# Patient Record
Sex: FEMALE | Race: WHITE | NOT HISPANIC OR LATINO | Employment: OTHER | URBAN - METROPOLITAN AREA
[De-identification: names, ages, dates, MRNs, and addresses within clinical notes are randomized per-mention and may not be internally consistent; named-entity substitution may affect disease eponyms.]

---

## 2017-01-25 ENCOUNTER — ANESTHESIA (OUTPATIENT)
Dept: INTENSIVE CARE | Facility: HOSPITAL | Age: 77
DRG: 871 | End: 2017-01-25
Payer: MEDICARE

## 2017-01-25 ENCOUNTER — HOSPITAL ENCOUNTER (INPATIENT)
Facility: HOSPITAL | Age: 77
LOS: 5 days | Discharge: HOME HEALTH | DRG: 871 | End: 2017-01-30
Attending: INTERNAL MEDICINE | Admitting: INTERNAL MEDICINE
Payer: MEDICARE

## 2017-01-25 ENCOUNTER — ANESTHESIA EVENT (OUTPATIENT)
Dept: INTENSIVE CARE | Facility: HOSPITAL | Age: 77
DRG: 871 | End: 2017-01-25
Payer: MEDICARE

## 2017-01-25 DIAGNOSIS — F02.80 ALZHEIMER'S DEMENTIA WITHOUT BEHAVIORAL DISTURBANCE, UNSPECIFIED TIMING OF DEMENTIA ONSET: ICD-10-CM

## 2017-01-25 DIAGNOSIS — E87.5 HYPERKALEMIA: ICD-10-CM

## 2017-01-25 DIAGNOSIS — J96.01 ACUTE RESPIRATORY FAILURE WITH HYPOXIA: ICD-10-CM

## 2017-01-25 DIAGNOSIS — E83.42 HYPOMAGNESEMIA: ICD-10-CM

## 2017-01-25 DIAGNOSIS — I50.1 PULMONARY EDEMA WITH CONGESTIVE HEART FAILURE: ICD-10-CM

## 2017-01-25 DIAGNOSIS — J44.9 COPD (CHRONIC OBSTRUCTIVE PULMONARY DISEASE): ICD-10-CM

## 2017-01-25 DIAGNOSIS — N17.9 AKI (ACUTE KIDNEY INJURY): ICD-10-CM

## 2017-01-25 DIAGNOSIS — R53.1 WEAKNESS: ICD-10-CM

## 2017-01-25 DIAGNOSIS — J15.4 STREPTOCOCCAL PNEUMONIA: ICD-10-CM

## 2017-01-25 DIAGNOSIS — E78.2 MIXED HYPERLIPIDEMIA: ICD-10-CM

## 2017-01-25 DIAGNOSIS — J43.2 CENTRILOBULAR EMPHYSEMA: ICD-10-CM

## 2017-01-25 DIAGNOSIS — E66.01 MORBID OBESITY DUE TO EXCESS CALORIES: ICD-10-CM

## 2017-01-25 DIAGNOSIS — G30.9 ALZHEIMER'S DEMENTIA WITHOUT BEHAVIORAL DISTURBANCE, UNSPECIFIED TIMING OF DEMENTIA ONSET: ICD-10-CM

## 2017-01-25 DIAGNOSIS — R79.89 TROPONIN I ABOVE REFERENCE RANGE: ICD-10-CM

## 2017-01-25 DIAGNOSIS — D64.9 NORMOCYTIC ANEMIA: ICD-10-CM

## 2017-01-25 DIAGNOSIS — E11.8 TYPE 2 DIABETES MELLITUS WITH COMPLICATION, WITH LONG-TERM CURRENT USE OF INSULIN: ICD-10-CM

## 2017-01-25 DIAGNOSIS — R65.20 SEVERE SEPSIS WITH ACUTE ORGAN DYSFUNCTION: ICD-10-CM

## 2017-01-25 DIAGNOSIS — F03.90 DEMENTIA: ICD-10-CM

## 2017-01-25 DIAGNOSIS — N19 UREMIA: ICD-10-CM

## 2017-01-25 DIAGNOSIS — I10 ESSENTIAL HYPERTENSION: ICD-10-CM

## 2017-01-25 DIAGNOSIS — R57.0 CARDIOGENIC SHOCK: ICD-10-CM

## 2017-01-25 DIAGNOSIS — R53.81 PHYSICAL DECONDITIONING: ICD-10-CM

## 2017-01-25 DIAGNOSIS — A41.9 SEVERE SEPSIS WITH ACUTE ORGAN DYSFUNCTION: ICD-10-CM

## 2017-01-25 DIAGNOSIS — N18.4 CKD (CHRONIC KIDNEY DISEASE) STAGE 4, GFR 15-29 ML/MIN: ICD-10-CM

## 2017-01-25 DIAGNOSIS — I44.7 LBBB (LEFT BUNDLE BRANCH BLOCK): ICD-10-CM

## 2017-01-25 DIAGNOSIS — Z79.4 TYPE 2 DIABETES MELLITUS WITH COMPLICATION, WITH LONG-TERM CURRENT USE OF INSULIN: ICD-10-CM

## 2017-01-25 DIAGNOSIS — E87.1 HYPONATREMIA: ICD-10-CM

## 2017-01-25 DIAGNOSIS — R57.9 SHOCK: ICD-10-CM

## 2017-01-25 DIAGNOSIS — G93.40 ENCEPHALOPATHY ACUTE: ICD-10-CM

## 2017-01-25 DIAGNOSIS — G93.40 ENCEPHALOPATHY: ICD-10-CM

## 2017-01-25 DIAGNOSIS — E11.8 TYPE 2 DIABETES MELLITUS WITH COMPLICATION, WITHOUT LONG-TERM CURRENT USE OF INSULIN: ICD-10-CM

## 2017-01-25 DIAGNOSIS — J96.21 ACUTE ON CHRONIC RESPIRATORY FAILURE WITH HYPOXIA AND HYPERCAPNIA: Primary | ICD-10-CM

## 2017-01-25 DIAGNOSIS — J96.22 ACUTE ON CHRONIC RESPIRATORY FAILURE WITH HYPOXIA AND HYPERCAPNIA: Primary | ICD-10-CM

## 2017-01-25 PROBLEM — I95.0 IDIOPATHIC HYPOTENSION: Status: RESOLVED | Noted: 2017-01-25 | Resolved: 2017-01-25

## 2017-01-25 PROBLEM — I95.0 IDIOPATHIC HYPOTENSION: Status: ACTIVE | Noted: 2017-01-25

## 2017-01-25 PROBLEM — B35.3 TINEA PEDIS OF BOTH FEET: Status: ACTIVE | Noted: 2017-01-25

## 2017-01-25 LAB
ALBUMIN SERPL BCP-MCNC: 2 G/DL
ALBUMIN SERPL BCP-MCNC: 2.1 G/DL
ALLENS TEST: ABNORMAL
ALP SERPL-CCNC: 119 U/L
ALT SERPL W/O P-5'-P-CCNC: 13 U/L
AMPHET+METHAMPHET UR QL: NEGATIVE
ANION GAP SERPL CALC-SCNC: 15 MMOL/L
ANION GAP SERPL CALC-SCNC: 18 MMOL/L
ANION GAP SERPL CALC-SCNC: 18 MMOL/L
ANION GAP SERPL CALC-SCNC: 19 MMOL/L
ANISOCYTOSIS BLD QL SMEAR: SLIGHT
AST SERPL-CCNC: 11 U/L
B-OH-BUTYR BLD STRIP-SCNC: 0 MMOL/L
BARBITURATES UR QL SCN>200 NG/ML: NEGATIVE
BASOPHILS # BLD AUTO: ABNORMAL K/UL
BASOPHILS NFR BLD: 0 %
BENZODIAZ UR QL SCN>200 NG/ML: NEGATIVE
BILIRUB SERPL-MCNC: 0.4 MG/DL
BILIRUB UR QL STRIP: NEGATIVE
BNP SERPL-MCNC: 274 PG/ML
BUN SERPL-MCNC: 105 MG/DL
BUN SERPL-MCNC: 105 MG/DL
BUN SERPL-MCNC: 110 MG/DL
BUN SERPL-MCNC: 116 MG/DL
BZE UR QL SCN: NEGATIVE
CALCIUM SERPL-MCNC: 7.7 MG/DL
CALCIUM SERPL-MCNC: 8.3 MG/DL
CALCIUM SERPL-MCNC: 8.5 MG/DL
CALCIUM SERPL-MCNC: 8.5 MG/DL
CANNABINOIDS UR QL SCN: NEGATIVE
CHLORIDE SERPL-SCNC: 86 MMOL/L
CHLORIDE SERPL-SCNC: 88 MMOL/L
CHLORIDE SERPL-SCNC: 89 MMOL/L
CHLORIDE SERPL-SCNC: 91 MMOL/L
CHOLEST/HDLC SERPL: 5.7 {RATIO}
CLARITY UR: CLEAR
CO2 SERPL-SCNC: 22 MMOL/L
CO2 SERPL-SCNC: 23 MMOL/L
CO2 SERPL-SCNC: 23 MMOL/L
CO2 SERPL-SCNC: 24 MMOL/L
COLOR UR: YELLOW
CREAT SERPL-MCNC: 10.2 MG/DL
CREAT SERPL-MCNC: 11.1 MG/DL
CREAT SERPL-MCNC: 11.9 MG/DL
CREAT SERPL-MCNC: 12.4 MG/DL
CREAT UR-MCNC: 69 MG/DL
CREAT UR-MCNC: 69 MG/DL
DELSYS: ABNORMAL
DIFFERENTIAL METHOD: ABNORMAL
EOSINOPHIL # BLD AUTO: ABNORMAL K/UL
EOSINOPHIL NFR BLD: 0 %
ERYTHROCYTE [DISTWIDTH] IN BLOOD BY AUTOMATED COUNT: 14.8 %
ERYTHROCYTE [SEDIMENTATION RATE] IN BLOOD BY WESTERGREN METHOD: 16 MM/H
ERYTHROCYTE [SEDIMENTATION RATE] IN BLOOD BY WESTERGREN METHOD: 20 MM/H
ERYTHROCYTE [SEDIMENTATION RATE] IN BLOOD BY WESTERGREN METHOD: 28 MM/H
ERYTHROCYTE [SEDIMENTATION RATE] IN BLOOD BY WESTERGREN METHOD: 34 MM/H
ERYTHROCYTE [SEDIMENTATION RATE] IN BLOOD BY WESTERGREN METHOD: 34 MM/H
EST. GFR  (AFRICAN AMERICAN): 3 ML/MIN/1.73 M^2
EST. GFR  (AFRICAN AMERICAN): 4 ML/MIN/1.73 M^2
EST. GFR  (NON AFRICAN AMERICAN): 3 ML/MIN/1.73 M^2
ESTIMATED AVG GLUCOSE: 169 MG/DL
ESTIMATED PA SYSTOLIC PRESSURE: 3.46
ETHANOL UR-MCNC: <10 MG/DL
FERRITIN SERPL-MCNC: 434 NG/ML
FIO2: 50
FOLATE SERPL-MCNC: 11.7 NG/ML
GLUCOSE SERPL-MCNC: 194 MG/DL
GLUCOSE SERPL-MCNC: 207 MG/DL
GLUCOSE SERPL-MCNC: 307 MG/DL
GLUCOSE SERPL-MCNC: 356 MG/DL
GLUCOSE UR QL STRIP: NEGATIVE
HBA1C MFR BLD HPLC: 7.5 %
HCO3 UR-SCNC: 24.2 MMOL/L (ref 24–28)
HCO3 UR-SCNC: 24.2 MMOL/L (ref 24–28)
HCO3 UR-SCNC: 25.5 MMOL/L (ref 24–28)
HCO3 UR-SCNC: 25.7 MMOL/L (ref 24–28)
HCO3 UR-SCNC: 26.1 MMOL/L (ref 24–28)
HCO3 UR-SCNC: 27.2 MMOL/L (ref 24–28)
HCT VFR BLD AUTO: 33.5 %
HCT VFR BLD CALC: 34 %PCV (ref 36–54)
HDL/CHOLESTEROL RATIO: 17.7 %
HDLC SERPL-MCNC: 113 MG/DL
HDLC SERPL-MCNC: 20 MG/DL
HGB BLD-MCNC: 10.3 G/DL
HGB BLD-MCNC: 12 G/DL
HGB UR QL STRIP: ABNORMAL
HYPOCHROMIA BLD QL SMEAR: ABNORMAL
IRON SERPL-MCNC: 46 UG/DL
KETONES UR QL STRIP: NEGATIVE
LACTATE SERPL-SCNC: 0.6 MMOL/L
LDLC SERPL CALC-MCNC: 68.2 MG/DL
LEUKOCYTE ESTERASE UR QL STRIP: NEGATIVE
LYMPHOCYTES # BLD AUTO: ABNORMAL K/UL
LYMPHOCYTES NFR BLD: 7 %
MAGNESIUM SERPL-MCNC: 1.6 MG/DL
MAGNESIUM SERPL-MCNC: 1.9 MG/DL
MCH RBC QN AUTO: 29.6 PG
MCHC RBC AUTO-ENTMCNC: 30.7 %
MCV RBC AUTO: 96 FL
METHADONE UR QL SCN>300 NG/ML: NEGATIVE
MICROSCOPIC COMMENT: NORMAL
MODE: ABNORMAL
MONOCYTES # BLD AUTO: ABNORMAL K/UL
MONOCYTES NFR BLD: 1 %
NEUTROPHILS NFR BLD: 89 %
NEUTS BAND NFR BLD MANUAL: 3 %
NITRITE UR QL STRIP: NEGATIVE
NONHDLC SERPL-MCNC: 93 MG/DL
OPIATES UR QL SCN: NORMAL
PCO2 BLDA: 35.4 MMHG (ref 35–45)
PCO2 BLDA: 54.1 MMHG (ref 35–45)
PCO2 BLDA: 54.4 MMHG (ref 35–45)
PCO2 BLDA: 56.1 MMHG (ref 35–45)
PCO2 BLDA: 77 MMHG (ref 35–45)
PCO2 BLDA: 85.8 MMHG (ref 35–45)
PCP UR QL SCN>25 NG/ML: NEGATIVE
PEEP: 5
PH SMN: 7.11 [PH] (ref 7.35–7.45)
PH SMN: 7.14 [PH] (ref 7.35–7.45)
PH SMN: 7.26 [PH] (ref 7.35–7.45)
PH SMN: 7.27 [PH] (ref 7.35–7.45)
PH SMN: 7.28 [PH] (ref 7.35–7.45)
PH SMN: 7.44 [PH] (ref 7.35–7.45)
PH UR STRIP: 5 [PH] (ref 5–8)
PHOSPHATE SERPL-MCNC: 7.4 MG/DL
PLATELET # BLD AUTO: 433 K/UL
PLATELET BLD QL SMEAR: ABNORMAL
PMV BLD AUTO: 9.2 FL
PO2 BLDA: 109 MMHG (ref 80–100)
PO2 BLDA: 120 MMHG (ref 80–100)
PO2 BLDA: 120 MMHG (ref 80–100)
PO2 BLDA: 42 MMHG (ref 40–60)
PO2 BLDA: 75 MMHG (ref 80–100)
PO2 BLDA: 81 MMHG (ref 80–100)
POC BE: -1 MMOL/L
POC BE: -1 MMOL/L
POC BE: -2 MMOL/L
POC BE: -3 MMOL/L
POC BE: -3 MMOL/L
POC BE: 0 MMOL/L
POC IONIZED CALCIUM: 0.99 MMOL/L (ref 1.06–1.42)
POC SATURATED O2: 80 % (ref 95–100)
POC SATURATED O2: 89 % (ref 95–100)
POC SATURATED O2: 90 % (ref 95–100)
POC SATURATED O2: 97 % (ref 95–100)
POC SATURATED O2: 98 % (ref 95–100)
POC SATURATED O2: 98 % (ref 95–100)
POC TCO2: 25 MMOL/L (ref 24–29)
POC TCO2: 26 MMOL/L (ref 23–27)
POC TCO2: 27 MMOL/L (ref 23–27)
POC TCO2: 27 MMOL/L (ref 23–27)
POC TCO2: 28 MMOL/L (ref 23–27)
POC TCO2: 30 MMOL/L (ref 23–27)
POCT GLUCOSE: 119 MG/DL (ref 70–110)
POCT GLUCOSE: 146 MG/DL (ref 70–110)
POCT GLUCOSE: 150 MG/DL (ref 70–110)
POCT GLUCOSE: 156 MG/DL (ref 70–110)
POCT GLUCOSE: 167 MG/DL (ref 70–110)
POCT GLUCOSE: 176 MG/DL (ref 70–110)
POCT GLUCOSE: 188 MG/DL (ref 70–110)
POCT GLUCOSE: 193 MG/DL (ref 70–110)
POCT GLUCOSE: 218 MG/DL (ref 70–110)
POCT GLUCOSE: 240 MG/DL (ref 70–110)
POCT GLUCOSE: 240 MG/DL (ref 70–110)
POCT GLUCOSE: 250 MG/DL (ref 70–110)
POCT GLUCOSE: 273 MG/DL (ref 70–110)
POCT GLUCOSE: 316 MG/DL (ref 70–110)
POCT GLUCOSE: 326 MG/DL (ref 70–110)
POCT GLUCOSE: 330 MG/DL (ref 70–110)
POCT GLUCOSE: 346 MG/DL (ref 70–110)
POTASSIUM BLD-SCNC: 4.9 MMOL/L (ref 3.5–5.1)
POTASSIUM SERPL-SCNC: 4.9 MMOL/L
POTASSIUM SERPL-SCNC: 5 MMOL/L
POTASSIUM SERPL-SCNC: 6 MMOL/L
POTASSIUM SERPL-SCNC: 6.6 MMOL/L
PROCALCITONIN SERPL IA-MCNC: 0.44 NG/ML
PROT SERPL-MCNC: 6.2 G/DL
PROT UR QL STRIP: NEGATIVE
RBC # BLD AUTO: 3.48 M/UL
RBC #/AREA URNS HPF: 2 /HPF (ref 0–4)
RETICS/RBC NFR AUTO: 2.6 %
RETIRED EF AND QEF - SEE NOTES: 50 (ref 55–65)
SAMPLE: ABNORMAL
SATURATED IRON: 25 %
SITE: ABNORMAL
SODIUM BLD-SCNC: 128 MMOL/L (ref 136–145)
SODIUM SERPL-SCNC: 127 MMOL/L
SODIUM SERPL-SCNC: 129 MMOL/L
SODIUM SERPL-SCNC: 130 MMOL/L
SODIUM SERPL-SCNC: 130 MMOL/L
SODIUM UR-SCNC: 54 MMOL/L
SP GR UR STRIP: 1.02 (ref 1–1.03)
TOTAL IRON BINDING CAPACITY: 185 UG/DL
TOXICOLOGY INFORMATION: NORMAL
TRANSFERRIN SERPL-MCNC: 125 MG/DL
TRIGL SERPL-MCNC: 124 MG/DL
TROPONIN I SERPL DL<=0.01 NG/ML-MCNC: 0.09 NG/ML
TROPONIN I SERPL DL<=0.01 NG/ML-MCNC: 0.1 NG/ML
TROPONIN I SERPL DL<=0.01 NG/ML-MCNC: 0.1 NG/ML
TSH SERPL DL<=0.005 MIU/L-ACNC: 1.12 UIU/ML
URN SPEC COLLECT METH UR: ABNORMAL
UROBILINOGEN UR STRIP-ACNC: NEGATIVE EU/DL
VANCOMYCIN SERPL-MCNC: <1.1 UG/ML
VIT B12 SERPL-MCNC: 887 PG/ML
VT: 350
VT: 350
VT: 410
VT: 420
VT: 420
WBC # BLD AUTO: 15.91 K/UL
WBC #/AREA URNS HPF: 0 /HPF (ref 0–5)

## 2017-01-25 PROCEDURE — 94761 N-INVAS EAR/PLS OXIMETRY MLT: CPT

## 2017-01-25 PROCEDURE — 5A1945Z RESPIRATORY VENTILATION, 24-96 CONSECUTIVE HOURS: ICD-10-PCS | Performed by: INTERNAL MEDICINE

## 2017-01-25 PROCEDURE — 36620 INSERTION CATHETER ARTERY: CPT

## 2017-01-25 PROCEDURE — 80061 LIPID PANEL: CPT

## 2017-01-25 PROCEDURE — 82803 BLOOD GASES ANY COMBINATION: CPT

## 2017-01-25 PROCEDURE — 83735 ASSAY OF MAGNESIUM: CPT | Mod: 91

## 2017-01-25 PROCEDURE — 84300 ASSAY OF URINE SODIUM: CPT

## 2017-01-25 PROCEDURE — 85007 BL SMEAR W/DIFF WBC COUNT: CPT

## 2017-01-25 PROCEDURE — 27100080 HC AIRWAY ADAPTER-END TIDAL CO2

## 2017-01-25 PROCEDURE — 84145 PROCALCITONIN (PCT): CPT

## 2017-01-25 PROCEDURE — 27100108

## 2017-01-25 PROCEDURE — 25000003 PHARM REV CODE 250: Performed by: INTERNAL MEDICINE

## 2017-01-25 PROCEDURE — 86706 HEP B SURFACE ANTIBODY: CPT

## 2017-01-25 PROCEDURE — 80069 RENAL FUNCTION PANEL: CPT

## 2017-01-25 PROCEDURE — 25000003 PHARM REV CODE 250: Performed by: STUDENT IN AN ORGANIZED HEALTH CARE EDUCATION/TRAINING PROGRAM

## 2017-01-25 PROCEDURE — C9113 INJ PANTOPRAZOLE SODIUM, VIA: HCPCS | Performed by: STUDENT IN AN ORGANIZED HEALTH CARE EDUCATION/TRAINING PROGRAM

## 2017-01-25 PROCEDURE — 82728 ASSAY OF FERRITIN: CPT

## 2017-01-25 PROCEDURE — 20000000 HC ICU ROOM

## 2017-01-25 PROCEDURE — 82607 VITAMIN B-12: CPT

## 2017-01-25 PROCEDURE — 83540 ASSAY OF IRON: CPT

## 2017-01-25 PROCEDURE — 0BH17EZ INSERTION OF ENDOTRACHEAL AIRWAY INTO TRACHEA, VIA NATURAL OR ARTIFICIAL OPENING: ICD-10-PCS | Performed by: INTERNAL MEDICINE

## 2017-01-25 PROCEDURE — 31500 INSERT EMERGENCY AIRWAY: CPT

## 2017-01-25 PROCEDURE — 94640 AIRWAY INHALATION TREATMENT: CPT

## 2017-01-25 PROCEDURE — 80053 COMPREHEN METABOLIC PANEL: CPT

## 2017-01-25 PROCEDURE — 83880 ASSAY OF NATRIURETIC PEPTIDE: CPT

## 2017-01-25 PROCEDURE — 37799 UNLISTED PX VASCULAR SURGERY: CPT

## 2017-01-25 PROCEDURE — 87040 BLOOD CULTURE FOR BACTERIA: CPT

## 2017-01-25 PROCEDURE — 84443 ASSAY THYROID STIM HORMONE: CPT

## 2017-01-25 PROCEDURE — 90945 DIALYSIS ONE EVALUATION: CPT

## 2017-01-25 PROCEDURE — 83036 HEMOGLOBIN GLYCOSYLATED A1C: CPT

## 2017-01-25 PROCEDURE — 94002 VENT MGMT INPAT INIT DAY: CPT

## 2017-01-25 PROCEDURE — 87340 HEPATITIS B SURFACE AG IA: CPT

## 2017-01-25 PROCEDURE — 87205 SMEAR GRAM STAIN: CPT

## 2017-01-25 PROCEDURE — 27200966 HC CLOSED SUCTION SYSTEM

## 2017-01-25 PROCEDURE — 85027 COMPLETE CBC AUTOMATED: CPT

## 2017-01-25 PROCEDURE — 80307 DRUG TEST PRSMV CHEM ANLYZR: CPT

## 2017-01-25 PROCEDURE — 86705 HEP B CORE ANTIBODY IGM: CPT

## 2017-01-25 PROCEDURE — 36415 COLL VENOUS BLD VENIPUNCTURE: CPT

## 2017-01-25 PROCEDURE — 80048 BASIC METABOLIC PNL TOTAL CA: CPT

## 2017-01-25 PROCEDURE — 87147 CULTURE TYPE IMMUNOLOGIC: CPT

## 2017-01-25 PROCEDURE — 63600175 PHARM REV CODE 636 W HCPCS: Performed by: HOSPITALIST

## 2017-01-25 PROCEDURE — 36600 WITHDRAWAL OF ARTERIAL BLOOD: CPT

## 2017-01-25 PROCEDURE — 84484 ASSAY OF TROPONIN QUANT: CPT | Mod: 91

## 2017-01-25 PROCEDURE — 82010 KETONE BODYS QUAN: CPT

## 2017-01-25 PROCEDURE — 80202 ASSAY OF VANCOMYCIN: CPT

## 2017-01-25 PROCEDURE — 25000242 PHARM REV CODE 250 ALT 637 W/ HCPCS: Performed by: INTERNAL MEDICINE

## 2017-01-25 PROCEDURE — 86706 HEP B SURFACE ANTIBODY: CPT | Mod: 91

## 2017-01-25 PROCEDURE — 25000242 PHARM REV CODE 250 ALT 637 W/ HCPCS: Performed by: STUDENT IN AN ORGANIZED HEALTH CARE EDUCATION/TRAINING PROGRAM

## 2017-01-25 PROCEDURE — 31500 INSERT EMERGENCY AIRWAY: CPT | Performed by: ANESTHESIOLOGY

## 2017-01-25 PROCEDURE — 27000221 HC OXYGEN, UP TO 24 HOURS

## 2017-01-25 PROCEDURE — 36556 INSERT NON-TUNNEL CV CATH: CPT

## 2017-01-25 PROCEDURE — 93005 ELECTROCARDIOGRAM TRACING: CPT

## 2017-01-25 PROCEDURE — 80048 BASIC METABOLIC PNL TOTAL CA: CPT | Mod: 91

## 2017-01-25 PROCEDURE — 85045 AUTOMATED RETICULOCYTE COUNT: CPT

## 2017-01-25 PROCEDURE — 51702 INSERT TEMP BLADDER CATH: CPT

## 2017-01-25 PROCEDURE — 87086 URINE CULTURE/COLONY COUNT: CPT

## 2017-01-25 PROCEDURE — 63600175 PHARM REV CODE 636 W HCPCS

## 2017-01-25 PROCEDURE — 25000003 PHARM REV CODE 250

## 2017-01-25 PROCEDURE — 63600175 PHARM REV CODE 636 W HCPCS: Performed by: STUDENT IN AN ORGANIZED HEALTH CARE EDUCATION/TRAINING PROGRAM

## 2017-01-25 PROCEDURE — 84484 ASSAY OF TROPONIN QUANT: CPT

## 2017-01-25 PROCEDURE — 83735 ASSAY OF MAGNESIUM: CPT

## 2017-01-25 PROCEDURE — 25000003 PHARM REV CODE 250: Performed by: SURGERY

## 2017-01-25 PROCEDURE — 82746 ASSAY OF FOLIC ACID SERUM: CPT

## 2017-01-25 PROCEDURE — 83605 ASSAY OF LACTIC ACID: CPT

## 2017-01-25 PROCEDURE — 87070 CULTURE OTHR SPECIMN AEROBIC: CPT

## 2017-01-25 PROCEDURE — 93306 TTE W/DOPPLER COMPLETE: CPT

## 2017-01-25 PROCEDURE — 63600175 PHARM REV CODE 636 W HCPCS: Performed by: INTERNAL MEDICINE

## 2017-01-25 PROCEDURE — 81000 URINALYSIS NONAUTO W/SCOPE: CPT

## 2017-01-25 PROCEDURE — 02H633Z INSERTION OF INFUSION DEVICE INTO RIGHT ATRIUM, PERCUTANEOUS APPROACH: ICD-10-PCS | Performed by: INTERNAL MEDICINE

## 2017-01-25 RX ORDER — PENTOXIFYLLINE 400 MG/1
400 TABLET, EXTENDED RELEASE ORAL 2 TIMES DAILY WITH MEALS
Status: DISCONTINUED | OUTPATIENT
Start: 2017-01-25 | End: 2017-01-30 | Stop reason: HOSPADM

## 2017-01-25 RX ORDER — MAGNESIUM SULFATE HEPTAHYDRATE 40 MG/ML
2 INJECTION, SOLUTION INTRAVENOUS ONCE
Status: COMPLETED | OUTPATIENT
Start: 2017-01-25 | End: 2017-01-25

## 2017-01-25 RX ORDER — MIDAZOLAM HYDROCHLORIDE 1 MG/ML
2 INJECTION INTRAMUSCULAR; INTRAVENOUS ONCE
Status: COMPLETED | OUTPATIENT
Start: 2017-01-25 | End: 2017-01-25

## 2017-01-25 RX ORDER — PROPOFOL 10 MG/ML
5 INJECTION, EMULSION INTRAVENOUS CONTINUOUS
Status: DISCONTINUED | OUTPATIENT
Start: 2017-01-25 | End: 2017-01-26

## 2017-01-25 RX ORDER — SODIUM POLYSTYRENE SULFONATE 15 G/60ML
15 SUSPENSION ORAL; RECTAL ONCE
Status: COMPLETED | OUTPATIENT
Start: 2017-01-25 | End: 2017-01-25

## 2017-01-25 RX ORDER — ALBUTEROL SULFATE 2.5 MG/.5ML
15 SOLUTION RESPIRATORY (INHALATION) ONCE
Status: COMPLETED | OUTPATIENT
Start: 2017-01-25 | End: 2017-01-25

## 2017-01-25 RX ORDER — IPRATROPIUM BROMIDE AND ALBUTEROL SULFATE 2.5; .5 MG/3ML; MG/3ML
3 SOLUTION RESPIRATORY (INHALATION) EVERY 4 HOURS
Status: DISCONTINUED | OUTPATIENT
Start: 2017-01-25 | End: 2017-01-30 | Stop reason: HOSPADM

## 2017-01-25 RX ORDER — FLUTICASONE FUROATE AND VILANTEROL 100; 25 UG/1; UG/1
1 POWDER RESPIRATORY (INHALATION) DAILY
Status: DISCONTINUED | OUTPATIENT
Start: 2017-01-25 | End: 2017-01-30 | Stop reason: HOSPADM

## 2017-01-25 RX ORDER — CHLORHEXIDINE GLUCONATE ORAL RINSE 1.2 MG/ML
15 SOLUTION DENTAL 2 TIMES DAILY
Status: DISCONTINUED | OUTPATIENT
Start: 2017-01-25 | End: 2017-01-30 | Stop reason: HOSPADM

## 2017-01-25 RX ORDER — ONDANSETRON 2 MG/ML
4 INJECTION INTRAMUSCULAR; INTRAVENOUS ONCE
Status: COMPLETED | OUTPATIENT
Start: 2017-01-25 | End: 2017-01-25

## 2017-01-25 RX ORDER — ONDANSETRON 2 MG/ML
INJECTION INTRAMUSCULAR; INTRAVENOUS
Status: COMPLETED
Start: 2017-01-25 | End: 2017-01-25

## 2017-01-25 RX ORDER — PROPOFOL 10 MG/ML
INJECTION, EMULSION INTRAVENOUS
Status: COMPLETED
Start: 2017-01-25 | End: 2017-01-25

## 2017-01-25 RX ORDER — DEXMEDETOMIDINE HYDROCHLORIDE 4 UG/ML
0.2 INJECTION, SOLUTION INTRAVENOUS CONTINUOUS
Status: DISCONTINUED | OUTPATIENT
Start: 2017-01-25 | End: 2017-01-26

## 2017-01-25 RX ORDER — DONEPEZIL HYDROCHLORIDE 5 MG/1
5 TABLET, FILM COATED ORAL NIGHTLY
Status: DISCONTINUED | OUTPATIENT
Start: 2017-01-25 | End: 2017-01-30 | Stop reason: HOSPADM

## 2017-01-25 RX ORDER — HEPARIN SODIUM 5000 [USP'U]/ML
5000 INJECTION, SOLUTION INTRAVENOUS; SUBCUTANEOUS EVERY 12 HOURS
Status: DISCONTINUED | OUTPATIENT
Start: 2017-01-25 | End: 2017-01-30 | Stop reason: HOSPADM

## 2017-01-25 RX ORDER — ATORVASTATIN CALCIUM 40 MG/1
80 TABLET, FILM COATED ORAL DAILY
Status: DISCONTINUED | OUTPATIENT
Start: 2017-01-25 | End: 2017-01-30 | Stop reason: HOSPADM

## 2017-01-25 RX ORDER — MAGNESIUM SULFATE HEPTAHYDRATE 40 MG/ML
2 INJECTION, SOLUTION INTRAVENOUS
Status: ACTIVE | OUTPATIENT
Start: 2017-01-25 | End: 2017-01-26

## 2017-01-25 RX ORDER — PANTOPRAZOLE SODIUM 40 MG/1
40 TABLET, DELAYED RELEASE ORAL DAILY
Status: DISCONTINUED | OUTPATIENT
Start: 2017-01-25 | End: 2017-01-25

## 2017-01-25 RX ORDER — SIMVASTATIN 40 MG/1
80 TABLET, FILM COATED ORAL NIGHTLY
Status: DISCONTINUED | OUTPATIENT
Start: 2017-01-25 | End: 2017-01-25

## 2017-01-25 RX ORDER — ONDANSETRON 2 MG/ML
4 INJECTION INTRAMUSCULAR; INTRAVENOUS ONCE
Status: DISCONTINUED | OUTPATIENT
Start: 2017-01-25 | End: 2017-01-25

## 2017-01-25 RX ORDER — METHYLPREDNISOLONE SOD SUCC 125 MG
125 VIAL (EA) INJECTION EVERY 8 HOURS
Status: DISCONTINUED | OUTPATIENT
Start: 2017-01-25 | End: 2017-01-25

## 2017-01-25 RX ORDER — CIPROFLOXACIN 2 MG/ML
400 INJECTION, SOLUTION INTRAVENOUS
Status: DISCONTINUED | OUTPATIENT
Start: 2017-01-25 | End: 2017-01-27

## 2017-01-25 RX ORDER — HEPARIN SODIUM 5000 [USP'U]/ML
5000 INJECTION, SOLUTION INTRAVENOUS; SUBCUTANEOUS EVERY 8 HOURS
Status: DISCONTINUED | OUTPATIENT
Start: 2017-01-25 | End: 2017-01-25

## 2017-01-25 RX ORDER — PANTOPRAZOLE SODIUM 40 MG/10ML
40 INJECTION, POWDER, LYOPHILIZED, FOR SOLUTION INTRAVENOUS DAILY
Status: DISCONTINUED | OUTPATIENT
Start: 2017-01-25 | End: 2017-01-27

## 2017-01-25 RX ORDER — HEPARIN SODIUM 1000 [USP'U]/ML
3000 INJECTION, SOLUTION INTRAVENOUS; SUBCUTANEOUS
Status: DISCONTINUED | OUTPATIENT
Start: 2017-01-25 | End: 2017-01-30 | Stop reason: HOSPADM

## 2017-01-25 RX ADMIN — VANCOMYCIN HYDROCHLORIDE 1500 MG: 1 INJECTION, POWDER, LYOPHILIZED, FOR SOLUTION INTRAVENOUS at 06:01

## 2017-01-25 RX ADMIN — HEPARIN SODIUM 3000 UNITS: 1000 INJECTION, SOLUTION INTRAVENOUS; SUBCUTANEOUS at 07:01

## 2017-01-25 RX ADMIN — PROPOFOL 5 MCG/KG/MIN: 10 INJECTION, EMULSION INTRAVENOUS at 11:01

## 2017-01-25 RX ADMIN — IPRATROPIUM BROMIDE AND ALBUTEROL SULFATE 3 ML: .5; 3 SOLUTION RESPIRATORY (INHALATION) at 05:01

## 2017-01-25 RX ADMIN — INSULIN HUMAN 10 UNITS: 100 INJECTION, SOLUTION PARENTERAL at 02:01

## 2017-01-25 RX ADMIN — PROPOFOL 25 MCG/KG/MIN: 10 INJECTION, EMULSION INTRAVENOUS at 03:01

## 2017-01-25 RX ADMIN — DEXMEDETOMIDINE HYDROCHLORIDE 0.9 MCG/KG/HR: 4 INJECTION, SOLUTION INTRAVENOUS at 06:01

## 2017-01-25 RX ADMIN — DEXMEDETOMIDINE HYDROCHLORIDE 1.5 MCG/KG/HR: 4 INJECTION, SOLUTION INTRAVENOUS at 10:01

## 2017-01-25 RX ADMIN — INSULIN HUMAN 10 UNITS: 100 INJECTION, SOLUTION PARENTERAL at 05:01

## 2017-01-25 RX ADMIN — ALBUTEROL SULFATE 15 MG: 2.5 SOLUTION RESPIRATORY (INHALATION) at 08:01

## 2017-01-25 RX ADMIN — SODIUM POLYSTYRENE SULFONATE 15 G: 15 SUSPENSION ORAL; RECTAL at 05:01

## 2017-01-25 RX ADMIN — PROPOFOL 35 MCG/KG/MIN: 10 INJECTION, EMULSION INTRAVENOUS at 11:01

## 2017-01-25 RX ADMIN — PANTOPRAZOLE SODIUM 40 MG: 40 INJECTION, POWDER, FOR SOLUTION INTRAVENOUS at 08:01

## 2017-01-25 RX ADMIN — DONEPEZIL HYDROCHLORIDE 5 MG: 5 TABLET ORAL at 10:01

## 2017-01-25 RX ADMIN — IPRATROPIUM BROMIDE AND ALBUTEROL SULFATE 3 ML: .5; 3 SOLUTION RESPIRATORY (INHALATION) at 11:01

## 2017-01-25 RX ADMIN — METHYLPREDNISOLONE SODIUM SUCCINATE 40 MG: 40 INJECTION, POWDER, FOR SOLUTION INTRAMUSCULAR; INTRAVENOUS at 10:01

## 2017-01-25 RX ADMIN — HEPARIN SODIUM 5000 UNITS: 5000 INJECTION, SOLUTION INTRAVENOUS; SUBCUTANEOUS at 05:01

## 2017-01-25 RX ADMIN — SODIUM CHLORIDE 2 UNITS/HR: 9 INJECTION, SOLUTION INTRAVENOUS at 06:01

## 2017-01-25 RX ADMIN — CIPROFLOXACIN 400 MG: 2 INJECTION, SOLUTION INTRAVENOUS at 05:01

## 2017-01-25 RX ADMIN — SODIUM CHLORIDE 4.6 UNITS/HR: 9 INJECTION, SOLUTION INTRAVENOUS at 12:01

## 2017-01-25 RX ADMIN — NOREPINEPHRINE BITARTRATE 0.02 MCG/KG/MIN: 1 INJECTION, SOLUTION, CONCENTRATE INTRAVENOUS at 05:01

## 2017-01-25 RX ADMIN — DEXTROSE MONOHYDRATE 25 G: 25 INJECTION, SOLUTION INTRAVENOUS at 06:01

## 2017-01-25 RX ADMIN — DEXMEDETOMIDINE HYDROCHLORIDE 0.2 MCG/KG/HR: 4 INJECTION, SOLUTION INTRAVENOUS at 05:01

## 2017-01-25 RX ADMIN — DEXTROSE MONOHYDRATE 25 G: 500 INJECTION PARENTERAL at 03:01

## 2017-01-25 RX ADMIN — CHLORHEXIDINE GLUCONATE 15 ML: 1.2 RINSE ORAL at 10:01

## 2017-01-25 RX ADMIN — PIPERACILLIN SODIUM AND TAZOBACTAM SODIUM 4.5 G: 4; .5 INJECTION, POWDER, FOR SOLUTION INTRAVENOUS at 05:01

## 2017-01-25 RX ADMIN — Medication 1000 MG: at 05:01

## 2017-01-25 RX ADMIN — PROPOFOL 25 MCG/KG/MIN: 10 INJECTION, EMULSION INTRAVENOUS at 07:01

## 2017-01-25 RX ADMIN — IPRATROPIUM BROMIDE AND ALBUTEROL SULFATE 3 ML: .5; 3 SOLUTION RESPIRATORY (INHALATION) at 04:01

## 2017-01-25 RX ADMIN — PROPOFOL 5 MCG/KG/MIN: 10 INJECTION, EMULSION INTRAVENOUS at 10:01

## 2017-01-25 RX ADMIN — DEXMEDETOMIDINE HYDROCHLORIDE 1.4 MCG/KG/HR: 4 INJECTION, SOLUTION INTRAVENOUS at 08:01

## 2017-01-25 RX ADMIN — IPRATROPIUM BROMIDE AND ALBUTEROL SULFATE 3 ML: .5; 3 SOLUTION RESPIRATORY (INHALATION) at 07:01

## 2017-01-25 RX ADMIN — HEPARIN SODIUM 5000 UNITS: 5000 INJECTION, SOLUTION INTRAVENOUS; SUBCUTANEOUS at 10:01

## 2017-01-25 RX ADMIN — MAGNESIUM SULFATE IN WATER 2 G: 40 INJECTION, SOLUTION INTRAVENOUS at 01:01

## 2017-01-25 RX ADMIN — METHYLPREDNISOLONE SODIUM SUCCINATE 125 MG: 125 INJECTION, POWDER, FOR SOLUTION INTRAMUSCULAR; INTRAVENOUS at 05:01

## 2017-01-25 RX ADMIN — DEXTROSE MONOHYDRATE 25 G: 500 INJECTION PARENTERAL at 02:01

## 2017-01-25 RX ADMIN — SODIUM CHLORIDE 1000 ML: 0.9 INJECTION, SOLUTION INTRAVENOUS at 01:01

## 2017-01-25 RX ADMIN — ATORVASTATIN CALCIUM 80 MG: 40 TABLET, FILM COATED ORAL at 10:01

## 2017-01-25 RX ADMIN — MIDAZOLAM 2 MG: 1 INJECTION INTRAMUSCULAR; INTRAVENOUS at 03:01

## 2017-01-25 RX ADMIN — IPRATROPIUM BROMIDE AND ALBUTEROL SULFATE 3 ML: .5; 3 SOLUTION RESPIRATORY (INHALATION) at 08:01

## 2017-01-25 NOTE — PLAN OF CARE
Discuss with Dialysis team that dialysis is emergent with uremia, concerning ABG, and K.  Dialysis team notes that it takes 30 min to prime the line.     Jan Duque MD  [facility]

## 2017-01-25 NOTE — PLAN OF CARE
Problem: Renal Replacement, Continuous (Adult)  Goal: Signs and Symptoms of Listed Potential Problems Will be Absent, Minimized or Managed (Renal Replacement, Continuous)  Signs and symptoms of listed potential problems will be absent, minimized or managed by discharge/transition of care (reference Renal Replacement, Continuous (Adult) CPG).  Outcome: Ongoing (interventions implemented as appropriate)    01/25/17 1630   Renal Replacement, Continuous   Problems Assessed (Continuous Renal Replacement Therapy) all   Problems Present (Continuous Renal Replacement Therapy) acid-base imbalance;electrolyte imbalance;fluid imbalance;hypotension;infection   NxStage CVVH in progress as ordered.

## 2017-01-25 NOTE — CONSULTS
Consult   [facility]       SUBJECTIVE:     Chief Complaint/Reason for Admission: LBBB , hypotension     History of Present Illness:  Ms. Adela Jessica is a 76 y.o. with CKD , DMII , HLD , HTN , depression , dementia , gerd ( ALL PER CHART 2/2 patients condition ) here with cough, increased work of breathing, then and AMS on day of admission which prompted evaluation . Per chart there hd been many calls to EMS for falls and AMS           PTA Medications   Medication Sig    albuterol (PROVENTIL/VENTOLIN) 90 mcg/actuation inhaler Inhale 2 puffs into the lungs every 6 (six) hours as needed for Wheezing.    aspirin 325 MG tablet Take 325 mg by mouth once daily.    donepezil (ARICEPT) 5 MG tablet Take 5 mg by mouth every evening.    fish oil-omega-3 fatty acids 300-1,000 mg capsule Take 2 g by mouth once daily.    fluticasone-salmeterol 100-50 mcg/dose (ADVAIR) 100-50 mcg/dose diskus inhaler Inhale 1 puff into the lungs 2 (two) times daily.    lisinopril (PRINIVIL,ZESTRIL) 5 MG tablet Take 5 mg by mouth once daily.    metformin (GLUCOPHAGE) 500 MG tablet Take 1 tablet (500 mg total) by mouth 2 (two) times daily with meals.    multivitamin (THERAGRAN) per tablet Take 1 tablet by mouth once daily.    omega-3 acid ethyl esters (LOVAZA) 1 gram capsule Take 2 g by mouth 2 (two) times daily.    omeprazole (PRILOSEC) 40 MG capsule Take 40 mg by mouth once daily.    pentoxifylline (TRENTAL) 400 mg TbSR Take 400 mg by mouth 2 (two) times daily with meals.    pregabalin (LYRICA) 50 MG capsule Take 50 mg by mouth 3 (three) times daily.    simvastatin (ZOCOR) 20 MG tablet Take 80 mg by mouth every evening.     triazolam (HALCION) 0.25 MG Tab Take 0.125 mg by mouth nightly as needed.    [DISCONTINUED] albuterol sulfate 2.5 mg/0.5 mL Nebu Take 2.5 mg by nebulization every 4 (four) hours as needed.    [DISCONTINUED] duloxetine (CYMBALTA) 60 MG capsule Take 60 mg by mouth once daily.    [DISCONTINUED] ferrous sulfate  325 mg (65 mg iron) Tab tablet Take 325 mg by mouth daily with breakfast.    [DISCONTINUED] glipiZIDE (GLUCOTROL) 5 MG tablet Take 5 mg by mouth daily with breakfast.    [DISCONTINUED] insulin detemir (LEVEMIR) 100 unit/mL injection Inject 18 Units into the skin every evening.        Review of patient's allergies indicates:  No Known Allergies    Past Medical History   Diagnosis Date    Arthritis     CKD (chronic kidney disease)     Dementia     Depression     DM2 (diabetes mellitus, type 2)     GERD (gastroesophageal reflux disease)     HLD (hyperlipidemia)     HTN (hypertension)     HIWOT on CPAP        Past Surgical History   Procedure Laterality Date    Hysterectomy         No family history on file.     Social History     Social History    Marital status: Single     Spouse name: N/A    Number of children: N/A    Years of education: N/A     Occupational History          retired     Social History Main Topics    Smoking status: Never Smoker    Smokeless tobacco: Not on file    Alcohol use No    Drug use: No    Sexual activity: Not on file     Other Topics Concern    Not on file     Social History Narrative       Review of Systems:  Unable to obtain       OBJECTIVE:     Temp:  [96 °F (35.6 °C)-97.2 °F (36.2 °C)] 96 °F (35.6 °C)  Pulse:  [61-86] 73  Resp:  [] 35  SpO2:  [81 %-100 %] 96 %  BP: ()/(33-76) 126/60  Body mass index is 60.92 kg/(m^2).     Physical Exam:  General appearance: intubated, sedated   HEENT:  Normocephalic, atruamatic, conjunctivae normal, sclarea anictric, PERRL, Mucus membranes moist,  Trachea midline , ET tube in place, R neck HD catheter   Lungs: expiratory wheezes, mechanical sounds    Heart: distant , RRR   Abdomen: Soft, non-tender, non-distended; bowel sounds normal; no masses, no organomegaly  Extremities: anasarca  Pulses: 2+ and symmetric        Laboratory: Reviewed and noted  where applicable- Please see chart for full lab data.  On admit abg  7.//   WBC 15, Hbg 10.3   K 6.6 Na 129  Creat 12.4 ( 1.46 prior)       Diagnostic Tests:  EK17  Sinus, intraventricular condution delay , Left axis LAFB  Poor R wave progresion     CXR: Pulmonary edema     CT    ASSESSMENT/PLAN:     Assessment Ms. Adela Jessica is a 76 y.o. with CKD , DMII , HLD , HTN , depression , dementia , gerd ( ALL PER CHART 2/2 patients condition ) here with cough, increased work of breathing, then and AMS on day of admission which prompted evaluation .    Plan       1. Hypotension , LBBB   No evidence of LBBB on EKG . Further this was removed from 2013 STEMI guidelines.     Reviewed echo , preserved systolic fucntion, echo inconclusive and limited for diastolic dysfunction, there was mitral E/A reversal indicating diastolic impairment but cannot comment on diastolic failure or left sided filling pressures with technically limited study.     Overall , seems like acute renal failure is precipitant of uremia ( AMS ) , and subsequent shortness of breath , cough from volume overload . ? Underlying infection   Lactic acid suprisingly normal after reported hypotension episode. Would wean pressors as tolerated, recheck lactic acid   Continue supportive care         Benjamin Calderon D.O  [facility] Fellow

## 2017-01-25 NOTE — NURSING TRANSFER
Nursing Transfer Note      1/25/2017     Transfer from [place] to [place] [id number]    Transfer via stretcher    Transfer with  O2, cardiac monitoring    Transported by EMS    Medicines sent: family brought    Chart send with patient: Yes    Notified: MD admit team    Patient reassessed at: 1/25/17 @ 12:24      Pt arrived with family members at bedside, pt eyes open, pt not verbally responding to questions. Pt extremities and head/neck twitching.     Pt V/S assessed:  T 97.7  RR 17, wheezes in all lungs  O2 sat 91% on 2L    After a few minutes, Pt closed her eyes and became unresponsive to strong sternal rub.     HR: 84  RR: 18   Automatic B/P Rt forearm 68/46, manual 62/44, Systolic reassessed by doppler to be 70.   MD admit team at bedside, ordered 1L bolus.         Telemetry not reading after multiple attempts.          Upon arrival to floor: call bell in reach and bed in lowest position    Nurse will continue to monitor closely.

## 2017-01-25 NOTE — CONSULTS
[facility] Pulm Consult - Resident Note    Primary Attending Physician: MD Brisa  Primary Team: [facility] MEdicine  Consultant Attending: MD Tara  Consultant Resident: MD Neto    Reason for Consult:     Hypercapnic Respiratory Failure    Subjective:      History of Present Illness:  Adela Jessica is a 76 y.o.  female who  has a past medical history of Arthritis; CKD (chronic kidney disease); Dementia; Depression; DM2 (diabetes mellitus, type 2); GERD (gastroesophageal reflux disease); HLD (hyperlipidemia); HTN (hypertension); and HIWOT on CPAP.. The patient presented to the [facility] on 1/25/2017 with a primary complaint of No chief complaint on file.    History unable to be obtained from patient. History from primary team. The patient was in her usual state until one month ago when she developed a nonproductive cough. One week ago she became more somnolent and sleeping than normal. Before admission she complained of SOB and doing funny things such as touching face.    Past Medical History:  Past Medical History   Diagnosis Date    Arthritis     CKD (chronic kidney disease)     Dementia     Depression     DM2 (diabetes mellitus, type 2)     GERD (gastroesophageal reflux disease)     HLD (hyperlipidemia)     HTN (hypertension)     HIWOT on CPAP        Past Surgical History:  Past Surgical History   Procedure Laterality Date    Hysterectomy         Allergies:  Review of patient's allergies indicates:  No Known Allergies    Medications:   In-Hospital Scheduled Medications:   albuterol-ipratropium 2.5mg-0.5mg/3mL  3 mL Nebulization Q4H    ciprofloxacin  400 mg Intravenous Q24H    donepezil  5 mg Oral QHS    fluticasone-vilanterol  1 puff Inhalation Daily    heparin (porcine)  5,000 Units Subcutaneous Q8H    methylPREDNISolone sodium succinate  125 mg Intravenous Q8H    pantoprazole  40 mg Intravenous Daily    pentoxifylline  400 mg Oral BID WM     piperacillin-tazobactam 4.5 g in dextrose 5 % 100 mL IVPB (ready to mix system)  4.5 g Intravenous Q12H    piperacillin-tazobactam 4.5 g in dextrose 5 % 100 mL IVPB (ready to mix system)  4.5 g Intravenous Once    simvastatin  80 mg Oral QHS    vancomycin (VANCOCIN) IVPB  1,500 mg Intravenous Once      In-Hospital PRN Medications:  dextrose 50%, dextrose 50%, dextrose 50%, heparin (porcine)   In-Hospital IV Infusion Medications:   dexmedetomidine (PRECEDEX) infusion 0.9 mcg/kg/hr (01/25/17 0642)    insulin (HUMAN R) infusion (adults) 2 Units/hr (01/25/17 0629)    norepinephrine bitartrate-D5W 0.02 mcg/kg/min (01/25/17 0507)      Home Medications:  Prior to Admission medications    Medication Sig Start Date End Date Taking? Authorizing Provider   albuterol (PROVENTIL/VENTOLIN) 90 mcg/actuation inhaler Inhale 2 puffs into the lungs every 6 (six) hours as needed for Wheezing. 11/13/13 3/6/16  Quang Hernández MD   aspirin 325 MG tablet Take 325 mg by mouth once daily.    Historical Provider, MD   donepezil (ARICEPT) 5 MG tablet Take 5 mg by mouth every evening.    Historical Provider, MD   fish oil-omega-3 fatty acids 300-1,000 mg capsule Take 2 g by mouth once daily.    Historical Provider, MD   fluticasone-salmeterol 100-50 mcg/dose (ADVAIR) 100-50 mcg/dose diskus inhaler Inhale 1 puff into the lungs 2 (two) times daily.    Historical Provider, MD   lisinopril (PRINIVIL,ZESTRIL) 5 MG tablet Take 5 mg by mouth once daily.    Historical Provider, MD   metformin (GLUCOPHAGE) 500 MG tablet Take 1 tablet (500 mg total) by mouth 2 (two) times daily with meals. 11/13/13   Quang Hernández MD   multivitamin (THERAGRAN) per tablet Take 1 tablet by mouth once daily.    Historical Provider, MD   omega-3 acid ethyl esters (LOVAZA) 1 gram capsule Take 2 g by mouth 2 (two) times daily.    Historical Provider, MD   omeprazole (PRILOSEC) 40 MG capsule Take 40 mg by mouth once daily.    Historical Provider, MD   pentoxifylline  "(TRENTAL) 400 mg TbSR Take 400 mg by mouth 2 (two) times daily with meals.    Historical Provider, MD   pregabalin (LYRICA) 50 MG capsule Take 50 mg by mouth 3 (three) times daily.    Historical Provider, MD   simvastatin (ZOCOR) 20 MG tablet Take 80 mg by mouth every evening.     Historical Provider, MD   triazolam (HALCION) 0.25 MG Tab Take 0.125 mg by mouth nightly as needed.    Historical Provider, MD   albuterol sulfate 2.5 mg/0.5 mL Nebu Take 2.5 mg by nebulization every 4 (four) hours as needed. 3/6/16 1/25/17  Josh Gunn MD   duloxetine (CYMBALTA) 60 MG capsule Take 60 mg by mouth once daily.  17  Historical Provider, MD   ferrous sulfate 325 mg (65 mg iron) Tab tablet Take 325 mg by mouth daily with breakfast.  17  Historical Provider, MD   glipiZIDE (GLUCOTROL) 5 MG tablet Take 5 mg by mouth daily with breakfast.  17  Historical Provider, MD   insulin detemir (LEVEMIR) 100 unit/mL injection Inject 18 Units into the skin every evening.  17  Historical Provider, MD       Family History:  No family history on file.    Social History:  Social History   Substance Use Topics    Smoking status: Never Smoker    Smokeless tobacco: Not on file    Alcohol use No       Review of Systems:  Unable to obtain a ROS. Due to intubation         Objective:   Last 24 Hour Vital Signs:  BP  Min: 90/51  Max: 90/51  Pulse  Av.5  Min: 67  Max: 85  Resp  Av.8  Min: 25  Max: 58  SpO2  Av.5 %  Min: 95 %  Max: 100 %  Height  Av' 3" (160 cm)  Min: 5' 3" (160 cm)  Max: 5' 3" (160 cm)  Weight  Av kg (343 lb 14.7 oz)  Min: 156 kg (343 lb 14.7 oz)  Max: 156 kg (343 lb 14.7 oz)     Body mass index is 60.92 kg/(m^2).    Physical Examination:    Visit Vitals    /60    Pulse 65    Temp 96 °F (35.6 °C) (Axillary)    Resp (!) 35    Ht 5' 3" (1.6 m)    Wt (!) 156 kg (343 lb 14.7 oz)    SpO2 98%    Breastfeeding No    BMI 60.92 kg/m2       General Appearance:    Intubated, " unresponsive   Head:    Normocephalic, without obvious abnormality, atraumatic   Eyes:    Pupils were minumally responsive       Nose:   Nares normal, septum midline, mucosa normal, no drainage    or sinus tenderness   Throat:   LIntubated   Neck:   Supple, symmetrical, trachea midline, no adenopathy;     thyroid:  no enlargement/tenderness/nodules; no carotid    bruit or JVD       Lungs:     Expiratory wheezes noted bilaterally   Chest Wall:    No tenderness or deformity    Heart:    Regular rate and rhythm, S1 and S2 normal, no murmurs       Abdomen:     Soft, , Obese, non-tender, bowel sounds active all four quadrants,     no masses, no organomegaly           Extremities:   Fatty legs with white skin chaffing   Pulses:   2+ and symmetric all extremities           Neurologic:   Not withdrawing to pain, pupils minimally responsive, not moving spontaneously          Laboratory Results:  Most Recent Data:  CBC: Lab Results   Component Value Date    WBC 15.91 (H) 01/25/2017    HGB 10.3 (L) 01/25/2017    HCT 33.5 (L) 01/25/2017     (H) 01/25/2017    MCV 96 01/25/2017    RDW 14.8 (H) 01/25/2017     WBC Differential: 89% N, 0% Bands, 7% L, 1% M, 0% Eo, 0% Bas, with no additional cells seen  BMP: Lab Results   Component Value Date     (L) 01/25/2017    K 6.6 (HH) 01/25/2017    CL 88 (L) 01/25/2017    CO2 23 01/25/2017     (H) 01/25/2017    CREATININE 12.4 (H) 01/25/2017     (H) 01/25/2017    CALCIUM 7.7 (L) 01/25/2017    MG 1.9 11/13/2013    PHOS 2.5 (L) 11/13/2013     LFTs: Lab Results   Component Value Date    PROT 6.2 01/25/2017    ALBUMIN 2.1 (L) 01/25/2017    BILITOT 0.4 01/25/2017    AST 11 01/25/2017    ALKPHOS 119 01/25/2017    ALT 13 01/25/2017     Coags:   Lab Results   Component Value Date    INR 1.1 03/06/2016     FLP: Lab Results   Component Value Date    CHOL 113 (L) 01/25/2017    HDL 20 (L) 01/25/2017    LDLCALC 68.2 01/25/2017    TRIG 124 01/25/2017    CHOLHDL 17.7 (L) 01/25/2017      DM: Lab Results   Component Value Date    HGBA1C 6.0 11/11/2013    LDLCALC 68.2 01/25/2017    CREATININE 12.4 (H) 01/25/2017     Thyroid: Lab Results   Component Value Date    TSH 1.120 01/25/2017     Anemia: Lab Results   Component Value Date    FERRITIN 434 (H) 01/25/2017     Cardiac: Lab Results   Component Value Date    TROPONINI 0.098 (H) 01/25/2017     03/06/2016     Urinalysis: Lab Results   Component Value Date    LABURIN No growth 11/11/2013    COLORU Yellow 12/30/2016    SPECGRAV 1.015 12/30/2016    NITRITE Negative 12/30/2016    KETONESU Negative 12/30/2016    UROBILINOGEN Negative 12/30/2016       Trended Lab Data:    Recent Labs  Lab 01/25/17  0400   WBC 15.91*   HGB 10.3*   HCT 33.5*   *   MCV 96   RDW 14.8*   *   K 6.6*   CL 88*   CO2 23   *   CREATININE 12.4*   *   PROT 6.2   ALBUMIN 2.1*   BILITOT 0.4   AST 11   ALKPHOS 119   ALT 13       Trended Cardiac Data:    Recent Labs  Lab 01/25/17  0400   TROPONINI 0.098*       Microbiology Data:  Blood and respiratory Cxs pending    Other Laboratory Data:    Radiology:  X-ray Chest 1 View    Result Date: 1/25/2017  Comparison:3/6/2016 Technique: Single AP portable chest radiograph. Findings: There is an endotracheal tube in place with tip terminating at the level of the clavicles. Cardiac monitoring leads overlie the chest. There is a defibrillator pad present which obscures portions of the left hemithorax.  The cardiomediastinal silhouette appears enlarged with prominence of the central pulmonary vasculature suggesting underlying edema. There is no evidence of pneumothorax.    As above. Electronically signed by: MARIJA CASTILLO Date:     01/25/17 Time:    05:37       Antibiotics and Day Number of Therapy:  Vanc, Zosyn Cipro    Lines and Day Number of Therapy:  RIJ Tricath     Assessment:     Adela Jessica is a 76 y.o. female with:  Patient Active Problem List    Diagnosis Date Noted    SUBHASH (acute kidney injury)  01/25/2017    Morbid obesity due to excess calories 01/25/2017    Encephalopathy acute 01/25/2017    Uremia 01/25/2017    Tinea pedis of both feet 01/25/2017    Normocytic anemia 01/25/2017    Severe sepsis with acute organ dysfunction 01/25/2017    Dementia 01/25/2017    Hyponatremia 01/25/2017    Hyperkalemia 11/11/2013    CKD (chronic kidney disease) stage 4, GFR 15-29 ml/min 11/11/2013    Leukocytosis 11/11/2013    Type 2 diabetes mellitus with complication 11/11/2013    COPD (chronic obstructive pulmonary disease) 11/11/2013    HTN (hypertension) 11/11/2013    HLD (hyperlipidemia) 11/11/2013    Troponin I above reference range 11/11/2013    Acute on chronic respiratory failure with hypoxia and hypercapnia 11/10/2013        Recommendations:     Neuro  Encephalopathy likely 2/2 metabolic causes such as hypoxia and hypercapneia could also be septic in nature vs uremia  -CT head ordered but not done due to patient's unstable mental status  -patient on precedex initially switched to propofol for sedation for possible seizure     CV  -Undifferentiated Shock - Possibly Cardiogenic or Septic  -patient was hypotensive on arrival with pressures only dopplerable  -given on L bolus NS in admit  -Started on Levo for BP currently on low dose  -troponin was intermediate at OSH; and trended down  -ekg new LBBB should monitor; possibly Cards consult  -echo ordered   -bedside Echo showed likely reduced EF poor LV squeeze and function  -h/o osh with cardiomegaly  -evidence of pulmonary congestion on exam; A and B lines on pulm U/S  -Dilated IVC on bedside Echo  -Woodson for frquent ABGs and ease of BP monitoring       Resp  -patient was non-responsive with abg of ~ 7.1/85 initially; mixed picture  - Anion gap metabolic acidosis combined with respiratory acidosis and metabolic alkolosis  -unsure etiology for gas but likely 2/2 uremia, chronic respiratory failure HIWOT  -patient was intubated by anesthesia on  arrival  -cxr showed enlarged cardiomediastinal silhouette with central pulmonary vasculature edema   -patient's RR increased to 28 with  FiO2 50 PEEP 5 and following ABG with 7.25/54  -Scheduled nebs q4 and hold Solumedrol at this time     FEN/GI  -patient s/p 1 L NS  -patient NPO  -patient was hypovolemic hyponatremia at 129  -patient was hyoperkalemic at 6.6 at osh.  -Primary team is shifting  -protonix for gi ppx   -baseline bicarb according to chart check was 34. Today bicarb is 22  -Needs emergent dialysis to correct electrolyte derangement      Heme/ID  -patient meet sepsis criteria from OSH based on heart rate, respirations and wbc count  -possibly in shock due to sepsis  -patient with normocytic anemia  -Agree with broad spectrum antibiotics   - no known source; follow cxs  -Lactate normal; Patient without AGMA however patient probably chronic co2 retainer so this is anion gap acidotic for patient     Nephro  -trialysis catheter placed by surgery  -patient is SUBHASH on CKDIV which is now likely CKDV  -emergent dialysis needed     Endocrine  -A1c is 7.5   -patient placed on insulin gtt with q 1 blood sugars goal 140-180  -patient on metformin and glipizide +/- insulin at home will hold while hospitalized  - This could possibly be DKA so beta hydroxy ordered     Psych  Holding Psych meds at this time     Ppx: Hep and protonix     Dispo: Needs emergent dialysis. Continue to monitor ABGs and adjust vent accordningly       Thank you for allowing us to participate in the care of this patient.     Jan Duque MD  [facility] Internal Medicine [facility]

## 2017-01-25 NOTE — PHYSICIAN QUERY
PT Name: Adela Jessica  MR #: [id number]  Physician Query Form - Perfusion Diagnosis Clarification   Reviewer  Ext [phone] Makeda    This form is a permanent document in the medical record.     Query Date: January 25, 2017  By submitting this query, we are merely seeking further clarification of documentation. Please utilize your independent clinical judgment when addressing the question(s) below.    Indicators Supporting Clinical Findings Location in Medical Record   X Sepsis documented patient advanced to severe sepsis on arrival H&P 1/25     Acidosis documented     X  ABGs: pH=        pCO2=        HCO3=   PH=7.109-7.139  PCO2=77.0-85.8  HCO3=26.1-27.2 LAB 1/25   X HR=       RR=        BP=      Temp=  O2 sat= RR=  BP=69/42, 65/33, 73/41, 73/38 Flowsheet 1/25   X Hypotension or Low Blood Pressure documented patient was hypotensive on arrival with pressures only dopplerable H&P 1/25   X Confusion or Altered Mental Status   documented patient was only responsive to sternal rub on my exam H&P 1/25    Oliguria     X Medication/Treatment:  -Vasopressors  -Inotropic drugs  -IV fluids  -Oxygen  -Cardiac assist devices  -Hemodynamic monitoring  -Blood/Blood products norepinephrine 16 mg in dextrose 5 % 250 mL infusion    sodium chloride 0.9% bolus 1,000 mL MAR    History of AMI, End-Stage Cardiomyopathy, Valve Disorder      Diaphoresis,  cold extremities or cyanosis documented      CHF documented         EF=     X H&H=       WBC=        Lactic Acid= WBC=15.91  H&H=10.3/33.5 LAB 1/25    Other:       Provider, Please specify the diagnosis or diagnoses associated with above clinical findings.    [    ] Cardiogenic Shock                 [    ] Hemorrhagic Shock                 [    ] Hypovolemic Shock                 [    ] Septic Shock   [    ] Shock Kidney/Renal  [    ] Shock Liver  [    ] Shock Lung  [  x  ] Other Shock (Specify): __mixed picture at this time_______________________________  [    ]  Other Condition: ___________________________________  [    ] Clinically undetermined

## 2017-01-25 NOTE — PROGRESS NOTES
Consulted for placement of trialysis cath.  Chart reviewed and case discussed with primary team.  Consent obtained from family.  HD cath placed via R IJ with US guidance on first stick.  Gato and flushed easily.  CXR P.  Full note to follow.    Gucci Castillo MD

## 2017-01-25 NOTE — PLAN OF CARE
Problem: Patient Care Overview  Goal: Plan of Care Review  Outcome: Ongoing (interventions implemented as appropriate)  Pt on ventilatory support.  Bag and mask at bedside.  Alarms on and functioning with adequate alarm volume verified.  Will continue to monitor.

## 2017-01-25 NOTE — PLAN OF CARE
Have made several attempts to reach Dr. Lackey.  Will continue to try to reach him.    Hawa Blue MD  [facility] IM PGY3

## 2017-01-25 NOTE — PHYSICIAN QUERY
"PT Name: Adela Jessica  MR #: [id number]    Physician Query Form -Respiratory Condition Clarification    Reviewer  Ext [phone] Makeda    This form is a permanent document in the medical record.    Query Date: January 25, 2017    By submitting this query, we are merely seeking further clarification of documentation. Please utilize your independent clinical judgment when addressing the question(s) below.  (The Medical record reflects the following:)   Indicators   Supporting Clinical Findings Location in Medical Record   X "Wheezing", "Productive cough", "SOB", "ROBERT", "Use of accessory muscles" documented Today the patient complained of increased shortness of breath    End exp wheezes heard throughout H&P 1/25   X Chest X-Ray =  The cardiomediastinal silhouette appears enlarged with prominence of the central pulmonary vasculature suggesting underlying edema. There is no evidence of pneumothorax. CXR 1/25   X "Hypoxia" documented Encephalopathy likely 2/2 metabolic causes such as hypoxia and hypercapneia  H&P 1/25    Respiratory Distress or Failure documented     X RR=        PaO2=      PaCO2=      O2 sat= RR=    PO2=75-81  PCO2=77.0-85.8  O2 sat=89%-90% Flowsheet  1/25    LAB 1/25   X Treatment: albuterol-ipratropium 2.5mg-0.5mg/3mL nebulizer solution 3 mL      methylPREDNISolone sodium succinate injection 125 mg MAR   X BiPAP/Intubation patient was intubated shortly after the gas results H&P 1/25    Supplemental O2/Home O2:      Oxygen dependence      Other:     Provider, please specify diagnosis or diagnoses associated with above clinical findings.    [  x] Acute Respiratory Failure as mentioned in progress note  [  ] Chronic Respiratory Failure  [  ] Acute on Chronic Respiratory Failure  [  ] Other Respiratory Diagnosis (Specify)        [  ] Clinically Undetermined    Please document in your progress notes daily for the duration of treatment, until resolved, and include in your discharge summary.               "

## 2017-01-25 NOTE — ANESTHESIA PROCEDURE NOTES
Intubation    Diagnosis: acute resp fail  Staffing  Anesthesiologist: FRED MINOR  Performed by: anesthesiologist   Anesthesiologist was present at the time of the procedure.  Intubation  Indication: respiratory distress, respiratory failure  Induction: no Rx until after ETT in place. mask ventilation: easy mask.  Intubation: n/a, laryngoscopy direct utilizing bougie, Cate 4.  Endotracheal Tube: oral, 7.5 mm ID, cuffed (inflated to minimal occlusive pressure)  Attempts: 1, Epiglottis only visualized  Complicating Factors: anterior larynx, obesity and short neck (pt somnolent  until attempt at intubation => agitated)  Tube secured at 23 cm  Findings post-intubation: bilateral breath sounds  Position Confirmation: auscultation  Additional Notes  + ETCO2 + breath sounds about equal (awaiting CXR for confirmation of ETT position)

## 2017-01-25 NOTE — IP AVS SNAPSHOT
\[facility]\""  [address]  [place] [address]  Phone: [phone]           Patient Discharge Instructions     Our goal is to set you up for success. This packet includes information on your condition, medications, and your home care. It will help you to care for yourself so you don't get sicker and need to go back to the hospital.     Please ask your nurse if you have any questions.        There are many details to remember when preparing to leave the hospital. Here is what you will need to do:    1. Take your medicine. If you are prescribed medications, review your Medication List in the following pages. You may have new medications to  at the pharmacy and others that you'll need to stop taking. Review the instructions for how and when to take your medications. Talk with your doctor or nurses if you are unsure of what to do.     2. Go to your follow-up appointments. Specific follow-up information is listed in the following pages. Your may be contacted by a transition nurse or clinical provider about future appointments. Be sure we have all of the phone numbers to reach you, if needed. Please contact your provider's office if you are unable to make an appointment.     3. Watch for warning signs. Your doctor or nurse will give you detailed warning signs to watch for and when to call for assistance. These instructions may also include educational information about your condition. If you experience any of warning signs to your health, call your doctor.               ** Verify the list of medication(s) below is accurate and up to date. Carry this with you in case of emergency. If your medications have changed, please notify your healthcare provider.             Medication List      START taking these medications        Additional Info                      amlodipine 5 MG tablet   Commonly known as:  NORVASC   Quantity:  30 tablet   Refills:  11   Dose:  5 mg    Instructions:  Take 1 tablet  (5 mg total) by mouth once daily.     Begin Date    AM    Noon    PM    Bedtime       atorvastatin 40 MG tablet   Commonly known as:  LIPITOR   Quantity:  90 tablet   Refills:  3   Dose:  40 mg    Last time this was given:  80 mg on 1/30/2017  8:32 AM   Instructions:  Take 1 tablet (40 mg total) by mouth once daily.     Begin Date    AM    Noon    PM    Bedtime       moxifloxacin 400 mg tablet   Commonly known as:  AVELOX   Quantity:  5 tablet   Refills:  0   Dose:  400 mg    Instructions:  Take 1 tablet (400 mg total) by mouth once daily.     Begin Date    AM    Noon    PM    Bedtime         CHANGE how you take these medications        Additional Info                      glipiZIDE 5 MG tablet   Commonly known as:  GLUCOTROL   Quantity:  15 tablet   Refills:  1   Dose:  2.5 mg   What changed:  how much to take    Instructions:  Take 0.5 tablets (2.5 mg total) by mouth daily with breakfast.     Begin Date    AM    Noon    PM    Bedtime         CONTINUE taking these medications        Additional Info                      albuterol 90 mcg/actuation inhaler   Commonly known as:  PROVENTIL/VENTOLIN   Quantity:  2 each   Refills:  0   Dose:  2 puff   Comments:  MDI inhaler    Instructions:  Inhale 2 puffs into the lungs every 6 (six) hours as needed for Wheezing.     Begin Date    AM    Noon    PM    Bedtime       aspirin 325 MG tablet   Refills:  0   Dose:  325 mg    Instructions:  Take 325 mg by mouth once daily.     Begin Date    AM    Noon    PM    Bedtime       donepezil 5 MG tablet   Commonly known as:  ARICEPT   Refills:  0   Dose:  5 mg    Last time this was given:  5 mg on 1/29/2017  8:46 PM   Instructions:  Take 5 mg by mouth every evening.     Begin Date    AM    Noon    PM    Bedtime       duloxetine 60 MG capsule   Commonly known as:  CYMBALTA   Quantity:  30 capsule   Refills:  2   Dose:  60 mg    Instructions:  Take 1 capsule (60 mg total) by mouth once daily.     Begin Date    AM    Noon    PM    Bedtime        fish oil-omega-3 fatty acids 300-1,000 mg capsule   Refills:  0   Dose:  2 g    Instructions:  Take 2 g by mouth once daily.     Begin Date    AM    Noon    PM    Bedtime       fluticasone-salmeterol 100-50 mcg/dose 100-50 mcg/dose diskus inhaler   Commonly known as:  ADVAIR   Refills:  0   Dose:  1 puff    Instructions:  Inhale 1 puff into the lungs 2 (two) times daily.     Begin Date    AM    Noon    PM    Bedtime       multivitamin per tablet   Commonly known as:  THERAGRAN   Refills:  0   Dose:  1 tablet    Instructions:  Take 1 tablet by mouth once daily.     Begin Date    AM    Noon    PM    Bedtime       omega-3 acid ethyl esters 1 gram capsule   Commonly known as:  LOVAZA   Refills:  0   Dose:  2 g    Instructions:  Take 2 g by mouth 2 (two) times daily.     Begin Date    AM    Noon    PM    Bedtime       pentoxifylline 400 mg Tbsr   Commonly known as:  TRENTAL   Refills:  0   Dose:  400 mg    Last time this was given:  400 mg on 1/30/2017  8:31 AM   Instructions:  Take 400 mg by mouth 2 (two) times daily with meals.     Begin Date    AM    Noon    PM    Bedtime       pregabalin 50 MG capsule   Commonly known as:  LYRICA   Refills:  0   Dose:  50 mg    Instructions:  Take 50 mg by mouth 3 (three) times daily.     Begin Date    AM    Noon    PM    Bedtime         STOP taking these medications     albuterol sulfate 2.5 mg/0.5 mL Nebu       ferrous sulfate 325 mg (65 mg iron) Tab tablet       insulin detemir 100 unit/mL injection   Commonly known as:  LEVEMIR       lisinopril 5 MG tablet   Commonly known as:  PRINIVILZESTRIL       metformin 500 MG tablet   Commonly known as:  GLUCOPHAGE       omeprazole 40 MG capsule   Commonly known as:  PRILOSEC       simvastatin 20 MG tablet   Commonly known as:  ZOCOR       triazolam 0.25 MG Tab   Commonly known as:  HALCION            Where to Get Your Medications      You can get these medications from any pharmacy     Bring a paper prescription for each of these  medications     amlodipine 5 MG tablet    atorvastatin 40 MG tablet    glipiZIDE 5 MG tablet    moxifloxacin 400 mg tablet         Information about where to get these medications is not yet available     ! Ask your nurse or doctor about these medications     duloxetine 60 MG capsule                  Please bring to all follow up appointments:    1. A copy of your discharge instructions.  2. All medicines you are currently taking in their original bottles.  3. Identification and insurance card.    Please arrive 15 minutes ahead of scheduled appointment time.    Please call 24 hours in advance if you must reschedule your appointment and/or time.        Follow-up Information     Follow up with Cindy Spencer NP In 1 week.    Specialty:  Family Medicine    Why:  walk in clinic    Contact information:    [address] [facility] [id number] [phone]          Follow up with Ramon Lackey MD On 3/8/2017.    Specialty:  Nephrology    Why:  11:45am at [facility] ([address])- [place]  [address]- [phone]`    Contact information:    [address] [facility] [address]  [facility]  [facility] [id number] [phone]          Follow up with Trev Pacheco MD On 2/21/2017.    Specialties:  Pulmonary Disease, Critical Care Medicine    Why:  3:00pm    Contact information:    [address] [facility] [address]  [facility] [address] [phone]        Referrals     Future Orders    Ambulatory referral to Home Health     Ambulatory Referral to Pulmonology         Discharge Instructions     Future Orders    Call MD for:  difficulty breathing or increased cough     Call MD for:  increased confusion or weakness     Call MD for:  persistent dizziness, light-headedness, or visual disturbances     Call MD for:  persistent nausea and vomiting or diarrhea     Call MD for:  severe persistent headache     Call MD for:  temperature >100.4     Diet Cardiac     Diet Diabetic 1800 Calories     Diet renal     WALKER FOR HOME USE  "    Questions:    Type of Walker:  Heavy duty    With wheels?:  Yes    Height:  5' 3" (1.6 m)    Weight:  144.6 kg (318 lb 12.6 oz)    Length of need (1-99 months):  99    Platform attachment:  Bilateral    Accessories/Other:      Assistance needed:      Does patient have medical equipment at home?:  bath bench    CPAP    respiratory supplies    oxygen    Please check all that apply:  Patient's condition impairs ambulation.    Patient is unable to safely ambulate without equipment.      Discharge References/Attachments     HEART FAILURE, CONGESTIVE (ENGLISH)    HEART FAILURE: BEING ACTIVE (ENGLISH)    HEART FAILURE: MAKING CHANGES TO YOUR DIET (ENGLISH)    HEART FAILURE: TRACKING YOUR WEIGHT (ENGLISH)    HEART FAILURE: WARNING SIGNS OF A FLARE-UP (ENGLISH)    AMLODIPINE (ENGLISH)    ATORVASTATIN (ENGLISH)    MOXIFLOXACIN HYDROCHLORIDE ORAL TABLET (ENGLISH)        Primary Diagnosis     Your primary diagnosis was:  Shock      Admission Information     Date & Time Provider Department CSN    1/25/2017 12:56 AM Lauar Ledezma MD [facility] [id number]      Care Providers     Provider Role Specialty Primary office phone    Laura Ledezma MD Attending Provider Internal Medicine [phone]    Ramon Lackey MD Consulting Physician  Nephrology [phone]    Paulo Macias MD Consulting Physician  Anesthesiology [phone]      Your Vitals Were     BP Pulse Temp Resp Height Weight    100/62 (BP Location: Left arm, Patient Position: Lying, BP Method: Automatic) 89 98.2 °F (36.8 °C) (Oral) 21 5' 3" (1.6 m) 144.6 kg (318 lb 12.6 oz)    SpO2 BMI             93% 56.47 kg/m2         Recent Lab Values        11/11/2013 1/25/2017                        8:46 AM  4:00 AM          A1C 6.0 7.5 (H)          Comment for A1C at  4:00 AM on 1/25/2017:  According to ADA guidelines, hemoglobin A1C <7.0% represents  optimal control in non-pregnant diabetic patients.  Different  metrics may apply to specific " populations.   Standards of Medical Care in Diabetes - 2016.  For the purpose of screening for the presence of diabetes:  <5.7%     Consistent with the absence of diabetes  5.7-6.4%  Consistent with increasing risk for diabetes   (prediabetes)  >or=6.5%  Consistent with diabetes  Currently no consensus exists for use of hemoglobin A1C  for diagnosis of diabetes for children.        Allergies as of 1/30/2017     No Known Allergies      [facility]     [facility] Nurse Care Line - 24/7 Assistance  Unless otherwise directed by your provider, please contact [facility], our nurse care line that is available for 24/7 assistance.     Registered nurses in the [facility] provide clinical advisement, health education, appointment booking, and other advisory services.  Call for this free service at [phone].        Advance Directives     An advance directive is a document which, in the event you are no longer able to make decisions for yourself, tells your healthcare team what kind of treatment you do or do not want to receive, or who you would like to make those decisions for you.  If you do not currently have an advance directive, [facility] encourages you to create one.  For more information call:  [phone]), [phone] ([phone]),  or log on to [email or web address].        Language Assistance Services     ATTENTION: Language assistance services are available, free of charge. Please call [phone].      ATENCIÓN: Si habla español, tiene a whaley disposición servicios gratuitos de asistencia lingüística. Llame al [phone].     CHÚ Ý: N?u b?n nói Ti?ng Vi?t, có các d?ch v? h? tr? ngôn ng? mi?n phí dành cho b?n. G?i s? [phone].        Heart Failure Education       Heart Failure: Being Active  You have a condition called heart failure. Being active doesnt mean that you have to wear yourself out. Even a little movement each day helps to strengthen your  heart. If you cant get out to exercise, you can do simple stretching and strengthening exercises at home. These are good ways to keep you well-conditioned and prevent you and your heart from becoming excessively weak.    Ideas to get you started  · Add a little movement to things you do now. Walk to mail letters. Park your car at the far end of the parking lot and walk to the store. Walk up a flight of stairs instead of taking the elevator.  · Choose activities you enjoy. You might walk, swim, or ride an exercise bike. Things like gardening and washing the car count, too. Other possibilities include: washing dishes, walking the dog, walking around the mall, and doing aerobic activities with friends.  · Join a group exercise program at a [facility], a senior center, or a community center. Or look into a hospital cardiac rehabilitation program. Ask your doctor if you qualify.  Tips to keep you going  · Get up and get dressed each day. Go to a coffee shop and read a newspaper or go somewhere that you'll be in the presence of other active people. Youll feel more like being active.  · Make a plan. Choose one or more activities that you enjoy and that you can easily do. Then plan to do at least one each day. You might write your plan on a calendar.  · Go with a friend or a group if you like company. This can help you feel supported and stay motivated, too.  · Plan social events that you enjoy. This will keep you mentally engaged as well as physically motivated to do things you find pleasure in.  For your safety  · Talk with your healthcare provider before starting an exercise program.  · Exercise indoors when its too hot or too cold outside, or when the air quality is poor. Try walking at a shopping mall.  · Wear socks and sturdy shoes to maintain your balance and prevent falls.  · Start slowly. Do a few minutes several times a day at first. Increase your time and speed little by little.  · Stop and rest whenever you  feel tired or get short of breath.  · Dont push yourself on days when you dont feel well.  © 9920-0174 [employer]. [address] [place] [address], [place] [address]. All rights reserved. This information is not intended as a substitute for professional medical care. Always follow your healthcare professional's instructions.              Heart Failure: Evaluating Your Heart  You have a condition called heart failure. To evaluate your condition, your doctor will examine you, ask questions, and do some tests. Along with looking for signs of heart failure, the doctor looks for any other health problems that may have led to heart failure. The results of your evaluation will help your doctor form a treatment plan.  Health history and physical exam  Your visit will start with a health history. Tell the doctor about any symptoms youve noticed and about all medicines you take. Then youll have a physical exam. This includes listening to your heartbeat and breathing. Youll also be checked for swelling (edema) in your legs and neck. When you have fluid buildup or fluid in the lungs, it may be called congestive heart failure.  Diagnosing heart failure     During an echocardiogram, sound waves bounce off the heart. These are converted into a picture on the screen.   The following may be done to help your doctor form a diagnosis:  · X-rays show the size and shape of your heart. These pictures can also show fluid in your lungs.  · An electrocardiogram (ECG or EKG) shows the pattern of your heartbeat. Small pads (electrodes) are placed on your chest, arms, and legs. Wires connect the pads to the ECG machine, which records your hearts electrical signals. This can give the doctor information about heart function.  · An echocardiogram uses ultrasound waves to show the structure and movement of your heart muscle. This shows how well the heart pumps. It also shows the thickness of the heart walls, and if the heart is  enlarged. It is one of the most useful, non-invasive tests as it provides information about the heart's general function. This helps your doctor make treatment decisions.  · Lab tests evaluate small amounts of blood or urine for signs of problems. A BNP lab test can help diagnose and evaluate heart failure. BNP stands for B-type natriuretic peptide. The ventricles secrete more BNP when heart failure worsens. Lab tests can also provide information about metabolic dysfunction or heart dysfunction.  Your treatment plan  Based on the results of your evaluation and tests, your doctor will develop a treatment plan. This plan is designed to relieve some of your heart failure symptoms and help make you more comfortable. Your treatment plan may include:  · Medicine to help your heart work better and improve your quality of life  · Changes in what you eat and drink to help prevent fluid from backing up in your body  · Daily monitoring of your weight and heart failure symptoms to see how well your treatment plan is working  · Exercise to help you stay healthy  · Help with quitting smoking  · Emotional and psychological support to help adjust to the changes  · Referrals to other specialists to make sure you are being treated comprehensively  © 5476-7684 [employer]. [address] [place] [address]. All rights reserved. This information is not intended as a substitute for professional medical care. Always follow your healthcare professional's instructions.              Heart Failure: Making Changes to Your Diet  You have a condition called heart failure. When you have heart failure, excess fluid is more likely to build up in your body because your heart isn't working well. This makes the heart work harder to pump blood. Fluid buildup causes symptoms such as shortness of breath and swelling (edema). This is often referred to as congestive heart failure or CHF. Controlling the amount of salt (sodium) you  eat may help stop fluid from building up. Your doctor may also tell you to reduce the amount of fluid you drink.  Reading food labels    Your healthcare provider will tell you how much sodium you can eat each day. Read food labels to keep track. Keep in mind that certain foods are high in salt. These include canned, frozen, and processed foods. Check the amount of sodium in each serving. Watch out for high-sodium ingredients. These include MSG (monosodium glutamate), baking soda, and sodium phosphate.   Eating less salt  Give yourself time to get used to eating less salt. It may take a little while. Here are some tips to help:  · Take the saltshaker off the table. Replace it with salt-free herb mixes and spices.  · Eat fresh or plain frozen vegetables. These have much less salt than canned vegetables.  · Choose low-sodium snacks like sodium-free pretzels, crackers, or air-popped popcorn.  · Dont add salt to your food when youre cooking. Instead, season your foods with pepper, lemon, garlic, or onion.  · When you eat out, ask that your food be cooked without added salt.  · Avoid eating fried foods as these often have a great deal of salt.  If youre told to limit fluids  You may need to limit how much fluid you have to help prevent swelling. This includes anything that is liquid at room temperature, such as ice cream and soup. If your doctor tells you to limit fluid, try these tips:  · Measure drinks in a measuring cup before you drink them. This will help you meet daily goals.  · Chill drinks to make them more refreshing.  · Suck on frozen lemon wedges to quench thirst.  · Only drink when youre thirsty.  · Chew sugarless gum or suck on hard candy to keep your mouth moist.  · Weigh yourself daily to know if your body's fluid content is rising.  My sodium goal  Your healthcare provider may give you a sodium goal to meet each day. This includes sodium found in food as well as salt that you add. My goal is to eat no  more than ___________ mg of sodium per day.     When to call your doctor  Call your doctor right away if you have any symptoms of worsening heart failure. These can include:  · Sudden weight gain  · Increased swelling of your legs or ankles  · Trouble breathing when youre resting or at night  · Increase in the number of pillows you have to sleep on  · Chest pain, pressure, discomfort, or pain in the jaw, neck, or back   © 20007648-4650 [employer]. [address] [place] [address]. All rights reserved. This information is not intended as a substitute for professional medical care. Always follow your healthcare professional's instructions.              Heart Failure: Medicines to Help Your Heart    You have a condition called heart failure (also known as congestive heart failure, or CHF). Your doctor will likely prescribe medicines for heart failure and any underlying health problems you have. Most heart failure patients take one or more types of medicinen. Your healthcare provider will work to find the combination of medicines that works best for you.  Heart failure medicines  Here are the most common heart failure medicines:  · ACE inhibitors lower blood pressure and decrease strain on the heart. This makes it easier for the heart to pump. Angiotensin receptor blockers have similar effects. These are prescribed for some patients instead of ACE inhibitors.  · Beta-blockers relieve stress on the heart. They also improve symptoms. They may also improve the heart's pumping action over time.  · Diuretics (also called water pills) help rid your body of excess water. This can help rid your body of swelling (edema). Having less fluid to pump means your heart doesnt have to work as hard. Some diuretics make your body lose a mineral called potassium. Your doctor will tell you if you need to take supplements or eat more foods high in potassium.  · Digoxin helps your heart pump with more strength. This  helps your heart pump more blood with each beat. So, more oxygen-rich blood travels to the rest of the body.  · Aldosterone antagonists help alter hormones and decrease strain on the heart.  · Hydralazine and nitrates are two separate medicines used together to treat heart failure. They may come in one combination pill. They lower blood pressure and decrease how hard the heart has to pump.  Medicines for related conditions  Controlling other heart problems helps keep heart failure under control, too. Depending on other heart problems you have, medicines may be prescribed to:  · Lower blood pressure (antihypertensives).  · Lower cholesterol levels (statins).  · Prevent blood clots (anticoagulants or aspirin).  · Keep the heartbeat steady (antiarrhythmics).  © 9895-6261 [employer]. [address] [place] [address], [place] [address]. All rights reserved. This information is not intended as a substitute for professional medical care. Always follow your healthcare professional's instructions.              Heart Failure: Procedures That May Help    The heart is a muscle that pumps oxygen-rich blood to all parts of the body. When you have heart failure, the heart is not able to pump as well as it should. Blood and fluid may back up into the lungs (congestive heart failure), and some parts of the body dont get enough oxygen-rich blood to work normally. These problems lead to the symptoms of heart failure.     Certain procedures may help the heart pump better in some cases of heart failure. Some procedures are done to treat health problems that may have caused the heart failure such as coronary artery disease or heart rhythm problems. For more serious heart failure, other options are available.  Treating artery and valve problems  If you have coronary artery disease or valve disease, procedures may be done to improve blood flow. This helps the heart pump better, which can improve heart failure symptoms. First, your  doctor may do a cardiac catheterization to help detect clogged blood vessels or valve damage. During this procedure, a  thin tube (catheter) in inserted into a blood vessel and guided to the heart. There a dye is injected and a special type of X-ray (angiogram) is taken of the blood vessels. Procedures to open a blocked artery or fix damaged valves can also be done using catheterization.  · Angioplasty uses a balloon-tipped instrument at the end of the catheter. The balloon is inflated to widen the narrowed artery. In many cases, a stent is expanded to further support the narrowed artery. A stent is a metal mesh tube.  · Valve surgery repairs or replacement of faulty valves can also be done during catheterization so blood can flow properly through the chambers of the heart.  Bypass surgery is another option to help treat blocked arteries. It uses a healthy blood vessel from elsewhere in the body. The healthy blood vessel is attached above and below the blocked area so that blood can flow around the blocked artery.  Treating heart rhythm problems  A device may be placed in the chest to help a weak heart maintain a healthy, heartbeat so the heart can pump more effectively:  · Pacemaker. A pacemaker is an implanted device that regulates your heartbeat electronically. It monitors your heart's rhythm and generates a painless electric impulse that helps the heart beat in a regular rhythm. A pacemaker is programmed to meet your specific heart rhythm needs.  · Biventricular pacing/cardiac resynchronization therapy. A type of pacemaker that paces both pumping chambers of the heart at the same time to coordinate contractions and to improve the heart's function. Some people with heart failure are candidates for this therapy.  · Implantable cardioverter defibrillator. A device similar to a pacemaker that senses when the heart is beating too fast and delivers an electrical shock to convert the fast rhythm to a normal rhythm.  This can be a life saving device.  In severe cases  In more serious cases of heart failure when other treatments no longer work, other options may include:  · Ventricular assist devices (VADs). These are mechanical devices used to take over the pumping function for one or both of the heart's ventricles, or pumping chambers. A VAD may be necessary when heart failure progresses to the point that medicines and other treatments no longer help. In some cases, a VAD may be used as a bridge to transplant.  · Heart transplant. This is replacing the diseased heart with a healthy one from a donor. This is an option for a few people who are very sick. A heart transplant is very serious and not an option for all patients. Your doctor can tell you more.  © 0885-6593 [employer]. [address] [place] [address], [place] [address]. All rights reserved. This information is not intended as a substitute for professional medical care. Always follow your healthcare professional's instructions.              Heart Failure: Tracking Your Weight  You have a condition called heart failure. When you have heart failure, a sudden weight gain or a steady rise in weight is a warning sign that your body is retaining too much water and salt. This could mean your heart failure is getting worse. If left untreated, it can cause problems for your lungs and result in shortness of breath. Weighing yourself each day is the best way to know if youre retaining water. If your weight goes up quickly, call your doctor. You will be given instructions on how to get rid of the excess water. You will likely need medicines and to avoid salt. This will help your heart work better.  Call your doctor if you gain more than 2 pounds in 1 day, more than 5 pounds in 1 week, or whatever weight gain you were told to report by your doctor. This is often a sign of worsening heart failure and needs to be evaluated and treated. Your doctor will tell you what to do next.    Tips for weighing yourself    · Weigh yourself at the same time each morning, wearing the same clothes. Weigh yourself after urinating and before eating.  · Use the same scale each day. Make sure the numbers are easy to read. Put the scale on a flat, hard surface -- not on a rug or carpet.  · Do not stop weighing yourself. If you forget one day, weigh again the next morning.  How to use your weight chart  · Keep your weight chart near the scale. Write your weight on the chart as soon as you get off the scale.  · Fill in the month and the start date on the chart. Then write down your weight each day. Your chart will look like this:    · If you miss a day, leave the space blank. Weigh yourself the next day and write your weight in the next space.  · Take your weight chart with you when you go to see your doctor.  © 1561-9256 [employer]. [address] [place] [address], [place] [address]. All rights reserved. This information is not intended as a substitute for professional medical care. Always follow your healthcare professional's instructions.              Heart Failure: Warning Signs of a Flare-Up  You have a condition called heart failure. Once you have heart failure, flare-ups can happen. Below are signs that can mean your heart failure is getting worse. If you notice any of these warning signs, call your healthcare provider.  Swelling    · Your feet, ankles, or lower legs get puffier.  · You notice skin changes on your lower legs.  · Your shoes feel too tight.  · Your clothes are tighter in the waist.  · You have trouble getting rings on or off your fingers.  Shortness of breath  · You have to breathe harder even when youre doing your normal activities or when youre resting.  · You are short of breath walking up stairs or even short distances.  · You wake up at night short of breath or coughing.  · You need to use more pillows or sit up to sleep.  · You wake up tired or restless.  Other warning  signs  · You feel weaker, dizzy, or more tired.  · You have chest pain or changes in your heartbeat.  · You have a cough that wont go away.  · You cant remember things or dont feel like eating.  Tracking your weight  Gaining weight is often the first warning sign that heart failure is getting worse. Gaining even a few pounds can be a sign that your body is retaining excess water and salt. Weighing yourself each day in the morning after you urinate and before you eat, is the best way to know if you're retaining water. Get a scale that is easy to read and make sure you wear the same clothes and use the same scale every time you weigh. Your healthcare provider will show you how to track your weight. Call your doctor if you gain more than 2 pounds in 1 day, 5 pounds in 1 week, or whatever weight gain you were told to report by your doctor. This is often a sign of worsening heart failure and needs to be evaluated and treated before it compromises your breathing. Your doctor will tell you what to do next.    © 5753-3288 [employer]. [address] [place] [address]. All rights reserved. This information is not intended as a substitute for professional medical care. Always follow your healthcare professional's instructions.              Pneumonmia Discharge Instructions                Chronic Kindey Disease Education             Diabetes Discharge Instructions                                   [facility] Sign-Up     Activating your [facility] account is as easy as 1-2-3!     1) Visit [email or web address], select Sign Up Now, enter this activation code and your date of birth, then select Next.  [email or web address]  Expires: 2/13/2017  3:58 PM      2) Create a username and password to use when you visit [facility] in the future and select a security question in case you lose your password and select Next.    3) Enter your e-mail address and click Sign Up!    Additional Information  If you have questions,  please e-mail [email or web address] or call [phone] to talk to our [facility] staff. Remember, [facility] is NOT to be used for urgent needs. For medical emergencies, dial 911.          [facility] complies with applicable Federal civil rights laws and does not discriminate on the basis of race, color, national origin, age, disability, or sex.

## 2017-01-25 NOTE — CHAPLAIN
Patient is sedated and intubated.  Sisters, nephew and his wife, and several other family members have been here for an extended period of time.  They would like patient anointed with the sacrament of the sick.  I called for a  and provided listening support to family.

## 2017-01-25 NOTE — H&P
[facility] Internal Medicine History and Physical - Resident Note    Admitting Team: Team A  Attending Physician: Brisa  Resident: Mirza  Interns: Alberto    Date of Admit: 1/25/2017    Chief Complaint     Sob x 1 week    Subjective:      History of Present Illness:  Adela Jessica is a 76 y.o.  female who  has a past medical history of Arthritis; CKD (chronic kidney disease); Dementia; Depression; DM2 (diabetes mellitus, type 2); GERD (gastroesophageal reflux disease); HLD (hyperlipidemia); HTN (hypertension); and HIWOT on CPAP.. The patient presented to [facility] on 1/25/2017 with a primary complaint of No chief complaint on file.      History unable to be provided by patient.  The history was taken by granddaughter.  The patient was in their usual state of health until about 1 month ago.  She developed some cough.  It was non-productive.  About a week ago the patient became more somnolent and was sleeping more than usual.  Today the patient complained of increased shortness of breath and was doing funny things such as touching her face and things that weren't there and wasn't making much sense when speaking.  The granddaughter was alarmed and called 911.    Denies fevers, chills, nausea, vomiting.  She has been sleeping more than usual so not eating and drinking at a normal level.    As per EMS they have been called to the house many times for frequent falls and confusion today.  The patient has demanded that the EMS team leave and signed out AMA.  The last time the team refused to leave and brought the patient to the outside hospital.    Past Medical History:  Past Medical History   Diagnosis Date    Arthritis     CKD (chronic kidney disease)     Dementia     Depression     DM2 (diabetes mellitus, type 2)     GERD (gastroesophageal reflux disease)     HLD (hyperlipidemia)     HTN (hypertension)     HIWOT on CPAP        Past Surgical History:  Past Surgical History   Procedure  Laterality Date    Hysterectomy         Allergies:  Review of patient's allergies indicates:  No Known Allergies    Home Medications:  Prior to Admission medications    Medication Sig Start Date End Date Taking? Authorizing Provider   albuterol (PROVENTIL/VENTOLIN) 90 mcg/actuation inhaler Inhale 2 puffs into the lungs every 6 (six) hours as needed for Wheezing. 11/13/13 3/6/16  Quang Hernández MD   aspirin 325 MG tablet Take 325 mg by mouth once daily.    Historical Provider, MD   donepezil (ARICEPT) 5 MG tablet Take 5 mg by mouth every evening.    Historical Provider, MD   fish oil-omega-3 fatty acids 300-1,000 mg capsule Take 2 g by mouth once daily.    Historical Provider, MD   fluticasone-salmeterol 100-50 mcg/dose (ADVAIR) 100-50 mcg/dose diskus inhaler Inhale 1 puff into the lungs 2 (two) times daily.    Historical Provider, MD   lisinopril (PRINIVIL,ZESTRIL) 5 MG tablet Take 5 mg by mouth once daily.    Historical Provider, MD   metformin (GLUCOPHAGE) 500 MG tablet Take 1 tablet (500 mg total) by mouth 2 (two) times daily with meals. 11/13/13   Quang Hernández MD   multivitamin (THERAGRAN) per tablet Take 1 tablet by mouth once daily.    Historical Provider, MD   omega-3 acid ethyl esters (LOVAZA) 1 gram capsule Take 2 g by mouth 2 (two) times daily.    Historical Provider, MD   omeprazole (PRILOSEC) 40 MG capsule Take 40 mg by mouth once daily.    Historical Provider, MD   pentoxifylline (TRENTAL) 400 mg TbSR Take 400 mg by mouth 2 (two) times daily with meals.    Historical Provider, MD   pregabalin (LYRICA) 50 MG capsule Take 50 mg by mouth 3 (three) times daily.    Historical Provider, MD   simvastatin (ZOCOR) 20 MG tablet Take 80 mg by mouth every evening.     Historical Provider, MD   triazolam (HALCION) 0.25 MG Tab Take 0.125 mg by mouth nightly as needed.    Historical Provider, MD   albuterol sulfate 2.5 mg/0.5 mL Nebu Take 2.5 mg by nebulization every 4 (four) hours as needed. 3/6/16 1/25/17  Josh HILLS  MD Luis A   duloxetine (CYMBALTA) 60 MG capsule Take 60 mg by mouth once daily.  1/25/17  Historical Provider, MD   ferrous sulfate 325 mg (65 mg iron) Tab tablet Take 325 mg by mouth daily with breakfast.  1/25/17  Historical Provider, MD   glipiZIDE (GLUCOTROL) 5 MG tablet Take 5 mg by mouth daily with breakfast.  1/25/17  Historical Provider, MD   insulin detemir (LEVEMIR) 100 unit/mL injection Inject 18 Units into the skin every evening.  1/25/17  Historical Provider, MD       Family History:  No family history on file.    Social History:  Social History   Substance Use Topics    Smoking status: Never Smoker    Smokeless tobacco: Not on file    Alcohol use No       Review of Systems:  Pertinent items are noted in HPI. All other systems are reviewed and are negative.  Un able to be completed because patient was not awake or alert    Health Maintaince :   Primary Care Physician: Allison  Unable to obtain rest of hm 2/2 family not knowing and patient couldn't answer questions.     Objective:   Last 24 Hour Vital Signs:  No Data Recorded  There is no height or weight on file to calculate BMI.       Physical Examination:  Gen:  Patient asleep, not easily aroused  HEENT:  PERRLA, dry lips and mucus membranes  Neck:  Unable to appreicated jvp 2/2 patient's body habitus  CV:  Distant heart sounds, unable to appreciate murmurs  Resp:  End exp wheezes heard throughout, decreased air movement at bases bilaterally  Abd:  Full, soft, non tender, non distended, old scar +BS  Ext:  2+ pitting edema, dry flaky skin   Skin:  Dry, flaky, peeling over lower extremities  Neuro:  arrousable only to sternal rub.  Does localize to pain.  Laboratory:  Most Recent Data:  CBC:   Lab Results   Component Value Date    WBC 7.50 12/27/2016    HGB 13.1 12/27/2016    HCT 43.4 12/27/2016     (H) 12/27/2016    MCV 98 12/27/2016    RDW 14.1 12/27/2016       BMP:   Lab Results   Component Value Date     12/27/2016    K 4.6  12/27/2016    CL 95 12/27/2016    CO2 34 (H) 12/27/2016    BUN 27 (H) 12/27/2016    CREATININE 1.46 (H) 12/27/2016     (H) 12/27/2016    CALCIUM 9.4 12/27/2016    MG 1.9 11/13/2013    PHOS 2.5 (L) 11/13/2013     LFTs:   Lab Results   Component Value Date    PROT 8.4 12/27/2016    ALBUMIN 4.1 12/27/2016    BILITOT 0.6 12/27/2016    AST 16 12/27/2016    ALKPHOS 98 12/27/2016    ALT 19 12/27/2016     Coags:   Lab Results   Component Value Date    INR 1.1 03/06/2016     FLP:   Lab Results   Component Value Date    CHOL 162 11/11/2013    HDL 49 11/11/2013    LDLCALC 92.8 11/11/2013    TRIG 101 11/11/2013    CHOLHDL 30.2 11/11/2013     DM:   Lab Results   Component Value Date    HGBA1C 6.0 11/11/2013    LDLCALC 92.8 11/11/2013    CREATININE 1.46 (H) 12/27/2016     Thyroid:   Lab Results   Component Value Date    TSH 1.086 11/11/2013     Anemia: No results found for: IRON, TIBC, FERRITIN, CXTRTGLE07, FOLATE  Cardiac:   Lab Results   Component Value Date    TROPONINI 0.014 03/06/2016     03/06/2016     Urinalysis:   Lab Results   Component Value Date    LABURIN No growth 11/11/2013    COLORU Yellow 12/30/2016    SPECGRAV 1.015 12/30/2016    NITRITE Negative 12/30/2016    KETONESU Negative 12/30/2016    UROBILINOGEN Negative 12/30/2016    WBCUA 1 11/11/2013       Trended Lab Data:  No results for input(s): WBC, HGB, HCT, PLT, MCV, RDW, NA, K, CL, CO2, BUN, CREATININE, GLU, PROT, ALBUMIN, BILITOT, AST, ALKPHOS, ALT in the last 168 hours.    Trended Cardiac Data:  No results for input(s): TROPONINI, CKTOTAL, CKMB, BNP in the last 168 hours.    Microbiology Data:    Blood and urine culture and respiratory culture pending  Other Laboratory Data:  none    Other Results:  EKG (my interpretation): first degreee av block, LAD    Radiology:  Imaging Results     None               Assessment:     Adela Jessica is a 76 y.o. female with:  Patient Active Problem List    Diagnosis Date Noted    SUBHASH (acute kidney injury)  "01/25/2017    Hyperkalemia 11/11/2013    CKD (chronic kidney disease) stage 4, GFR 15-29 ml/min 11/11/2013    Leukocytosis 11/11/2013    DM (diabetes mellitus) 11/11/2013    COPD (chronic obstructive pulmonary disease) 11/11/2013    HTN (hypertension) 11/11/2013    HLD (hyperlipidemia) 11/11/2013    Elevated troponin 11/11/2013    Acute respiratory failure 11/10/2013        Plan:     76 yr old female w/ pmhx of ckd IV, DM, copd, HTN, HPL, HIWOT was transferred from OSH for "copd exacerbation and need for dialysis".    Neuro    Encephalopathy  - likely 2/2 metabolic causes such as hypoxia and hypercapneia could also be septic in nature vs cva vs toxidrome vs uremia vs dementia  -CT head ordered but not done due to patient's tenuous mental status  -patient was only responsive to sternal rub on my exam  -patient placed on precedex for sedation  -may need MRI for cva workup when patient is stable    CV  Shock  -most likely from multiple etiologies septic vs cardiogenic vs hypovolemic  -patient was hypotensive on arrival with pressures only dopplerable  -fluids wide open   -will consult surgery for trialysis catheter and will start pressors prn; hold home bp meds    Intermediate troponin  -troponin was intermediate at OSH; will trend troponins and ekgs  -ekg without signs of ischemia  -echo ordered   -osh with cardiomegaly  -patient on Pentoxyphylline for PAD? Patient on statin will obtain lipid panel and restart statin when tolerating po    Resp  Acute Hypercapneic and hypoxic respiratory failure  -patient was non-responsive with abg of ~ 7.1/85  -patient was intubated shortly after the gas results  -cxr ordered   -patient with repeat abg one hour after intubation  -pulmonary consulted   copd exacerbation   - scheduled nebs and steroids    FEN/GI  Hyponatremia  And hyperkalemia  -patient s/p 1 L NS  -patient NPO  -patient was hypovolemic hyponatremia at 129  -patient was hyperkalemic at 5.4 at osh.  Patient not " shifted so insulin, d50, kayaxalate and repeat bmp  -protonix for gi ppx     Heme/ID  SEPSIS possible septic shock  -patient meet sepsis criteria from OSH based on heart rate, respirations and wbc count  -patient advanced to severe sepsis on arrival; has so far been responsive to fluids  -will consult surgery for trialysis cath and start pressors prn  -patient on vanc, zosyn, cipro patient was pancultured  -no suspected source at this time; will follow cultures; lactate negative  normocytic anemia-new studies ordered   AGMA  -however patient probably chronic co2 retainer so this is probably acidotic for patient  -lactate negative likely 2/2 renal failure    nephro  Acute Renal Failure with hyperkalemia  -trialysis catheter will be placed  -patient is SUBHASH on CKDIV likely advanced to CKDV  -renal consulted  -hyperkalemia treated as above  -urine lytes ordered    Endocrine  Diabetes with neuropathy  -A1c ordered  -patient placed on insulin gtt with q 1 blood sugars goal 140-180  -patient on metformin and glipizide +/- insulin at home will hold while hospitalized  -will continue lyrica for neuropathy as tolerates po    Psych  HIstory of dementia and depression  -patient with history of dementia on donepezil; will hold  -patient on cumbalta, lyrica; will hold    Ppx:  Hep and protonix    Dispo:  Patient remains critically ill.        Code Status:     Full code    Hawa Blue  [facility] Internal Medicine HO-3  U IM Service    [facility] Medicine Hospitalist Pager numbers:   [facility] Hospitalist Medicine Team A (Izabela/Brisa): [phone]  [facility] Hospitalist Medicine Team B (Bart/Benjamin):  [phone]

## 2017-01-25 NOTE — PROGRESS NOTES
Results for PAYTON SIMON (MRN [id number]) as of 1/25/2017 05:54   Ref. Range 1/25/2017 05:17   POC PH Latest Ref Range: 7.35 - 7.45  7.139 (LL)   POC PCO2 Latest Ref Range: 35 - 45 mmHg 77.0 (HH)   POC PO2 Latest Ref Range: 80 - 100 mmHg 75 (L)   POC BE Latest Ref Range: -2 to 2 mmol/L -3   POC HCO3 Latest Ref Range: 24 - 28 mmol/L 26.1   POC SATURATED O2 Latest Ref Range: 95 - 100 % 89 (L)   POC TCO2 Latest Ref Range: 23 - 27 mmol/L 28 (H)   FiO2 Unknown 50   Vt Unknown 420   PEEP Unknown 5   Sample Unknown ARTERIAL   DelSys Unknown Adult Vent   Allens Test Unknown N/A   Site Unknown LB   Mode Unknown AC/PRVC     Results noted to physician.  Ventilator changes were indicated and complied with.

## 2017-01-25 NOTE — PROGRESS NOTES
Called to pt rm. Upon arrival, pt unresponsive to sternal rub. Pt intubated with 7.5 ET tube at 23cm at the lip; positive color changed on ETCO2 monitor with BBS; CXR ordered. Pt transferred to ICU on 100% ambu bag and placed on ventilator as charted on vent flowsheet; alarms on and functioning with adequate volume; ambu bag/mask at bedside; will continue to monitor.

## 2017-01-25 NOTE — PROCEDURES
Date: 01/25/2017  Time: 12:38 PM  Indication: Hemodynamic monitoring  Supervising physicians: dr Trev Pacheco (Staff), Dr. Heber Hernández (fellow)    A time-out was completed verifying correct patient, procedure, site, positioning, and special equipment if applicable. Moraless test was performed to ensure adequate perfusion. The patients right Right wrist was prepped with chlorhexadine and draped in sterile fashion.  An 18G Arrow arterial line was introduced into the radial artery. The catheter was threaded over the guide wire and the needle was removed with appropriate pulsatile blood return. The catheter was then sutured in place to the skin and a sterile dressing applied. Perfusion to the extremity distal to the point of catheter insertion was checked and found to be adequate. The patient tolerated the procedure well and there were no complications.    Jacob Anderson MD

## 2017-01-25 NOTE — PLAN OF CARE
TN rounded and inquired in ICU waiting room, no family, no family contact information found in chart, patient is intubated and sedated, ER notes indicate family member gave history there.  Unknown DME and living environment at this time.     01/25/17 0928   Discharge Assessment   Assessment Type Discharge Planning Assessment   Confirmed/corrected address and phone number on facesheet? No   Assessment information obtained from? Medical Record   Prior to hospitilization cognitive status: Unable to Assess   Current cognitive status: Unable to Assess   Current Functional Status: Completely Dependent   Arrived From admitted as an inpatient   Lives With other (see comments)  (unable to determine)   Able to Return to Prior Arrangements unable to determine at this time (comments)   Is patient able to care for self after discharge? Unable to determine at this time (comments)   How many people do you have in your home that can help with your care after discharge? other (see comments)  (unknown)   Who are your caregiver(s) and their phone number(s)? TN called # on record for family member and it was wrong, TN deleted this number from account   Patient's perception of discharge disposition (unable to determine)   Readmission Within The Last 30 Days no previous admission in last 30 days   Patient currently being followed by outpatient case management? No   Do you have any problems affording any of your prescribed medications? TBD   Does the patient receive services at the [facility]? No   Discharge Plan A Home Health   Discharge Plan B Skilled Nursing Facility   Quang Church RN  Transitional Navigator/Case Management  [phone]

## 2017-01-25 NOTE — NURSING
Pt transferred from  to  263 (pt was intubated on ). Pt placed on vent and cardiac monitor. B/p low on transfer. Levo and precedex started. Dr. Castillo to room and started dialysis catheter. VS improved with Levo. Cuirel placed with no urine output. Multiple antibx began. Labs and xrays. Guarded.

## 2017-01-25 NOTE — PROCEDURES
CENTRAL VENOUS LINE INSERTION:    Antisepsis: sterile gloves, mask, gown, and drape.  Skin prepped with chlorhexidine.  Catheter: other via right internal jugular.  Insertion directed by ultrasound.  Heme positive aspiration all ports.  Secured with sutures at 15 cm at skin.  Dressing: dry sterile gauze and bio occlusive dressing.  Complications: none.    CXR P     Gucci Castillo MD

## 2017-01-25 NOTE — PROGRESS NOTES
Results for PAYTON SIMON (MRN [id number]) as of 1/25/2017 05:25   Ref. Range 1/25/2017 05:17   POC PH Latest Ref Range: 7.35 - 7.45  7.139 (LL)   POC PCO2 Latest Ref Range: 35 - 45 mmHg 77.0 (HH)   POC PO2 Latest Ref Range: 80 - 100 mmHg 75 (L)   POC BE Latest Ref Range: -2 to 2 mmol/L -3   POC HCO3 Latest Ref Range: 24 - 28 mmol/L 26.1   POC SATURATED O2 Latest Ref Range: 95 - 100 % 89 (L)   POC TCO2 Latest Ref Range: 23 - 27 mmol/L 28 (H)   FiO2 Unknown 50   Vt Unknown 420   PEEP Unknown 5   Sample Unknown ARTERIAL   DelSys Unknown Adult Vent   Allens Test Unknown N/A   Site Unknown LB   Mode Unknown AC/PRVC   Results reported to MD at bedside; RR increased to 25; see vent flowsheet.

## 2017-01-26 PROBLEM — F02.80 ALZHEIMER'S DEMENTIA WITHOUT BEHAVIORAL DISTURBANCE: Status: ACTIVE | Noted: 2017-01-25

## 2017-01-26 PROBLEM — J43.2 CENTRILOBULAR EMPHYSEMA: Status: ACTIVE | Noted: 2017-01-26

## 2017-01-26 PROBLEM — G30.9 ALZHEIMER'S DEMENTIA WITHOUT BEHAVIORAL DISTURBANCE: Status: ACTIVE | Noted: 2017-01-25

## 2017-01-26 PROBLEM — R57.0 CARDIOGENIC SHOCK: Status: ACTIVE | Noted: 2017-01-25

## 2017-01-26 LAB
ALBUMIN SERPL BCP-MCNC: 2 G/DL
ALBUMIN SERPL BCP-MCNC: 2.2 G/DL
ALBUMIN SERPL BCP-MCNC: 2.2 G/DL
ALLENS TEST: ABNORMAL
ALLENS TEST: ABNORMAL
ALP SERPL-CCNC: 113 U/L
ALT SERPL W/O P-5'-P-CCNC: 13 U/L
ANION GAP SERPL CALC-SCNC: 13 MMOL/L
ANION GAP SERPL CALC-SCNC: 13 MMOL/L
ANION GAP SERPL CALC-SCNC: 9 MMOL/L
ANISOCYTOSIS BLD QL SMEAR: SLIGHT
AST SERPL-CCNC: 12 U/L
BACTERIA UR CULT: NO GROWTH
BASOPHILS # BLD AUTO: ABNORMAL K/UL
BASOPHILS NFR BLD: 0 %
BILIRUB SERPL-MCNC: 0.3 MG/DL
BUN SERPL-MCNC: 75 MG/DL
BUN SERPL-MCNC: 78 MG/DL
BUN SERPL-MCNC: 78 MG/DL
CALCIUM SERPL-MCNC: 8.7 MG/DL
CHLORIDE SERPL-SCNC: 94 MMOL/L
CHLORIDE SERPL-SCNC: 94 MMOL/L
CHLORIDE SERPL-SCNC: 96 MMOL/L
CO2 SERPL-SCNC: 25 MMOL/L
CO2 SERPL-SCNC: 25 MMOL/L
CO2 SERPL-SCNC: 31 MMOL/L
CREAT SERPL-MCNC: 5.7 MG/DL
CREAT SERPL-MCNC: 6.8 MG/DL
CREAT SERPL-MCNC: 6.8 MG/DL
DELSYS: ABNORMAL
DIFFERENTIAL METHOD: ABNORMAL
EOSINOPHIL # BLD AUTO: ABNORMAL K/UL
EOSINOPHIL NFR BLD: 0 %
ERYTHROCYTE [DISTWIDTH] IN BLOOD BY AUTOMATED COUNT: 14.8 %
ERYTHROCYTE [SEDIMENTATION RATE] IN BLOOD BY WESTERGREN METHOD: 34 MM/H
ERYTHROCYTE [SEDIMENTATION RATE] IN BLOOD BY WESTERGREN METHOD: 34 MM/H
EST. GFR  (AFRICAN AMERICAN): 6 ML/MIN/1.73 M^2
EST. GFR  (AFRICAN AMERICAN): 6 ML/MIN/1.73 M^2
EST. GFR  (AFRICAN AMERICAN): 8 ML/MIN/1.73 M^2
EST. GFR  (NON AFRICAN AMERICAN): 5 ML/MIN/1.73 M^2
EST. GFR  (NON AFRICAN AMERICAN): 5 ML/MIN/1.73 M^2
EST. GFR  (NON AFRICAN AMERICAN): 7 ML/MIN/1.73 M^2
FIO2: 40
FIO2: 45
FIO2: 50
GLUCOSE SERPL-MCNC: 158 MG/DL
GLUCOSE SERPL-MCNC: 173 MG/DL
GLUCOSE SERPL-MCNC: 173 MG/DL
HBV CORE IGM SERPL QL IA: NEGATIVE
HBV SURFACE AB SER-ACNC: NEGATIVE M[IU]/ML
HBV SURFACE AG SERPL QL IA: NEGATIVE
HCO3 UR-SCNC: 25.2 MMOL/L (ref 24–28)
HCO3 UR-SCNC: 30.1 MMOL/L (ref 24–28)
HCO3 UR-SCNC: 30.9 MMOL/L (ref 24–28)
HCT VFR BLD AUTO: 34.9 %
HGB BLD-MCNC: 11.2 G/DL
HYPOCHROMIA BLD QL SMEAR: ABNORMAL
LYMPHOCYTES # BLD AUTO: ABNORMAL K/UL
LYMPHOCYTES NFR BLD: 6 %
MAGNESIUM SERPL-MCNC: 1.8 MG/DL
MCH RBC QN AUTO: 29.9 PG
MCHC RBC AUTO-ENTMCNC: 32.1 %
MCV RBC AUTO: 93 FL
MODE: ABNORMAL
MONOCYTES # BLD AUTO: ABNORMAL K/UL
MONOCYTES NFR BLD: 0 %
MYELOCYTES NFR BLD MANUAL: 2 %
NEUTROPHILS NFR BLD: 89 %
NEUTS BAND NFR BLD MANUAL: 3 %
OVALOCYTES BLD QL SMEAR: ABNORMAL
PCO2 BLDA: 51.1 MMHG (ref 35–45)
PCO2 BLDA: 55.8 MMHG (ref 35–45)
PCO2 BLDA: 59.2 MMHG (ref 35–45)
PEEP: 5
PH SMN: 7.3 [PH] (ref 7.35–7.45)
PH SMN: 7.33 [PH] (ref 7.35–7.45)
PH SMN: 7.34 [PH] (ref 7.35–7.45)
PHOSPHATE SERPL-MCNC: 5.8 MG/DL
PHOSPHATE SERPL-MCNC: 5.9 MG/DL
PLATELET # BLD AUTO: 419 K/UL
PLATELET BLD QL SMEAR: ABNORMAL
PMV BLD AUTO: 8.9 FL
PO2 BLDA: 119 MMHG (ref 80–100)
PO2 BLDA: 86 MMHG (ref 80–100)
PO2 BLDA: 98 MMHG (ref 80–100)
POC BE: -1 MMOL/L
POC BE: 4 MMOL/L
POC BE: 5 MMOL/L
POC SATURATED O2: 96 % (ref 95–100)
POC SATURATED O2: 97 % (ref 95–100)
POC SATURATED O2: 98 % (ref 95–100)
POC TCO2: 27 MMOL/L (ref 23–27)
POC TCO2: 32 MMOL/L (ref 23–27)
POC TCO2: 33 MMOL/L (ref 23–27)
POCT GLUCOSE: 106 MG/DL (ref 70–110)
POCT GLUCOSE: 114 MG/DL (ref 70–110)
POCT GLUCOSE: 128 MG/DL (ref 70–110)
POCT GLUCOSE: 136 MG/DL (ref 70–110)
POCT GLUCOSE: 143 MG/DL (ref 70–110)
POCT GLUCOSE: 150 MG/DL (ref 70–110)
POCT GLUCOSE: 156 MG/DL (ref 70–110)
POCT GLUCOSE: 163 MG/DL (ref 70–110)
POCT GLUCOSE: 168 MG/DL (ref 70–110)
POCT GLUCOSE: 173 MG/DL (ref 70–110)
POCT GLUCOSE: 177 MG/DL (ref 70–110)
POCT GLUCOSE: 184 MG/DL (ref 70–110)
POCT GLUCOSE: 187 MG/DL (ref 70–110)
POCT GLUCOSE: 201 MG/DL (ref 70–110)
POCT GLUCOSE: 77 MG/DL (ref 70–110)
POCT GLUCOSE: 97 MG/DL (ref 70–110)
POIKILOCYTOSIS BLD QL SMEAR: SLIGHT
POLYCHROMASIA BLD QL SMEAR: ABNORMAL
POTASSIUM SERPL-SCNC: 4.8 MMOL/L
POTASSIUM SERPL-SCNC: 4.8 MMOL/L
POTASSIUM SERPL-SCNC: 5 MMOL/L
PROT SERPL-MCNC: 7.2 G/DL
PS: 5
RBC # BLD AUTO: 3.75 M/UL
SAMPLE: ABNORMAL
SITE: ABNORMAL
SODIUM SERPL-SCNC: 132 MMOL/L
SODIUM SERPL-SCNC: 132 MMOL/L
SODIUM SERPL-SCNC: 136 MMOL/L
SP02: 100
SP02: 93
SPONT RATE: 18
TROPONIN I SERPL DL<=0.01 NG/ML-MCNC: 0.09 NG/ML
VANCOMYCIN SERPL-MCNC: 9.6 UG/ML
VT: 350
VT: 350
WBC # BLD AUTO: 23.25 K/UL

## 2017-01-26 PROCEDURE — 94002 VENT MGMT INPAT INIT DAY: CPT

## 2017-01-26 PROCEDURE — 27000190 HC CPAP FULL FACE MASK W/VALVE

## 2017-01-26 PROCEDURE — 25000003 PHARM REV CODE 250

## 2017-01-26 PROCEDURE — 94640 AIRWAY INHALATION TREATMENT: CPT

## 2017-01-26 PROCEDURE — 94761 N-INVAS EAR/PLS OXIMETRY MLT: CPT

## 2017-01-26 PROCEDURE — C9113 INJ PANTOPRAZOLE SODIUM, VIA: HCPCS | Performed by: STUDENT IN AN ORGANIZED HEALTH CARE EDUCATION/TRAINING PROGRAM

## 2017-01-26 PROCEDURE — 25000003 PHARM REV CODE 250: Performed by: STUDENT IN AN ORGANIZED HEALTH CARE EDUCATION/TRAINING PROGRAM

## 2017-01-26 PROCEDURE — 94003 VENT MGMT INPAT SUBQ DAY: CPT

## 2017-01-26 PROCEDURE — 20000000 HC ICU ROOM

## 2017-01-26 PROCEDURE — 25000003 PHARM REV CODE 250: Performed by: INTERNAL MEDICINE

## 2017-01-26 PROCEDURE — 80202 ASSAY OF VANCOMYCIN: CPT

## 2017-01-26 PROCEDURE — 80053 COMPREHEN METABOLIC PANEL: CPT

## 2017-01-26 PROCEDURE — 63600175 PHARM REV CODE 636 W HCPCS: Performed by: INTERNAL MEDICINE

## 2017-01-26 PROCEDURE — 25000242 PHARM REV CODE 250 ALT 637 W/ HCPCS: Performed by: STUDENT IN AN ORGANIZED HEALTH CARE EDUCATION/TRAINING PROGRAM

## 2017-01-26 PROCEDURE — 27000221 HC OXYGEN, UP TO 24 HOURS

## 2017-01-26 PROCEDURE — 63600175 PHARM REV CODE 636 W HCPCS: Performed by: HOSPITALIST

## 2017-01-26 PROCEDURE — 80069 RENAL FUNCTION PANEL: CPT | Mod: 91

## 2017-01-26 PROCEDURE — 82803 BLOOD GASES ANY COMBINATION: CPT

## 2017-01-26 PROCEDURE — 80069 RENAL FUNCTION PANEL: CPT

## 2017-01-26 PROCEDURE — 94660 CPAP INITIATION&MGMT: CPT

## 2017-01-26 PROCEDURE — 37799 UNLISTED PX VASCULAR SURGERY: CPT

## 2017-01-26 PROCEDURE — 85007 BL SMEAR W/DIFF WBC COUNT: CPT

## 2017-01-26 PROCEDURE — 63600175 PHARM REV CODE 636 W HCPCS: Performed by: STUDENT IN AN ORGANIZED HEALTH CARE EDUCATION/TRAINING PROGRAM

## 2017-01-26 PROCEDURE — 83735 ASSAY OF MAGNESIUM: CPT

## 2017-01-26 PROCEDURE — 85027 COMPLETE CBC AUTOMATED: CPT

## 2017-01-26 RX ORDER — PROPOFOL 10 MG/ML
5 INJECTION, EMULSION INTRAVENOUS CONTINUOUS
Status: DISCONTINUED | OUTPATIENT
Start: 2017-01-26 | End: 2017-01-27

## 2017-01-26 RX ADMIN — PANTOPRAZOLE SODIUM 40 MG: 40 INJECTION, POWDER, FOR SOLUTION INTRAVENOUS at 09:01

## 2017-01-26 RX ADMIN — PROPOFOL 35 MCG/KG/MIN: 10 INJECTION, EMULSION INTRAVENOUS at 05:01

## 2017-01-26 RX ADMIN — IPRATROPIUM BROMIDE AND ALBUTEROL SULFATE 3 ML: .5; 3 SOLUTION RESPIRATORY (INHALATION) at 03:01

## 2017-01-26 RX ADMIN — PROPOFOL 35 MCG/KG/MIN: 10 INJECTION, EMULSION INTRAVENOUS at 02:01

## 2017-01-26 RX ADMIN — IPRATROPIUM BROMIDE AND ALBUTEROL SULFATE 3 ML: .5; 3 SOLUTION RESPIRATORY (INHALATION) at 08:01

## 2017-01-26 RX ADMIN — DONEPEZIL HYDROCHLORIDE 5 MG: 5 TABLET ORAL at 08:01

## 2017-01-26 RX ADMIN — IPRATROPIUM BROMIDE AND ALBUTEROL SULFATE 3 ML: .5; 3 SOLUTION RESPIRATORY (INHALATION) at 10:01

## 2017-01-26 RX ADMIN — PIPERACILLIN SODIUM AND TAZOBACTAM SODIUM 4.5 G: 4; .5 INJECTION, POWDER, FOR SOLUTION INTRAVENOUS at 04:01

## 2017-01-26 RX ADMIN — VANCOMYCIN HYDROCHLORIDE 1500 MG: 100 INJECTION, POWDER, LYOPHILIZED, FOR SOLUTION INTRAVENOUS at 10:01

## 2017-01-26 RX ADMIN — HEPARIN SODIUM 5000 UNITS: 5000 INJECTION, SOLUTION INTRAVENOUS; SUBCUTANEOUS at 08:01

## 2017-01-26 RX ADMIN — METHYLPREDNISOLONE SODIUM SUCCINATE 40 MG: 40 INJECTION, POWDER, FOR SOLUTION INTRAMUSCULAR; INTRAVENOUS at 09:01

## 2017-01-26 RX ADMIN — CHLORHEXIDINE GLUCONATE 15 ML: 1.2 RINSE ORAL at 08:01

## 2017-01-26 RX ADMIN — IPRATROPIUM BROMIDE AND ALBUTEROL SULFATE 3 ML: .5; 3 SOLUTION RESPIRATORY (INHALATION) at 12:01

## 2017-01-26 RX ADMIN — FENTANYL CITRATE 10 MCG/HR: 50 INJECTION, SOLUTION INTRAMUSCULAR; INTRAVENOUS at 03:01

## 2017-01-26 RX ADMIN — CHLORHEXIDINE GLUCONATE 15 ML: 1.2 RINSE ORAL at 09:01

## 2017-01-26 RX ADMIN — HEPARIN SODIUM 5000 UNITS: 5000 INJECTION, SOLUTION INTRAVENOUS; SUBCUTANEOUS at 09:01

## 2017-01-26 RX ADMIN — IPRATROPIUM BROMIDE AND ALBUTEROL SULFATE 3 ML: .5; 3 SOLUTION RESPIRATORY (INHALATION) at 04:01

## 2017-01-26 RX ADMIN — IPRATROPIUM BROMIDE AND ALBUTEROL SULFATE 3 ML: .5; 3 SOLUTION RESPIRATORY (INHALATION) at 01:01

## 2017-01-26 RX ADMIN — ATORVASTATIN CALCIUM 80 MG: 40 TABLET, FILM COATED ORAL at 09:01

## 2017-01-26 RX ADMIN — CIPROFLOXACIN 400 MG: 2 INJECTION, SOLUTION INTRAVENOUS at 02:01

## 2017-01-26 NOTE — PLAN OF CARE
01/26/17 1359   Discharge Assessment   Assessment Type Discharge Planning Reassessment   Confirmed/corrected address and phone number on facesheet? Yes   Assessment information obtained from? Patient;Caregiver   Communicated expected length of stay with patient/caregiver yes   Prior to hospitilization cognitive status: Alert/Oriented   Prior to hospitalization functional status: Independent   Current cognitive status: Alert/Oriented   Current Functional Status: Needs Assistance   Arrived From [facility] hospital  (transfer from [facility])   Lives With sibling(s)   Able to Return to Prior Arrangements yes   Is patient able to care for self after discharge? Unable to determine at this time (comments)   How many people do you have in your home that can help with your care after discharge? 2   Who are your caregiver(s) and their phone number(s)? Zara (sister lives with  ) [phone]   Patient's perception of discharge disposition home health   Patient currently being followed by outpatient case management? No   Patient currently receives home health services? No   Does the patient currently use HME? Yes   Equipment Currently Used at Home CPAP;oxygen   Do you have any problems affording any of your prescribed medications? No   Is the patient taking medications as prescribed? yes   Do you have any financial concerns preventing you from receiving the healthcare you need? No   Does the patient have transportation to healthcare appointments? Yes   Transportation Available family or friend will provide   On Dialysis? No   Does the patient receive services at the [facility]madin Clinic? No   Are there any open cases? No   Discharge Plan A Home Health   Discharge Plan B Skilled Nursing Facility   Patient/Family In Agreement With Plan yes

## 2017-01-26 NOTE — CONSULTS
I have seen this patient once in the office in November.  75 YO WF Obese 350 lbs, HT, DM, Depression, and CKD.  Used to live in [place] and seen by Dr Austin, Nephrologist in 2006.  More recently followed by nephrologist in [place].  Referred because of her lab and swelling and fluid retention.  Lab done twice in October 2016 showed creatinine 2.2 and 1.8.  Seemed to have diabetic kidney disease, CKD 4  Advised push for optimal DM control, strict diet and sent for a W/U and to return in Jan. 2017  She did have lab on 12/27 which showed a creatinine of 1.5, microalbumin of 600, no P/C ratio could be calculated.  Patient unable to give any history now, she is intubated, her lab reported a creatinine of 12 and a high K  She has just had a dialysis catheter inserted, has a low BP and is on pressors.  Cause of the acute worsening of renal function not clear to me at present.  Can not tolerate regular dialysis and I am told that we are out of CRRT equipment this morning, will get additional machine this afternoon.  considering the urgent need to lower her K and start correcting her acidosis and uremia, I decided to move the machine being used by another patient who excellent lab and is well dialysed , so we can start slow hemodialysis on Mrs Jessica.  Spoke to dialysis staff and she will be started soon.

## 2017-01-26 NOTE — PLAN OF CARE
Problem: Patient Care Overview  Goal: Plan of Care Review  Outcome: Ongoing (interventions implemented as appropriate)  ABCDEF BUNDLE           A- PT ON VENT  UU=212   FIO2=50 PEEP=5  B- NO SBT OR SAT DONE TODAY   C-PROPFOL  CONTINUOUS GIVEN FOR SEDATION PT DROWSY  D- PT ON VENT DISORIENTED  E- PT ON BEDREST  F-MULTIPLE FAMILY HAVE COME BY TO VISIT PT @ BED SIDE

## 2017-01-26 NOTE — PROGRESS NOTES
[facility] IM Resident HO-3 Progress Note    Subjective:      Adela Jessica is a 76 y.o.  female who is being followed by the [facility] IM service at [facility] for shock. The patient was placed on CRRT yesterday afternoon.  The patient has had no acute events overnight.  The patient remains intubated     Objective:   Last 24 Hour Vital Signs:  BP  Min: 83/44  Max: 153/65  Temp  Av.1 °F (36.2 °C)  Min: 96 °F (35.6 °C)  Max: 97.7 °F (36.5 °C)  Pulse  Av.2  Min: 61  Max: 93  Resp  Av.2  Min: 20  Max: 89  SpO2  Av.7 %  Min: 81 %  Max: 100 %  Weight  Av.5 kg (345 lb 0.3 oz)  Min: 156.5 kg (345 lb 0.3 oz)  Max: 156.5 kg (345 lb 0.3 oz)  I/O last 3 completed shifts:  In: 1590.3 [I.V.:990.3; IV Piggyback:600]  Out: 3019 [Urine:2015; Drains:850; Other:154]    Physical Examination:  Gen: Patient intubated, on bear warmer  HEENT: PERRLA, ETT in place  Neck: Unable to appreicated jvp 2/2 patient's body habitus  CV: Distant heart sounds, unable to appreciate murmurs  Resp: End exp wheezes heard throughout, decreased air movement at bases bilaterally  Abd: Full, soft, non tender, non distended, old scar +BS  Ext: 2+ pitting edema, dry flaky skin   Skin: Dry, flaky, peeling over lower extremities  Neuro: awake. Alert, following commands    Laboratory:  Laboratory Data Reviewed: yes  Pertinent Findings:      Microbiology Data Reviewed: yes  Pertinent Findings:  Recent Labs      17   0400  17   1330  17   0556   WBC  15.91*   --   23.25*   HGB  10.3*   --   11.2*   HCT  33.5*  34*  34.9*   PLT  433*   --   419*   MCV  96   --   93   RDW  14.8*   --   14.8*   GRAN  89.0*   --   89.0*   LYMPH  7.0*  CANCELED   --   6.0*  CANCELED   MONO  1.0*  CANCELED   --   0.0*  CANCELED   EOS  CANCELED   --   CANCELED   BASO  CANCELED   --   CANCELED   EOSINOPHIL  0.0   --   0.0   BASOPHIL  0.0   --   0.0     Recent Labs      17   0400  17   0812  17   1542  17   1018   01/26/17   0556   NA  129*  127*  130*  130*  132*  132*   K  6.6*  6.0*  5.0  4.9  4.8  4.8   CL  88*  86*  89*  91*  94*  94*   CO2  23  22*  23  24  25  25   BUN  105*  105*  116*  110*  78*  78*   CREATININE  12.4*  11.9*  11.1*  10.2*  6.8*  6.8*     Recent Labs      01/26/17   0322  01/26/17   0405  01/26/17   0525  01/26/17   0613  01/26/17   0651   POCTGLUCOSE  106  177*  184*  173*  201*     Recent Labs      01/25/17   0400  01/25/17   1542  01/26/17   0556   PROT  6.2   --   7.2   ALBUMIN  2.1*  2.0*  2.2*  2.2*   BILITOT  0.4   --   0.3   AST  11   --   12   ALT  13   --   13   ALKPHOS  119   --   113         Other Results:  EKG (my interpretation): NSR, nonspecific IV block    Radiology Data Reviewed: yes  Pertinent Findings:  cxr with minimal changes    Current Medications:     Infusions:   dexmedetomidine (PRECEDEX) infusion Stopped (01/25/17 1149)    fentanyl 60 mcg/hr (01/26/17 0528)    insulin (HUMAN R) infusion (adults) 2.4 Units/hr (01/26/17 0700)    norepinephrine bitartrate-D5W 0.06 mcg/kg/min (01/26/17 0339)    propofol 35 mcg/kg/min (01/26/17 0537)        Scheduled:   albuterol-ipratropium 2.5mg-0.5mg/3mL  3 mL Nebulization Q4H    atorvastatin  80 mg Oral Daily    chlorhexidine  15 mL Mouth/Throat BID    ciprofloxacin  400 mg Intravenous Q24H    donepezil  5 mg Oral QHS    fluticasone-vilanterol  1 puff Inhalation Daily    heparin (porcine)  5,000 Units Subcutaneous Q12H    methylPREDNISolone sodium succinate  40 mg Intravenous Daily    pantoprazole  40 mg Intravenous Daily    pentoxifylline  400 mg Oral BID WM    piperacillin-tazobactam 4.5 g in dextrose 5 % 100 mL IVPB (ready to mix system)  4.5 g Intravenous Q12H        PRN:  dextrose 50%, dextrose 50%, dextrose 50%, heparin (porcine), magnesium sulfate IVPB, sodium phosphate IVPB, sodium phosphate IVPB, sodium phosphate IVPB    Antibiotics and Day Number of Therapy:  Vanc, zosyn, cipro started 1/25/17    Lines and  "Day Number of Therapy:  Trialysis cath    Assessment:     Adela Jessica is a 76 y.o.female with  Patient Active Problem List    Diagnosis Date Noted    SUBHASH (acute kidney injury) 01/25/2017    Morbid obesity due to excess calories 01/25/2017    Encephalopathy acute 01/25/2017    Uremia 01/25/2017    Tinea pedis of both feet 01/25/2017    Normocytic anemia 01/25/2017    Severe sepsis with acute organ dysfunction 01/25/2017    Dementia 01/25/2017    Hyponatremia 01/25/2017    Shock 01/25/2017    Hyperkalemia 11/11/2013    CKD (chronic kidney disease) stage 4, GFR 15-29 ml/min 11/11/2013    Leukocytosis 11/11/2013    Type 2 diabetes mellitus with complication 11/11/2013    COPD (chronic obstructive pulmonary disease) 11/11/2013    HTN (hypertension) 11/11/2013    HLD (hyperlipidemia) 11/11/2013    Troponin I above reference range 11/11/2013    Acute on chronic respiratory failure with hypoxia and hypercapnia 11/10/2013        Plan:   76 yr old female w/ pmhx of ckd IV, DM, copd, HTN, HPL, HIWOT was transferred from OSH for "copd exacerbation and need for dialysis".     Neuro     Encephalopathy  - likely 2/2 metabolic causes such as hypoxia and hypercapneia could also be septic in nature vs cva vs toxidrome vs uremia vs dementia  -CT head ordered but not done due to patient's critical status  -patient was only minimally responsive but sedated  -patient placed on precedex, fentanyl, proprofol  -will consider MRI for cva workup when patient is stable     CV    Shock  -most likely from multiple etiologies septic vs cardiogenic vs hypovolemic  -patient was hypotensive on arrival with pressures only dopplerable  -fluids wide open and patient placed on levophed for which she remains  -continue to hold bp meds  -treating sepsis with broad spectrum antibiotics at this time  -ECHO with mildly depressed EF; limited study  -Cardiology was consulted and are signing off as they believe her heart is " fine     Intermediate troponin  -troponin was intermediate at OSH; patient's troponin leveled off at ~0.9; stopped trending  -ekg without signs of ischemia did involve cards because we believe the QRS had widened and were concerned about LBBB; cardiology has signed off as they have confirmed this is NOT LBBB  -echo ordered and results as above  -osh with cardiomegaly on cxr  -patient on Pentoxyphylline for PAD? Patient on statin will obtain lipid panel and restart statin when tolerating po     Resp    Acute Hypercapneic and hypoxic respiratory failure  -patient was non-responsive with abg of ~ 7.1/85  -patient was intubated shortly after the gas results  -cxr ordered   -improving acidosis and hypercapneia  -pulmonary consulted for help with the vent  -last gas 7.325/59/98/30    copd exacerbation   - scheduled nebs and steroids     FEN/GI    Hyponatremia And hyperkalemia  -patient s/p 1 L NS  -patient NPO  -patient was hypovolemic hyponatremia at 129; improved to 132  -patient was hyperkalemic at 5.4 at osh. Patient not shifted so insulin, d50, kayaxalate  -hyperkalemia initially worsened and was shifted several more times prior to CRRT initiated  -patient without ekg changes  -hyperkalemia resolved  -protonix for gi ppx      Heme/ID    SEPSIS possible septic shock  -patient meet sepsis criteria from OSH based on heart rate, respirations and wbc count  -patient advanced to severe sepsis on arrival; has so far been responsive to fluids; advanced to requiring pressors  -trilaysis cath placed  -patient on vanc, zosyn, cipro patient was pancultured  -no suspected source at this time; will follow cultures; lactate negative  -so far blood ngtd, urine pending and sputum with few wbcs and few gram positive cocci    AOCD  -new studies ordered and with low-normal iron and elevated ferritin, will most likely need HD and will likely be candidate for venofer and epo w/ hd    AGMA  -however patient probably chronic co2 retainer so  this is probably acidotic for patient  -lactate negative likely 2/2 renal failure  -improving bicarb however AG still elevated; will likely improve with contiuation of crrt    Thrombocytosis  -likely reactive to infectious process  -will continue to monitor     nephro    Acute Renal Failure with hyperkalemia  -trialysis catheter placed; patient started on crrt  -patient is SUBHASH on CKDIV likely advanced to CKDV  -renal on board; appreicate recs  -hyperkalemia treated as above and resolved after addition of crrt  -FENA 11.5%     Endocrine    Diabetes with neuropathy  -A1c 7.5  -patient placed on insulin gtt with q 1 blood sugars goal 140-180  -patient on metformin and glipizide +/- insulin at home will hold while hospitalized  -will continue lyrica for neuropathy as tolerates po     Psych    HIstory of dementia and depression  -patient with history of dementia on donepezil; will hold  -patient on cumbalta, lyrica; will hold     Ppx: Hep and protonix     Dispo: Patient remains critically ill.      [place] Blue  [facility] Internal Medicine HO-3  [facility] IM Service Team A    [facility] Medicine Hospitalist Pager numbers:   [facility] Hospitalist Medicine Team A (Izabela/Brisa): [phone]  [facility] Hospitalist Medicine Team B (Bart/Benjamin):  [phone]

## 2017-01-26 NOTE — PROGRESS NOTES
Vancomycin Pharmacokinetics Monitoring Protocol  Indication: Sepsis/bacteremia     Ht: 63 in, Wt 156.5 kg (adjBW 93.84 kg), Scr 6.8, eCrCl ~ 10 ml/min, WBC 23.25, Temp 98.2     Target trough level : 15-20 mcg/ml     Current antibiotics: vancomycin 1500mg IV q24, zosyn 4.5g IV q12h, ciprofloxacin 400mg IV q24h     Vanc random level = 9.6, patient is currently on CRRT     Based on her pharmacokinetics, plan to continue vancomycin 1500mg IV q24h (15mg/kg) with another random drawn tomorrow @ 0800. Pharmacy will continue to follow.

## 2017-01-26 NOTE — PROGRESS NOTES
[facility] Pulmonology/Critical Care Resident HO-II Progress Note    Subjective:      Remains intubated, follows commands    CVVH started yesterday     Objective:   Last 24 Hour Vital Signs:  BP  Min: 83/44  Max: 153/65  Temp  Av.1 °F (36.2 °C)  Min: 96 °F (35.6 °C)  Max: 97.7 °F (36.5 °C)  Pulse  Av  Min: 61  Max: 92  Resp  Av.2  Min: 20  Max: 89  SpO2  Av.7 %  Min: 81 %  Max: 100 %  Weight  Av.5 kg (345 lb 0.3 oz)  Min: 156.5 kg (345 lb 0.3 oz)  Max: 156.5 kg (345 lb 0.3 oz)  I/O last 3 completed shifts:  In: 1590.3 [I.V.:990.3; IV Piggyback:600]  Out: 2981 [Urine:2015; Drains:850; Other:116]    Physical Examination:  General: NAD, intubated and sedated, morbidly obese  HEENT: EOMI, PERRL, MMM, oropharynx clear, ET tube in place  Respiratory: Clear to auscultation bilaterally, no w/r/r/. Good respiratory effort, no use of accessory muscles   Chest wall: No deformities, non-tender  Cardiovascular: Distant heart sounds, regular rhythm, normal rate. No m/r/g. Normal S1 and S2. Radial pulses 2+ bilaterally and synchronous. Capillary refill <3 seconds  Abdominal: soft, NT/ND, normoactive bowel sounds  Extremities: No c/c. 2+ pitting edema from ankle to knee   Neuro: sedated, follows commands  Skin: warm and dry. No rashes or lesions. Dry flaking skin of LEs    Laboratory:  Laboratory Data Reviewed: yes  Pertinent Findings:    Recent Labs  Lab 17  0400  17  1330 17  1542 17  1647 17  0556   WBC 15.91*  --   --   --   --  23.25*   HGB 10.3*  --   --   --   --  11.2*   HCT 33.5*  --  34*  --   --  34.9*   *  --   --   --   --  419*   MCV 96  --   --   --   --  93   RDW 14.8*  --   --   --   --  14.8*   *  < >  --  130* 130* 132*  132*   K 6.6*  < >  --  5.0 4.9 4.8  4.8   CL 88*  < >  --  89* 91* 94*  94*   CO2 23  < >  --  23 24 25  25   *  < >  --  116* 110* 78*  78*   CREATININE 12.4*  < >  --  11.1* 10.2* 6.8*  6.8*   *  < >  --  307*  194* 173*  173*   PROT 6.2  --   --   --   --  7.2   ALBUMIN 2.1*  --   --  2.0*  --  2.2*  2.2*   BILITOT 0.4  --   --   --   --  0.3   AST 11  --   --   --   --  12   ALKPHOS 119  --   --   --   --  113   ALT 13  --   --   --   --  13   < > = values in this interval not displayed.    Microbiology Data Reviewed: yes  Pertinent Findings:  BCx - NGTD    Current Medications:     Infusions:   dexmedetomidine (PRECEDEX) infusion Stopped (01/25/17 1149)    fentanyl 60 mcg/hr (01/26/17 0528)    insulin (HUMAN R) infusion (adults) 2.4 Units/hr (01/26/17 0700)    norepinephrine bitartrate-D5W 0.06 mcg/kg/min (01/26/17 0339)    propofol 35 mcg/kg/min (01/26/17 0537)        Scheduled:   albuterol-ipratropium 2.5mg-0.5mg/3mL  3 mL Nebulization Q4H    atorvastatin  80 mg Oral Daily    chlorhexidine  15 mL Mouth/Throat BID    ciprofloxacin  400 mg Intravenous Q24H    donepezil  5 mg Oral QHS    fluticasone-vilanterol  1 puff Inhalation Daily    heparin (porcine)  5,000 Units Subcutaneous Q12H    methylPREDNISolone sodium succinate  40 mg Intravenous Daily    pantoprazole  40 mg Intravenous Daily    pentoxifylline  400 mg Oral BID WM    piperacillin-tazobactam 4.5 g in dextrose 5 % 100 mL IVPB (ready to mix system)  4.5 g Intravenous Q12H        PRN:  dextrose 50%, dextrose 50%, dextrose 50%, heparin (porcine), magnesium sulfate IVPB, sodium phosphate IVPB, sodium phosphate IVPB, sodium phosphate IVPB    Assessment:     Adela Jessica is a 76 y.o.female with T2DM, COPD and CKD IV who presented with acute hypoxemic/hypercapeic respiratory failure    Plan:     Neuro/CNS:   - Turned off sedation this AM for SBT  - Following commands    Cardiovascular:   Shock - cardiogenic vs septic vs hypovolemic  - Remains on levophed for BP support  - Agree with holding home BP meds  - Agree with empiric abx    Intermediate troponin  - Peaked at 0.09   - Cardiology evaluated, feel she is fine from cardiac standpoint. No plans for  invasive management  - Medical management per primary team    Respiratory:   Acute on chronic hypoxemic/hypercapneic respiratory failure   - Presented with ABG demonstrating respiratory acidosis, was obtunded and so she was intubated for respiratory support  - ABG showing improvement this AM  - Placed on SBT, will  Check gas 30 min later and possibly extubate this AM    COPD exacerbation  - Continue scheduled DuoNebs, abx and steroids for treatment    Gastrointestinal / Fluids, Electrolytes, and Nutrition:   Hyponatremia   - Sodium 132 this AM  - From from volume depletion, continue management per primary team    Hyperkalemia (resolved)  - Shifted on admission, now corrected with CVVH  - Continue to monitor labs    Renal:   Acute on chronic kidney disease stage IV  - Has likely progressed to CKD V  - Nephrology following, currently on CVVH    Anion gap metabolic acidosis  - Improving with CVVH    Infectious Disease:   Possible septic shock  - Still requiring BP support  - Culture data negative thus far  - Procal elevated at 0.44 in CKD patient  - Agree with broad spectrum abx given severity of initial presentation     Hematology/Oncology:   Anemia of chronic inflammation  - H/H stable  - Continue daily monitoring    Endocrine:   Type 2 diabetes mellitus, uncontrolled, with neuropathy  - A1c 7.5%  - Agree with insulin drip for goal glucose of 140-180 while in the ICU  - Further management per primary team    Psychiatric:   History of dementia and depression  - Management per primary team    Prophylaxis:   GI - Protonix  DVT - SQH    Alma Hussein  [facility] Internal Medicine HO-II  [facility] Critical Care/Pulmonology Service

## 2017-01-26 NOTE — PLAN OF CARE
Problem: Patient Care Overview  Goal: Plan of Care Review  Outcome: Ongoing (interventions implemented as appropriate)  PT DISORIENTED TO PERSON, TIME, PLACE AND SITUATION, AFEBRILE TODAY, RESTRAINTS CONTINUOUS, NO SKIN BREAK DOWN, CRRT CVVHD, URINE OUT PUT TODAY  MLS TODAY SR ON THE MONITOR , LEVO CONTINUOUS. PROPFOL FOR SEDATION, ART-LINE PLACE TODAY FOR ARTERIAL PRESSURE MONITORING

## 2017-01-27 PROBLEM — J15.4 STREPTOCOCCAL PNEUMONIA: Status: ACTIVE | Noted: 2017-01-27

## 2017-01-27 PROBLEM — I50.1 PULMONARY EDEMA WITH CONGESTIVE HEART FAILURE: Status: ACTIVE | Noted: 2017-01-27

## 2017-01-27 LAB
ALBUMIN SERPL BCP-MCNC: 2.1 G/DL
ALBUMIN SERPL BCP-MCNC: 2.1 G/DL
ALP SERPL-CCNC: 94 U/L
ALT SERPL W/O P-5'-P-CCNC: 12 U/L
ANION GAP SERPL CALC-SCNC: 8 MMOL/L
ANION GAP SERPL CALC-SCNC: 8 MMOL/L
AST SERPL-CCNC: 12 U/L
BACTERIA SPEC AEROBE CULT: NORMAL
BASOPHILS # BLD AUTO: 0.01 K/UL
BASOPHILS NFR BLD: 0.1 %
BILIRUB SERPL-MCNC: 0.3 MG/DL
BUN SERPL-MCNC: 72 MG/DL
BUN SERPL-MCNC: 72 MG/DL
CALCIUM SERPL-MCNC: 8.9 MG/DL
CALCIUM SERPL-MCNC: 8.9 MG/DL
CHLORIDE SERPL-SCNC: 97 MMOL/L
CHLORIDE SERPL-SCNC: 97 MMOL/L
CO2 SERPL-SCNC: 31 MMOL/L
CO2 SERPL-SCNC: 31 MMOL/L
CREAT SERPL-MCNC: 5 MG/DL
CREAT SERPL-MCNC: 5 MG/DL
DIFFERENTIAL METHOD: ABNORMAL
EOSINOPHIL # BLD AUTO: 0 K/UL
EOSINOPHIL NFR BLD: 0 %
ERYTHROCYTE [DISTWIDTH] IN BLOOD BY AUTOMATED COUNT: 14.9 %
EST. GFR  (AFRICAN AMERICAN): 9 ML/MIN/1.73 M^2
EST. GFR  (AFRICAN AMERICAN): 9 ML/MIN/1.73 M^2
EST. GFR  (NON AFRICAN AMERICAN): 8 ML/MIN/1.73 M^2
EST. GFR  (NON AFRICAN AMERICAN): 8 ML/MIN/1.73 M^2
GLUCOSE SERPL-MCNC: 137 MG/DL
GLUCOSE SERPL-MCNC: 137 MG/DL
GRAM STN SPEC: NORMAL
HCT VFR BLD AUTO: 33 %
HEP. B SURF AB, QUAL: NEGATIVE
HEP. B SURF AB, QUANT.: <3 MIU/ML
HGB BLD-MCNC: 10.4 G/DL
LYMPHOCYTES # BLD AUTO: 2.3 K/UL
LYMPHOCYTES NFR BLD: 15.8 %
MAGNESIUM SERPL-MCNC: 1.4 MG/DL
MAGNESIUM SERPL-MCNC: 1.6 MG/DL
MCH RBC QN AUTO: 30.1 PG
MCHC RBC AUTO-ENTMCNC: 31.5 %
MCV RBC AUTO: 96 FL
MONOCYTES # BLD AUTO: 0.7 K/UL
MONOCYTES NFR BLD: 4.8 %
NEUTROPHILS # BLD AUTO: 11.4 K/UL
NEUTROPHILS NFR BLD: 78.9 %
PHOSPHATE SERPL-MCNC: 5.5 MG/DL
PLATELET # BLD AUTO: 337 K/UL
PMV BLD AUTO: 9 FL
POCT GLUCOSE: 117 MG/DL (ref 70–110)
POCT GLUCOSE: 133 MG/DL (ref 70–110)
POCT GLUCOSE: 144 MG/DL (ref 70–110)
POCT GLUCOSE: 251 MG/DL (ref 70–110)
POCT GLUCOSE: 86 MG/DL (ref 70–110)
POTASSIUM SERPL-SCNC: 4.4 MMOL/L
POTASSIUM SERPL-SCNC: 4.4 MMOL/L
PROT SERPL-MCNC: 7.1 G/DL
RBC # BLD AUTO: 3.45 M/UL
SODIUM SERPL-SCNC: 136 MMOL/L
SODIUM SERPL-SCNC: 136 MMOL/L
VANCOMYCIN SERPL-MCNC: 7.4 UG/ML
WBC # BLD AUTO: 14.38 K/UL

## 2017-01-27 PROCEDURE — G8979 MOBILITY GOAL STATUS: HCPCS | Mod: CI

## 2017-01-27 PROCEDURE — 97162 PT EVAL MOD COMPLEX 30 MIN: CPT

## 2017-01-27 PROCEDURE — 25000003 PHARM REV CODE 250

## 2017-01-27 PROCEDURE — 63600175 PHARM REV CODE 636 W HCPCS: Performed by: STUDENT IN AN ORGANIZED HEALTH CARE EDUCATION/TRAINING PROGRAM

## 2017-01-27 PROCEDURE — C9113 INJ PANTOPRAZOLE SODIUM, VIA: HCPCS | Performed by: STUDENT IN AN ORGANIZED HEALTH CARE EDUCATION/TRAINING PROGRAM

## 2017-01-27 PROCEDURE — 94640 AIRWAY INHALATION TREATMENT: CPT

## 2017-01-27 PROCEDURE — 94660 CPAP INITIATION&MGMT: CPT

## 2017-01-27 PROCEDURE — 85025 COMPLETE CBC W/AUTO DIFF WBC: CPT

## 2017-01-27 PROCEDURE — G8978 MOBILITY CURRENT STATUS: HCPCS | Mod: CL

## 2017-01-27 PROCEDURE — 25000003 PHARM REV CODE 250: Performed by: INTERNAL MEDICINE

## 2017-01-27 PROCEDURE — 20000000 HC ICU ROOM

## 2017-01-27 PROCEDURE — 63600175 PHARM REV CODE 636 W HCPCS: Performed by: INTERNAL MEDICINE

## 2017-01-27 PROCEDURE — G8996 SWALLOW CURRENT STATUS: HCPCS | Mod: CJ

## 2017-01-27 PROCEDURE — G8997 SWALLOW GOAL STATUS: HCPCS | Mod: CI

## 2017-01-27 PROCEDURE — 80069 RENAL FUNCTION PANEL: CPT

## 2017-01-27 PROCEDURE — 83735 ASSAY OF MAGNESIUM: CPT | Mod: 91

## 2017-01-27 PROCEDURE — 25000242 PHARM REV CODE 250 ALT 637 W/ HCPCS: Performed by: STUDENT IN AN ORGANIZED HEALTH CARE EDUCATION/TRAINING PROGRAM

## 2017-01-27 PROCEDURE — 92610 EVALUATE SWALLOWING FUNCTION: CPT

## 2017-01-27 PROCEDURE — 80053 COMPREHEN METABOLIC PANEL: CPT

## 2017-01-27 PROCEDURE — 25000003 PHARM REV CODE 250: Performed by: STUDENT IN AN ORGANIZED HEALTH CARE EDUCATION/TRAINING PROGRAM

## 2017-01-27 PROCEDURE — 80202 ASSAY OF VANCOMYCIN: CPT

## 2017-01-27 PROCEDURE — 83735 ASSAY OF MAGNESIUM: CPT

## 2017-01-27 RX ORDER — IBUPROFEN 200 MG
24 TABLET ORAL
Status: DISCONTINUED | OUTPATIENT
Start: 2017-01-27 | End: 2017-01-30 | Stop reason: HOSPADM

## 2017-01-27 RX ORDER — INSULIN ASPART 100 [IU]/ML
1-10 INJECTION, SOLUTION INTRAVENOUS; SUBCUTANEOUS
Status: DISCONTINUED | OUTPATIENT
Start: 2017-01-27 | End: 2017-01-30 | Stop reason: HOSPADM

## 2017-01-27 RX ORDER — MAGNESIUM SULFATE HEPTAHYDRATE 40 MG/ML
2 INJECTION, SOLUTION INTRAVENOUS ONCE
Status: COMPLETED | OUTPATIENT
Start: 2017-01-27 | End: 2017-01-27

## 2017-01-27 RX ORDER — GLUCAGON 1 MG
1 KIT INJECTION
Status: DISCONTINUED | OUTPATIENT
Start: 2017-01-27 | End: 2017-01-30 | Stop reason: HOSPADM

## 2017-01-27 RX ORDER — IBUPROFEN 200 MG
16 TABLET ORAL
Status: DISCONTINUED | OUTPATIENT
Start: 2017-01-27 | End: 2017-01-30 | Stop reason: HOSPADM

## 2017-01-27 RX ORDER — MOXIFLOXACIN HYDROCHLORIDE 400 MG/250ML
400 INJECTION, SOLUTION INTRAVENOUS
Status: DISCONTINUED | OUTPATIENT
Start: 2017-01-27 | End: 2017-01-30 | Stop reason: HOSPADM

## 2017-01-27 RX ADMIN — MAGNESIUM SULFATE IN WATER 2 G: 40 INJECTION, SOLUTION INTRAVENOUS at 12:01

## 2017-01-27 RX ADMIN — ATORVASTATIN CALCIUM 80 MG: 40 TABLET, FILM COATED ORAL at 09:01

## 2017-01-27 RX ADMIN — CHLORHEXIDINE GLUCONATE 15 ML: 1.2 RINSE ORAL at 09:01

## 2017-01-27 RX ADMIN — PANTOPRAZOLE SODIUM 40 MG: 40 INJECTION, POWDER, FOR SOLUTION INTRAVENOUS at 09:01

## 2017-01-27 RX ADMIN — INSULIN ASPART 3 UNITS: 100 INJECTION, SOLUTION INTRAVENOUS; SUBCUTANEOUS at 09:01

## 2017-01-27 RX ADMIN — IPRATROPIUM BROMIDE AND ALBUTEROL SULFATE 3 ML: .5; 3 SOLUTION RESPIRATORY (INHALATION) at 11:01

## 2017-01-27 RX ADMIN — FLUTICASONE FUROATE AND VILANTEROL TRIFENATATE 1 PUFF: 100; 25 POWDER RESPIRATORY (INHALATION) at 07:01

## 2017-01-27 RX ADMIN — HEPARIN SODIUM 5000 UNITS: 5000 INJECTION, SOLUTION INTRAVENOUS; SUBCUTANEOUS at 09:01

## 2017-01-27 RX ADMIN — PENTOXIFYLLINE 400 MG: 400 TABLET, FILM COATED, EXTENDED RELEASE ORAL at 09:01

## 2017-01-27 RX ADMIN — IPRATROPIUM BROMIDE AND ALBUTEROL SULFATE 3 ML: .5; 3 SOLUTION RESPIRATORY (INHALATION) at 08:01

## 2017-01-27 RX ADMIN — CIPROFLOXACIN 400 MG: 2 INJECTION, SOLUTION INTRAVENOUS at 03:01

## 2017-01-27 RX ADMIN — MOXIFLOXACIN HYDROCHLORIDE 400 MG: 400 INJECTION, SOLUTION INTRAVENOUS at 11:01

## 2017-01-27 RX ADMIN — IPRATROPIUM BROMIDE AND ALBUTEROL SULFATE 3 ML: .5; 3 SOLUTION RESPIRATORY (INHALATION) at 03:01

## 2017-01-27 RX ADMIN — PIPERACILLIN SODIUM AND TAZOBACTAM SODIUM 4.5 G: 4; .5 INJECTION, POWDER, FOR SOLUTION INTRAVENOUS at 05:01

## 2017-01-27 RX ADMIN — PENTOXIFYLLINE 400 MG: 400 TABLET, FILM COATED, EXTENDED RELEASE ORAL at 05:01

## 2017-01-27 RX ADMIN — IPRATROPIUM BROMIDE AND ALBUTEROL SULFATE 3 ML: .5; 3 SOLUTION RESPIRATORY (INHALATION) at 07:01

## 2017-01-27 RX ADMIN — VANCOMYCIN HYDROCHLORIDE 1750 MG: 1 INJECTION, POWDER, LYOPHILIZED, FOR SOLUTION INTRAVENOUS at 09:01

## 2017-01-27 RX ADMIN — IPRATROPIUM BROMIDE AND ALBUTEROL SULFATE 3 ML: .5; 3 SOLUTION RESPIRATORY (INHALATION) at 05:01

## 2017-01-27 RX ADMIN — METHYLPREDNISOLONE SODIUM SUCCINATE 40 MG: 40 INJECTION, POWDER, FOR SOLUTION INTRAMUSCULAR; INTRAVENOUS at 09:01

## 2017-01-27 RX ADMIN — IPRATROPIUM BROMIDE AND ALBUTEROL SULFATE 3 ML: .5; 3 SOLUTION RESPIRATORY (INHALATION) at 12:01

## 2017-01-27 RX ADMIN — DONEPEZIL HYDROCHLORIDE 5 MG: 5 TABLET ORAL at 09:01

## 2017-01-27 RX ADMIN — NOREPINEPHRINE BITARTRATE 0.02 MCG/KG/MIN: 1 INJECTION, SOLUTION, CONCENTRATE INTRAVENOUS at 09:01

## 2017-01-27 NOTE — PROGRESS NOTES
Much better.  Awake, recognized me and answers simple questions.  Lab better except for WBC count which is higher.  Had 1700 cc urine output last 24 hrs.  Slow dialysis in progress and her creatinine is half what it was yesterday.  Will continue CRRt for now but can probably stop tomorrow and see how she does without.  No additional history available about present illness.

## 2017-01-27 NOTE — PLAN OF CARE
Problem: Patient Care Overview  Goal: Individualization & Mutuality  Outcome: Ongoing (interventions implemented as appropriate)  ABCDEF: Extubated today. Doing well on NC. Weaned off pressors and insulin drip. GOMES. CAM score negative. Remains on bedrest. Family @ bedside and updated on plan of care.

## 2017-01-27 NOTE — PT/OT/SLP EVAL
Physical Therapy  Evaluation    Adela Jessica   MRN: [id number]   Admitting Diagnosis: Shock    PT Received On: 01/27/17  PT Start Time: 1344     PT Stop Time: 1407    PT Total Time (min): 23 min       Billable Minutes:  Evaluation 23    Diagnosis: Shock      Past Medical History   Diagnosis Date    Arthritis     CKD (chronic kidney disease)     Dementia     Depression     DM2 (diabetes mellitus, type 2)     GERD (gastroesophageal reflux disease)     HLD (hyperlipidemia)     HTN (hypertension)     HIWOT on CPAP       Past Surgical History   Procedure Laterality Date    Hysterectomy         Referring physician: Dr. Blue  Date referred to PT: 1/27/17    General Precautions: Standard, aspiration, fall, respiratory  Orthopedic Precautions: N/A   Braces:         Do you have any cultural, spiritual, Catholic conflicts, given your current situation?: no    Patient History:  Lives With: sibling(s), other relative(s)  Living Arrangements: mobile home  Home Accessibility: stairs to enter home  Number of Stairs to Enter Home: 4  Stair Railings at Home: outside, present at both sides  Transportation Available: family or friend will provide  Living Environment Comment: pt lives with sister (who uses RW) and niece in mobile home with 4 ENMANUEL and B HR.  Niece works and pt and sister are home alone during day.  pt is mod I for amb without AD for household and limted community.  she has O2 with nc during day andCpap at night.  bathroom has tub shower with TTB.  pt reports mod I with ADL.  niece does driving and shopping.  niece and sister do cooking.  pt is able to get up and down steps   Equipment Currently Used at Home: bath bench, CPAP, respiratory supplies, oxygen  DME owned (not currently used): none    Previous Level of Function:  Ambulation Skills: independent  Transfer Skills: independent  ADL Skills: needs device    Subjective:  Communicated with nursing prior to session.    Chief Complaint: SOB  Patient goals: go home  and PLOF    Pain Ratin/10               Pain Rating Post-Intervention: 0/10    Objective:   Patient found with: arterial line, central line, jones catheter, peripheral IV, BIPAP, oxygen     Cognitive Exam:  Oriented to: Person, Place and Time    Follows Commands/attention: Follows one-step commands-pt on Bipap and have to speak loudly next to her  Communication: shakes head yes and no mostly 2* Bipap  Safety awareness/insight to disability: impaired    Physical Exam:  Postural examination/scapula alignment: Rounded shoulder, Head forward and morbidly obese    Skin integrity: Visible skin intact  Edema: Mild BLE    Sensation:   Intact in B LE     Lower Extremity Range of Motion:  BLE limited from WNL 2* morbidly obese    Lower Extremity Strength:  Right Lower Extremity: Deficits: grossly in 3/3+ range   Left Lower Extremity: Deficits: grossly in 3+/4- range     Gross motor coordination: slowed 2* size    Functional Mobility:  Bed Mobility:  Supine to Sit: Moderate Assistance  Sit to Supine: Minimum Assistance    Transfers:  Sit <> Stand Assistance: Activity did not occur    Gait:   Gait Distance: did not assess    Stairs:  Not assessed     Balance:   Static Sit: FAIR+: Able to take MINIMAL challenges from all directions  Dynamic Sit: FAIR+: Maintains balance through MINIMAL excursions of active trunk motion  Static Stand: n/a  Dynamic stand: n/a    Therapeutic Activities and Exercises:  Pt eval.  Comes to EOB and despite being on Bipap sats drop to 85% and diastolic BP over 100 so pt returned to supine.  sats back to mid 90's      AM-PAC 6 CLICK MOBILITY  How much help from another person does this patient currently need?   1 = Unable, Total/Dependent Assistance  2 = A lot, Maximum/Moderate Assistance  3 = A little, Minimum/Contact Guard/Supervision  4 = None, Modified [place]/Independent    Turning over in bed (including adjusting bedclothes, sheets and blankets)?: 3  Sitting down on and standing up from a  chair with arms (e.g., wheelchair, bedside commode, etc.): 2  Moving from lying on back to sitting on the side of the bed?: 2  Moving to and from a bed to a chair (including a wheelchair)?: 2  Need to walk in hospital room?: 1  Climbing 3-5 steps with a railing?: 1  Total Score: 11     AM-PAC Raw Score CMS G-Code Modifier Level of Impairment Assistance   6 % Total / Unable   7 - 9 CM 80 - 100% Maximal Assist   10 - 14 CL 60 - 80% Moderate Assist   15 - 19 CK 40 - 60% Moderate Assist   20 - 22 CJ 20 - 40% Minimal Assist   23 CI 1-20% SBA / CGA   24 CH 0% Independent/ Mod I     Patient left HOB elevated with all lines intact, call button in reach and nurse notified.    Assessment:   Adela Jessica is a 76 y.o. female with a medical diagnosis of Shock and presents with Rehab identified problem list/impairments: Rehab identified problem list/impairments: impaired cardiopulmonary response to activity, impaired endurance, weakness, impaired functional mobilty, impaired balance, decreased ROM, decreased lower extremity function, impaired self care skills.  Pt is morbidly obese but reports mod I at home PTA.  Limited activity today 2* decreased sats and increased BP.  Needs therapy with goal to return to PLOF.  DC rec pending progress.  Pt is mod complexity because of examination, history evolving clinical presentation and decision making     Rehab potential is good.    Activity tolerance: Poor    Discharge recommendations: Discharge Facility/Level Of Care Needs:  (TBD)     Barriers to discharge: Barriers to Discharge: Decreased caregiver support, Inaccessible home environment    Equipment recommendations: Equipment Needed After Discharge:  (TBD)     GOALS:   Physical Therapy Goals        Problem: Physical Therapy Goal    Goal Priority Disciplines Outcome Goal Variances Interventions   Physical Therapy Goal     PT/OT, PT Ongoing (interventions implemented as appropriate)     Description:  Goals to be met by: 2/6/17      Patient will increase functional independence with mobility by performin. Supine to sit with SBA  2. Sit to supine with SBA  3. Sit to stand transfer with CGA  4. Bed to chair transfer with CGA using RW  5.  Assess gait when able                 PLAN:    Patient to be seen 6 x/week to address the above listed problems via gait training, therapeutic activities, therapeutic exercises  Plan of Care expires: 17  Plan of Care reviewed with: patient, family    Functional Assessment Tool Used: AMPAC  Score: 11  Functional Limitation: Mobility: Walking and moving around  Mobility: Walking and Moving Around Current Status (): CL  Mobility: Walking and Moving Around Goal Status (): CECILIA Mueller, PT  2017

## 2017-01-27 NOTE — PHYSICIAN QUERY
"PT Name: Adela Jessica  MR #: [id number]  Physician Query Form - Heart  Condition Clarification   Reviewer  Ext [phone] Makeda    This form is a permanent document in the medical record.     Query Date: January 27, 2017  By submitting this query, we are merely seeking further clarification of documentation. Please utilize your independent clinical judgment when addressing the question(s) below.  (The Medical record reflects the following:)   Indicators     Supporting Clinical Findings Location in Medical Record   X BNP = IEG=375 LAB 1/25   X EF = EF=50 LAB 1/25   X CXR findings:  The cardiomediastinal silhouette appears enlarged with prominence of the central pulmonary vasculature suggesting underlying edema. There is no evidence of pneumothorax. CXR 1/25    "Ascites" documented     X "SOB" or "ROBERT" documented patient complained of increased shortness of breath  H&P 1/25   X "Hypoxia" documented Acute Hypercapneic and hypoxic respiratory failure PN 1/27   X CHF, HFpEF documented Pulmonary edema with congestive heart failure PN 1/27   X "Edema" documented Ext: 2+ pitting edema H&P 1/25    Diuretics/Meds      Treatment:      Other:        Provider, please specify diagnosis or diagnoses associated with above clinical findings.    [  ] Acute Systolic Heart Failure ( EF < 40)*  [  ] Acute on Chronic Systolic Heart Failure ( EF < 40)*  [  ] Chronic Systolic Heart Failure ( EF < 40)*  [  ] Acute Diastolic Heart Failure ( EF > 40)*  [ x ] Acute on Chronic Diastolic Heart Failure( EF > 40)*  [  ] Chronic Diastolic Heart Failure ( EF > 40)*  [  ] Acute Combined Systolic and Diastolic Heart Failure  [  ] Acute on Chronic Combined Systolic and Diastolic Heart Failure  [  ] Chronic Combined Systolic and Diastolic Heart Failure  *American Heart Association  [  ] Other Cardiac Diagnosis (Specify) ___________________________________  [  ] Clinically undetermined    Please document in your progress notes daily for the duration of " treatment, until resolved, and include in your discharge summary.

## 2017-01-27 NOTE — PLAN OF CARE
Problem: Patient Care Overview  Goal: Plan of Care Review  Outcome: Ongoing (interventions implemented as appropriate)  Pt on cont. BiPAP in no apparent distress.  Breathing tx. Given with good pt. Effort.  Will cont. To monitor.

## 2017-01-27 NOTE — PLAN OF CARE
Problem: Physical Therapy Goal  Goal: Physical Therapy Goal  Goals to be met by: 17     Patient will increase functional independence with mobility by performin. Supine to sit with SBA  2. Sit to supine with SBA  3. Sit to stand transfer with CGA  4. Bed to chair transfer with CGA using RW  5. Assess gait when able   Outcome: Ongoing (interventions implemented as appropriate)  Pt eval.  Comes to EOB and despite being on Bipap sats drop to 85% and malik BP over 100 so pt returned to supine.  sats back to mid 90's  REC:  TBD pending progress

## 2017-01-27 NOTE — PT/OT/SLP EVAL
Speech Language Pathology  Bedside Swallow Study  Evaluation    Adela Jessica   MRN: [id number]   [id number]    Admitting Diagnosis: Cardiogenic shock    Diet recommendations: Solid Diet Level: Dental Soft  Liquid Diet Level: Thin   · Feed only when awake/alert,   · HOB to 90 degrees,   · Small bites/sips,   · Alternating bites/sips and   · Remain upright 30 minutes post meal    SLP Treatment Date: 01/27/17  Speech Start Time: 0825     Speech Stop Time: 0835     Speech Total (min): 10 min       TREATMENT BILLABLE MINUTES:  Eval Swallow and Oral Function 10    Diagnosis: Cardiogenic shock    H&P: Adela Jessica is a 76 y.o. female who  has a past medical history of Arthritis; CKD (chronic kidney disease); Dementia; Depression; DM2 (diabetes mellitus, type 2); GERD (gastroesophageal reflux disease); HLD (hyperlipidemia); HTN (hypertension); and HIWOT on CPAP.. The patient presented to [facility] on [facility]/25/2017 with a primary complaint of No chief complaint on file.  History unable to be provided by patient. The history was taken by granddaughter. The patient was in their usual state of health until about 1 month ago. She developed some cough. It was non-productive. About a week ago the patient became more somnolent and was sleeping more than usual. Today the patient complained of increased shortness of breath and was doing funny things such as touching her face and things that weren't there and wasn't making much sense when speaking. The granddaughter was alarmed and called 911.  Denies fevers, chills, nausea, vomiting. She has been sleeping more than usual so not eating and drinking at a normal level.    CXR: Results: Right central line distal tip in the SVC.  The ET tube and NG tube have been removed.  The cardiac silhouette is midline.  No large focal area of airspace disease.  No large pleural effusion.  No pneumothorax.  The osseous structures appear normal.    Extubated 1/26/17    Past Medical  History   Diagnosis Date    Arthritis     CKD (chronic kidney disease)     Dementia     Depression     DM2 (diabetes mellitus, type 2)     GERD (gastroesophageal reflux disease)     HLD (hyperlipidemia)     HTN (hypertension)     HIWOT on CPAP      Past Surgical History   Procedure Laterality Date    Hysterectomy         Has the patient been evaluated by SLP for swallowing? : Yes  Keep patient NPO?: No   General Precautions: Standard, aspiration, fall    Current Respiratory Status: nasal cannula       Prior diet: regular/thin; pt reports she typically eats without dentures.      Subjective:  Pt pleasant and cooperative. Minimal po intake  Patient goals: none stated.    Pain Ratin/10  Pain Rating Post-Intervention: 0/10    Objective:      Oral Musculature Evaluation  Oral Musculature: WFL  Dentition: scattered dentition, rarely or never uses dentures to eat  Mucosal Quality: dry  Mandibular Strength and Mobility: WFL  Oral Labial Strength and Mobility: impaired coordination  Lingual Strength and Mobility: WFL  Velar Elevation: WFL  Buccal Strength and Mobility: WFL  Volitional Cough: elicited; spontaneous coughing prior to po trials  Volitional Swallow: timely  Voice Prior to PO Intake: clear; mildy hoarseness noted     Bedside Swallow Eval:  Consistencies Assessed:   THIN:-ice chip x1, self regulated cup sip of water x2, self regulated straw sip of water x6  PUREE:- tsp bites of pudding x2  DENTAL SOFT:- tsp bites of peaches x5  SOLID: -bite of aime cracker x1  Oral Phase: excess chewing and slow oral transit time; coughed s/p 2/5 peach trials, however, noted coughing prior to po intake.   Pharyngeal Phase: coughing/choking    Bedside Swallow Study completed. Pt presents with oral dysphagia with aime cracker likely 2/2 dentition. Coughing noted prior to po trials and inconsistently throughout po trials. Recommend: Dental soft diet, thin liquids, upright for all po intake, small bites and sips (straws  okay), alternate bites and sips. ST will f/u to assess diet tolerance 2/2 noted inconsistent coughing.     Assessment:  Adela Jessica is a 76 y.o. female with a medical diagnosis of Cardiogenic shock and presents with inconsistent coughing. ST will f/u to assess diet tolerance.           Discharge recommendations: Discharge Facility/Level Of Care Needs:  (pending tolerance)     Goals:   SLP Goals        Problem: SLP Goal    Goal Priority Disciplines Outcome   SLP Goal     SLP Ongoing (interventions implemented as appropriate)   Description:  Short Term Goals:   1. Pt will tolerate a dental soft diet and thin liquids with no overt s/s of aspiration.   2. Pt will demonstrate/verbalize 3 safe swallowing precautions with min cues.                Plan:   Patient to be seen Therapy Frequency: 3 x/week   Plan of Care expires: 02/25/17  Plan of Care reviewed with: patient  SLP Follow-up?: Yes         SLP G-Codes  Functional Assessment Tool Used: noms  Score: 5  Functional Limitations: Swallowing  Swallow Current Status (): SYLVESTER  Swallow Goal Status (): WENDIE Celestin, CCC-SLP   Spectra: [phone]  01/27/2017

## 2017-01-27 NOTE — PHYSICIAN QUERY
PT Name: Adela Jessica  MR #: [id number]  Physician Query Form - Cause and Effect Relationship Clarification    Reviewer  Ext [phone] Makeda    This form is a permanent document in the medical record.     Query Date: January 27, 2017  By submitting this query, we are merely seeking further clarification of documentation. Please utilize your independent clinical judgment when addressing the question(s) below.      Supporting Clinical Findings     Location in record   SEPSIS possible septic shock                                                                                 STREPTOCOCCUS PYOGENES (GROUP A)  Many                                                                                                      PN 1/27    Respiratory CX 1/25                                                                                                                                                                                                       Provider, please clarify if there is any correlation between Sepsis and STREPTOCOCCUS PYOGENES (GROUP A) .     Are the conditions:        [ x ] Due to or associated with each other      [  ] Unrelated to each other      [  ] Other (Please Specify): _________________________      [  ] Clinically Undetermined

## 2017-01-27 NOTE — PLAN OF CARE
Problem: SLP Goal  Goal: SLP Goal  Short Term Goals:   1. Pt will tolerate a dental soft diet and thin liquids with no overt s/s of aspiration.   2. Pt will demonstrate/verbalize 3 safe swallowing precautions with min cues.   Outcome: Ongoing (interventions implemented as appropriate)  Bedside Swallow Study completed. Pt presents with oral dysphagia with aime cracker likely 2/2 dentition. Coughing noted prior to po trials and inconsistently throughout po trials. Recommend: Dental soft diet, thin liquids, upright for all po intake, small bites and sips (straws okay), alternate bites and sips. ST will f/u to assess diet tolerance 2/2 noted inconsistent coughing.   MALINI Burnett., CCC-SLP  Spectra: [phone]  01/27/2017

## 2017-01-27 NOTE — PROGRESS NOTES
[facility] IM Resident HO-3 Progress Note    Subjective:      The patient was able to be extubated to BIPAP late yesterday morning.  The patient was able to be placed on nasal cannula but is now back on bipap.  She denies any chest pain or shortness of breath.  No fevers or chills overnight.     Objective:   Last 24 Hour Vital Signs:  BP  Min: 81/51  Max: 124/58  Temp  Av.3 °F (36.8 °C)  Min: 98 °F (36.7 °C)  Max: 98.7 °F (37.1 °C)  Pulse  Av.4  Min: 82  Max: 111  Resp  Av.8  Min: 16  Max: 39  SpO2  Av.4 %  Min: 82 %  Max: 100 %  I/O last 3 completed shifts:  In: 1729.5 [I.V.:579.5; IV Piggyback:1150]  Out: 4453 [Urine:3610; Drains:400; Other:443]    Physical Examination:  Gen: Patient awake, alert, in no apparent distress  HEENT: PERRLA, BIPAP in place  Neck: Unable to appreicated jvp 2/2 patient's body habitus  CV: Distant heart sounds, unable to appreciate murmurs  Resp: End exp wheezes heard throughout, decreased air movement at bases bilaterally  Abd: Full, soft, non tender, non distended, old scar +BS  Ext: 1+ pitting edema, dry flaky skin   Skin: Dry, flaky, peeling over lower extremities  Neuro: awake. Alert, following commands      Laboratory:  Laboratory Data Reviewed: yes  Pertinent Findings:  Recent Labs      17   0400  17   1330  17   0556  17   0439   WBC  15.91*   --   23.25*  14.38*   HGB  10.3*   --   11.2*  10.4*   HCT  33.5*  34*  34.9*  33.0*   PLT  433*   --   419*  337   MCV  96   --   93  96   RDW  14.8*   --   14.8*  14.9*   GRAN  89.0*   --   89.0*  78.9*  11.4*   LYMPH  7.0*  CANCELED   --   6.0*  CANCELED  15.8*  2.3   MONO  1.0*  CANCELED   --   0.0*  CANCELED  4.8  0.7   EOS  CANCELED   --   CANCELED  0.0   BASO  CANCELED   --   CANCELED  0.01   EOSINOPHIL  0.0   --   0.0  0.0   BASOPHIL  0.0   --   0.0  0.1     cmp pending    Microbiology Data Reviewed: yes  Pertinent Findings:  Sputum strep pyogenes, urine and blood ngtd    Other  Results:  EKG (my interpretation): no new    Radiology Data Reviewed: yes  Pertinent Findings:      Current Medications:     Infusions:   insulin (HUMAN R) infusion (adults) Stopped (01/26/17 0930)    norepinephrine bitartrate-D5W Stopped (01/26/17 1107)    propofol          Scheduled:   albuterol-ipratropium 2.5mg-0.5mg/3mL  3 mL Nebulization Q4H    atorvastatin  80 mg Oral Daily    chlorhexidine  15 mL Mouth/Throat BID    ciprofloxacin  400 mg Intravenous Q24H    donepezil  5 mg Oral QHS    fluticasone-vilanterol  1 puff Inhalation Daily    heparin (porcine)  5,000 Units Subcutaneous Q12H    methylPREDNISolone sodium succinate  40 mg Intravenous Daily    pantoprazole  40 mg Intravenous Daily    pentoxifylline  400 mg Oral BID WM    piperacillin-tazobactam 4.5 g in dextrose 5 % 100 mL IVPB (ready to mix system)  4.5 g Intravenous Q12H    vancomycin (VANCOCIN) IVPB  1,500 mg Intravenous Q24H        PRN:  dextrose 50%, dextrose 50%, dextrose 50%, heparin (porcine)    Antibiotics and Day Number of Therapy:  Vanc, cipro, zosyn started 1/25/17    Lines and Day Number of Therapy:  trialysis cath    Assessment:     Adela Jessica is a 76 y.o.female with  Patient Active Problem List    Diagnosis Date Noted    Centrilobular emphysema 01/26/2017    Pulmonary edema with congestive heart failure     SUBHASH (acute kidney injury) 01/25/2017    Morbid obesity due to excess calories 01/25/2017    Encephalopathy acute 01/25/2017    Uremia 01/25/2017    Tinea pedis of both feet 01/25/2017    Normocytic anemia 01/25/2017    Severe sepsis with acute organ dysfunction 01/25/2017    Alzheimer's dementia without behavioral disturbance 01/25/2017    Hyponatremia 01/25/2017    Cardiogenic shock 01/25/2017    Hyperkalemia 11/11/2013    CKD (chronic kidney disease) stage 4, GFR 15-29 ml/min 11/11/2013    Leukocytosis 11/11/2013    Type 2 diabetes mellitus with complication, with long-term current use of insulin  "11/11/2013    COPD (chronic obstructive pulmonary disease) 11/11/2013    Essential hypertension 11/11/2013    Mixed hyperlipidemia 11/11/2013    Troponin I above reference range 11/11/2013    Acute on chronic respiratory failure with hypoxia and hypercapnia 11/10/2013        Plan:     76 yr old female w/ pmhx of ckd IV, DM, copd, HTN, HPL, HIWOT was transferred from OSH for "copd exacerbation and need for dialysis".      Neuro      Encephalopathy  - likely 2/2 metabolic causes such as hypoxia and hypercapneia could also be septic in nature vs cva vs toxidrome vs uremia vs dementia  -CT head ordered but not done due to patient's critical status  -patient was only minimally responsive but sedated  -patient placed on precedex, fentanyl, proprofol  -no focal deficits so no need for MRI      CV     Shock  -most likely from multiple etiologies septic vs cardiogenic vs hypovolemic  -patient was hypotensive on arrival with pressures only dopplerable  -fluids wide open and patient placed on levophed for which she remains  -continue to hold bp meds  -treating sepsis with broad spectrum antibiotics at this time  -ECHO with mildly depressed EF; limited study  -Cardiology was consulted and are signing off as they believe her heart is fine  -resolved; patient is off of pressors      Intermediate troponin  -troponin was intermediate at OSH; patient's troponin leveled off at ~0.9; stopped trending  -ekg without signs of ischemia did involve cards because we believe the QRS had widened and were concerned about LBBB; cardiology has signed off as they have confirmed this is NOT LBBB  -echo ordered and results as above  -osh with cardiomegaly on cxr  -patient on Pentoxyphylline for PAD? Patient on statin will obtain lipid panel and restart statin when tolerating po      Resp     Acute Hypercapneic and hypoxic respiratory failure  -patient was non-responsive with abg of ~ 7.1/85  -patient was intubated shortly after the gas " results  -cxr ordered   -improving acidosis and hypercapneia  -pulmonary consulted for help with the vent  -last gas 7.325/59/98/30  -patient extubated 1/26/17 to bipap      copd exacerbation   - scheduled nebs and steroids      FEN/GI     Hyponatremia And hyperkalemia  -patient s/p 1 L NS  -patient NPO  -patient was hypovolemic hyponatremia at 129; improved to wnl  -patient was hyperkalemic at 5.4 at osh. Patient not shifted so insulin, d50, kayaxalate  -hyperkalemia initially worsened and was shifted several more times prior to CRRT initiated  -patient without ekg changes  -hyperkalemia resolved  -protonix for gi ppx; which will be discontinued if patient approved for an oral diet  -resolved      Heme/ID     SEPSIS possible septic shock  -patient meet sepsis criteria from OSH based on heart rate, respirations and wbc count  -patient advanced to severe sepsis on arrival; has so far been responsive to fluids; advanced to requiring pressors  -trilaysis cath placed  -patient on vanc, zosyn, cipro patient was pancultured  -strep pyogenes from sputum culture ngtd on urine or blood; will consider deescalting today; lactate negative  -procalcitonin weakly positive       AOCD  -new studies ordered and with low-normal iron and elevated ferritin, will most likely need HD and will likely be candidate for venofer and epo w/ hd     AGMA  -however patient probably chronic co2 retainer so this is probably acidotic for patient  -lactate negative likely 2/2 renal failure  -improving bicarb however AG still elevated; will likely improve with continuation of crrt  -resolved     Thrombocytosis  -likely reactive to infectious process  -will continue to monitor  -resolved      nephro     Acute Renal Failure with hyperkalemia  -trialysis catheter placed; patient started on crrt  -patient is SUBHASH on CKDIV likely advanced to CKDV  -renal on board; appreicate recs  -hyperkalemia treated as above and resolved after addition of crrt  -FENA  11.5%  -patient may be trialed off of crrt today      Endocrine     Diabetes with neuropathy  -A1c 7.5  -patient placed on insulin gtt with q 1 blood sugars goal 140-180  -patient on metformin and glipizide +/- insulin at home will hold while hospitalized  -will continue lyrica for neuropathy as tolerates po      Psych     HIstory of dementia and depression  -patient with history of dementia on donepezil; will hold  -patient on cumbalta, lyrica; will hold      Ppx: Hep and protonix    Diet:  Currently NPO but speech consulted for diet recs      Dispo: patient still on BIPAP therefore requiring ICU.  Goal:  Wean patient to nasal cannula this morning.       Hawa Blue  [facility] Internal Medicine HO-3  [facility] IM Service Team A    [facility] Medicine Hospitalist Pager numbers:   [facility] Hospitalist Medicine Team A (Izabela/Brisa): [phone]  [facility] Hospitalist Medicine Team B (Bart/Benjamin):  [phone]

## 2017-01-28 PROBLEM — R53.81 PHYSICAL DECONDITIONING: Status: ACTIVE | Noted: 2017-01-28

## 2017-01-28 PROBLEM — E83.42 HYPOMAGNESEMIA: Status: ACTIVE | Noted: 2017-01-28

## 2017-01-28 LAB
ALBUMIN SERPL BCP-MCNC: 2.2 G/DL
ALP SERPL-CCNC: 88 U/L
ALT SERPL W/O P-5'-P-CCNC: 13 U/L
ANION GAP SERPL CALC-SCNC: 8 MMOL/L
AST SERPL-CCNC: 12 U/L
BASOPHILS # BLD AUTO: 0 K/UL
BASOPHILS NFR BLD: 0 %
BILIRUB SERPL-MCNC: 0.3 MG/DL
BUN SERPL-MCNC: 59 MG/DL
CALCIUM SERPL-MCNC: 9.5 MG/DL
CHLORIDE SERPL-SCNC: 100 MMOL/L
CO2 SERPL-SCNC: 34 MMOL/L
CREAT SERPL-MCNC: 3.1 MG/DL
DIFFERENTIAL METHOD: ABNORMAL
EOSINOPHIL # BLD AUTO: 0 K/UL
EOSINOPHIL NFR BLD: 0.1 %
ERYTHROCYTE [DISTWIDTH] IN BLOOD BY AUTOMATED COUNT: 14.7 %
EST. GFR  (AFRICAN AMERICAN): 16 ML/MIN/1.73 M^2
EST. GFR  (NON AFRICAN AMERICAN): 14 ML/MIN/1.73 M^2
GLUCOSE SERPL-MCNC: 130 MG/DL
HCT VFR BLD AUTO: 34.7 %
HGB BLD-MCNC: 10.7 G/DL
LYMPHOCYTES # BLD AUTO: 2.8 K/UL
LYMPHOCYTES NFR BLD: 20.2 %
MAGNESIUM SERPL-MCNC: 1.4 MG/DL
MCH RBC QN AUTO: 29.7 PG
MCHC RBC AUTO-ENTMCNC: 30.8 %
MCV RBC AUTO: 96 FL
MONOCYTES # BLD AUTO: 0.7 K/UL
MONOCYTES NFR BLD: 5 %
NEUTROPHILS # BLD AUTO: 10.2 K/UL
NEUTROPHILS NFR BLD: 74 %
PLATELET # BLD AUTO: 284 K/UL
PMV BLD AUTO: 8.6 FL
POCT GLUCOSE: 129 MG/DL (ref 70–110)
POCT GLUCOSE: 162 MG/DL (ref 70–110)
POCT GLUCOSE: 225 MG/DL (ref 70–110)
POTASSIUM SERPL-SCNC: 4.5 MMOL/L
PROT SERPL-MCNC: 7.4 G/DL
RBC # BLD AUTO: 3.6 M/UL
SODIUM SERPL-SCNC: 142 MMOL/L
WBC # BLD AUTO: 13.75 K/UL

## 2017-01-28 PROCEDURE — 85025 COMPLETE CBC W/AUTO DIFF WBC: CPT

## 2017-01-28 PROCEDURE — 25000003 PHARM REV CODE 250

## 2017-01-28 PROCEDURE — 25000242 PHARM REV CODE 250 ALT 637 W/ HCPCS: Performed by: STUDENT IN AN ORGANIZED HEALTH CARE EDUCATION/TRAINING PROGRAM

## 2017-01-28 PROCEDURE — 63600175 PHARM REV CODE 636 W HCPCS: Performed by: INTERNAL MEDICINE

## 2017-01-28 PROCEDURE — 94640 AIRWAY INHALATION TREATMENT: CPT

## 2017-01-28 PROCEDURE — 25000003 PHARM REV CODE 250: Performed by: STUDENT IN AN ORGANIZED HEALTH CARE EDUCATION/TRAINING PROGRAM

## 2017-01-28 PROCEDURE — 80053 COMPREHEN METABOLIC PANEL: CPT

## 2017-01-28 PROCEDURE — 25000003 PHARM REV CODE 250: Performed by: INTERNAL MEDICINE

## 2017-01-28 PROCEDURE — 83735 ASSAY OF MAGNESIUM: CPT

## 2017-01-28 PROCEDURE — 11000001 HC ACUTE MED/SURG PRIVATE ROOM

## 2017-01-28 RX ORDER — MAGNESIUM SULFATE HEPTAHYDRATE 40 MG/ML
2 INJECTION, SOLUTION INTRAVENOUS ONCE
Status: COMPLETED | OUTPATIENT
Start: 2017-01-28 | End: 2017-01-28

## 2017-01-28 RX ADMIN — FLUTICASONE FUROATE AND VILANTEROL TRIFENATATE 1 PUFF: 100; 25 POWDER RESPIRATORY (INHALATION) at 08:01

## 2017-01-28 RX ADMIN — IPRATROPIUM BROMIDE AND ALBUTEROL SULFATE 3 ML: .5; 3 SOLUTION RESPIRATORY (INHALATION) at 04:01

## 2017-01-28 RX ADMIN — CHLORHEXIDINE GLUCONATE 15 ML: 1.2 RINSE ORAL at 08:01

## 2017-01-28 RX ADMIN — PREDNISONE 50 MG: 10 TABLET ORAL at 04:01

## 2017-01-28 RX ADMIN — PENTOXIFYLLINE 400 MG: 400 TABLET, FILM COATED, EXTENDED RELEASE ORAL at 08:01

## 2017-01-28 RX ADMIN — MAGNESIUM SULFATE IN WATER 2 G: 40 INJECTION, SOLUTION INTRAVENOUS at 10:01

## 2017-01-28 RX ADMIN — HEPARIN SODIUM 5000 UNITS: 5000 INJECTION, SOLUTION INTRAVENOUS; SUBCUTANEOUS at 08:01

## 2017-01-28 RX ADMIN — MOXIFLOXACIN HYDROCHLORIDE 400 MG: 400 INJECTION, SOLUTION INTRAVENOUS at 12:01

## 2017-01-28 RX ADMIN — ATORVASTATIN CALCIUM 80 MG: 40 TABLET, FILM COATED ORAL at 08:01

## 2017-01-28 RX ADMIN — INSULIN ASPART 2 UNITS: 100 INJECTION, SOLUTION INTRAVENOUS; SUBCUTANEOUS at 11:01

## 2017-01-28 RX ADMIN — METHYLPREDNISOLONE SODIUM SUCCINATE 40 MG: 40 INJECTION, POWDER, FOR SOLUTION INTRAMUSCULAR; INTRAVENOUS at 08:01

## 2017-01-28 RX ADMIN — IPRATROPIUM BROMIDE AND ALBUTEROL SULFATE 3 ML: .5; 3 SOLUTION RESPIRATORY (INHALATION) at 11:01

## 2017-01-28 RX ADMIN — PENTOXIFYLLINE 400 MG: 400 TABLET, FILM COATED, EXTENDED RELEASE ORAL at 04:01

## 2017-01-28 RX ADMIN — IPRATROPIUM BROMIDE AND ALBUTEROL SULFATE 3 ML: .5; 3 SOLUTION RESPIRATORY (INHALATION) at 12:01

## 2017-01-28 RX ADMIN — INSULIN ASPART 4 UNITS: 100 INJECTION, SOLUTION INTRAVENOUS; SUBCUTANEOUS at 04:01

## 2017-01-28 RX ADMIN — INSULIN ASPART 1 UNITS: 100 INJECTION, SOLUTION INTRAVENOUS; SUBCUTANEOUS at 10:01

## 2017-01-28 RX ADMIN — DONEPEZIL HYDROCHLORIDE 5 MG: 5 TABLET ORAL at 08:01

## 2017-01-28 RX ADMIN — IPRATROPIUM BROMIDE AND ALBUTEROL SULFATE 3 ML: .5; 3 SOLUTION RESPIRATORY (INHALATION) at 08:01

## 2017-01-28 NOTE — PLAN OF CARE
Continued on telemetry and fall precaution monitoring. No true red alarm ectopy . Curiel patent. Right ij  Dialysis dressing dry and intact. Denies any discomfort. Right radial dressing intact ,no ss of bleeding.

## 2017-01-28 NOTE — NURSING TRANSFER
Nursing Transfer Note      1/28/2017      Transfer to 466 from      Transfer via Bed    Transfer with continuous cardiac monitoring, O2 at 3L/NC.    Transported by RN x2      Medicines sent: Yes    Chart send with patient: Yes    Notified: Patient's Nephew, receiving RN     Patient reassessed at: 1/28/2017, 11:35 AM     Upon arrival to floor: Pt transferred to new bed, bed locked in low position, chart and meds handed off to recieveing DIMPLE Price.   Pt's nephew at bedside.

## 2017-01-28 NOTE — PLAN OF CARE
Received from ICU ,awake alert ,Placed on telemetry and fall precautions mnitoring as well asO2 3 L NC,pox-95-96% Curiel  Patent . Right IJ Trialysis  In placeplace .Oriented to room  .Denies any discomfort.

## 2017-01-28 NOTE — PROGRESS NOTES
[facility] IM Resident HO-3 Progress Note    Subjective:      The patient had no acute events overnight.  She has no current complaints.  Denies sob, chest pain.  The patient was transitioned from bipap to NC.     Objective:   Last 24 Hour Vital Signs:  BP  Min: 94/52  Max: 134/61  Temp  Av.9 °F (36.6 °C)  Min: 96.5 °F (35.8 °C)  Max: 98.5 °F (36.9 °C)  Pulse  Av.7  Min: 78  Max: 99  Resp  Av.5  Min: 13  Max: 49  SpO2  Av.5 %  Min: 89 %  Max: 100 %  Weight  Av.6 kg (318 lb 12.6 oz)  Min: 144.6 kg (318 lb 12.6 oz)  Max: 144.6 kg (318 lb 12.6 oz)  I/O last 3 completed shifts:  In: 425 [P.O.:60; I.V.:65; IV Piggyback:300]  Out: 4395 [Urine:4395]    Physical Examination:   Gen: Patient awake, alert, in no apparent distress  HEENT: PERRLA, NC in place  Neck: Unable to appreicated jvp 2/2 patient's body habitus  CV: Distant heart sounds, unable to appreciate murmurs  Resp: End exp wheezes heard throughout, decreased air movement at bases bilaterally  Abd: Full, soft, non tender, non distended, old scar +BS  Ext: 1+ pitting edema, dry flaky skin   Skin: Dry, flaky, peeling over lower extremities  Neuro: awake. Alert, following commands     Laboratory:  Laboratory Data Reviewed: yes  Pertinent Findings:  Recent Labs      17   1330  17   0556  17   0439  17   0530   WBC   --   23.25*  14.38*  13.75*   HGB   --   11.2*  10.4*  10.7*   HCT  34*  34.9*  33.0*  34.7*   PLT   --   419*  337  284   MCV   --   93  96  96   RDW   --   14.8*  14.9*  14.7*   GRAN   --   89.0*  78.9*  11.4*  74.0*  10.2*   LYMPH   --   6.0*  CANCELED  15.8*  2.3  20.2  2.8   MONO   --   0.0*  CANCELED  4.8  0.7  5.0  0.7   EOS   --   CANCELED  0.0  0.0   BASO   --   CANCELED  0.01  0.00   EOSINOPHIL   --   0.0  0.0  0.1   BASOPHIL   --   0.0  0.1  0.0     Recent Labs      17   1647  17   0556  17   2201  17   0439  17   0530   NA  130*  132*  132*  136  136  136  142   K   4.9  4.8  4.8  5.0  4.4  4.4  4.5   CL  91*  94*  94*  96  97  97  100   CO2  24  25  25  31*  31*  31*  34*   BUN  110*  78*  78*  75*  72*  72*  59*   CREATININE  10.2*  6.8*  6.8*  5.7*  5.0*  5.0*  3.1*     Recent Labs      01/27/17   0825  01/27/17   1226  01/27/17   1815  01/27/17   2146  01/28/17   0543   POCTGLUCOSE  86  117*  144*  251*  129*     Recent Labs      01/25/17   1542  01/26/17   0556  01/26/17   2201  01/27/17   0439  01/28/17   0530   PROT   --   7.2   --   7.1  7.4   ALBUMIN  2.0*  2.2*  2.2*  2.0*  2.1*  2.1*  2.2*   BILITOT   --   0.3   --   0.3  0.3   AST   --   12   --   12  12   ALT   --   13   --   12  13   ALKPHOS   --   113   --   94  88         Microbiology Data Reviewed: yes  Pertinent Findings:  Sputum with strep pyogenes    Other Results:  EKG (my interpretation): no new    Radiology Data Reviewed: yes  Pertinent Findings:  No new    Current Medications:     Infusions:   norepinephrine bitartrate-D5W Stopped (01/27/17 1500)        Scheduled:   albuterol-ipratropium 2.5mg-0.5mg/3mL  3 mL Nebulization Q4H    atorvastatin  80 mg Oral Daily    chlorhexidine  15 mL Mouth/Throat BID    donepezil  5 mg Oral QHS    fluticasone-vilanterol  1 puff Inhalation Daily    heparin (porcine)  5,000 Units Subcutaneous Q12H    magnesium sulfate IVPB  2 g Intravenous Once    methylPREDNISolone sodium succinate  40 mg Intravenous Daily    moxifloxacin  400 mg Intravenous Q24H    pentoxifylline  400 mg Oral BID WM        PRN:  dextrose 50%, dextrose 50%, glucagon (human recombinant), glucose, glucose, heparin (porcine), insulin aspart    Antibiotics and Day Number of Therapy:  moxifloxacin started 1/27/16 but was covered with broad spectrum since 1/25/17    Lines and Day Number of Therapy:  trialysis cath    Assessment:     Adela Jessica is a 76 y.o.female with  Patient Active Problem List    Diagnosis Date Noted    Pulmonary edema with congestive heart failure 01/27/2017     "Streptococcal pneumonia 01/27/2017    Centrilobular emphysema 01/26/2017    SUBHASH (acute kidney injury) 01/25/2017    Morbid obesity due to excess calories 01/25/2017    Encephalopathy acute 01/25/2017    Uremia 01/25/2017    Tinea pedis of both feet 01/25/2017    Normocytic anemia 01/25/2017    Severe sepsis with acute organ dysfunction 01/25/2017    Alzheimer's dementia without behavioral disturbance 01/25/2017    Hyponatremia 01/25/2017    Shock 01/25/2017    Hyperkalemia 11/11/2013    CKD (chronic kidney disease) stage 4, GFR 15-29 ml/min 11/11/2013    Leukocytosis 11/11/2013    Type 2 diabetes mellitus with complication, with long-term current use of insulin 11/11/2013    COPD (chronic obstructive pulmonary disease) 11/11/2013    Essential hypertension 11/11/2013    Mixed hyperlipidemia 11/11/2013    Troponin I above reference range 11/11/2013    Acute on chronic respiratory failure with hypoxia and hypercapnia 11/10/2013        Plan:     76 yr old female w/ pmhx of ckd IV, DM, copd, HTN, HPL, HIWOT was transferred from OSH for "copd exacerbation and need for dialysis".      Neuro      Encephalopathy  - likely 2/2 metabolic causes such as hypoxia and hypercapneia could also be septic in nature vs cva vs toxidrome vs uremia vs dementia  -CT head ordered but not done due to patient's critical status  -patient was only minimally responsive on arrival  -patient placed on precedex, fentanyl, proprofol-now off  -no focal deficits so no need for MRI  -resolved      CV      Shock  -most likely from multiple etiologies septic vs cardiogenic vs hypovolemic  -patient was hypotensive on arrival with pressures only dopplerable  -fluids wide open and patient placed on levophed for which was weaned off of  -continue to hold bp meds  -was on broad spectrum but deescalated to moxi 1/27/17  -Cardiology was consulted and are signing off as they believe her heart is fine  -resolved; patient is off of " pressors  -remove kenneth    Acute systolic heart Failure (HFrEF)  -EF mildly depressed  -will ensure patient is on appropriate meds at discharge  -will hold antihypertensives given patient's hypotension      Intermediate troponin  -troponin was intermediate at OSH; patient's troponin leveled off at ~0.9; stopped trending  -ekg without signs of ischemia did involve cards because we believe the QRS had widened and were concerned about LBBB; cardiology has signed off as they have confirmed this is NOT LBBB  -echo ordered and results as above  -osh with cardiomegaly on cxr  -patient on Pentoxyphylline for PAD? Patient on statin will obtain lipid panel and restart statin when tolerating po      Resp      Acute Hypercapneic and hypoxic respiratory failure  -patient was non-responsive with abg of ~ 7.1/85  -patient was intubated shortly after the gas results  -improving acidosis and hypercapneia  -pulmonary consulted for help with the vent  -last gas 7.325/59/98/30  -patient extubated 1/26/17 to bipap; weaned to 3L NC      copd exacerbation   - scheduled nebs and steroids  -improved      FEN/GI      Hyponatremia And hyperkalemia  -patient s/p 1 L NS  -patient NPO  -patient was hypovolemic hyponatremia at 129; improved to wnl  -patient was hyperkalemic at 5.4 at osh. Patient not shifted so insulin, d50, kayaxalate  -hyperkalemia initially worsened and was shifted several more times prior to CRRT initiated  -patient without ekg changes  -hyperkalemia resolved  -protonix for gi ppx; which will be discontinued if patient approved for an oral diet  -resolved      Heme/ID      SEPSIS possible septic shock 2/2 step pyogenes  -patient meet sepsis criteria from OSH based on heart rate, respirations and wbc count  -patient advanced to severe sepsis on arrival; has so far been responsive to fluids; advanced to requiring pressors  -trilaysis cath placed  -patient on vanc, zosyn, cipro patient was pancultured  -strep pyogenes from  sputum culture ngtd on urine or blood;deescalated to moxifloxacin on 1/27/17; lactate negative  -procalcitonin weakly positive         AOCD  -new studies ordered and with low-normal iron and elevated ferritin,       AGMA  -however patient probably chronic co2 retainer so this is probably acidotic for patient  -lactate negative likely 2/2 renal failure  -resolved      Thrombocytosis  -likely reactive to infectious process  -will continue to monitor  -resolved      nephro      Acute Renal Failure with hyperkalemia  -trialysis catheter placed; patient started on crrt  -patient is SUBHASH on CKDIV likely advanced to CKDV  -renal on board; appreicate recs  -hyperkalemia treated as above and resolved after addition of crrt  -FENA 11.5%  -awaiting renal recs but likely no more need for dialysis  -improving      Endocrine      Diabetes with neuropathy  -A1c 7.5  -patient placed on insulin gtt with q 1 blood sugars goal [phone] to sliding scale as patient was started on oral diet  -patient on metformin and glipizide +/- insulin at home will hold while hospitalized  -will continue lyrica for neuropathy as tolerates po      Psych      HIstory of dementia and depression  -patient with history of dementia on donepezil; will restart  -patient on cumbalta, lyrica; will restart      Ppx: Hep and protonix     Diet: dental soft renal with thin liquids      Dispo: patient to step down to the floor today        Hawa Blue  [facility] Internal Medicine HO-3  [facility] IM Service Team A    [facility] Medicine Hospitalist Pager numbers:   [facility] Hospitalist Medicine Team A (Izabela/Brisa): [phone]  [facility] Hospitalist Medicine Team B (Bart/Benjamin):  [phone]

## 2017-01-28 NOTE — PROGRESS NOTES
Progress Note  Nephrology    Admit Date: 1/25/2017   LOS: 3 days     SUBJECTIVE:     Follow-up For:SUBHASH - resolving    Pt. To transfer to telemetry.    Review of Systems:  Constitutional: no fever or chills  Respiratory: no cough or shortness of breath  Cardiovascular: no chest pain or palpitations  Gastrointestinal: no nausea or vomiting, no abdominal pain or change in bowel habits    OBJECTIVE:     Vital Signs (Most Recent)  Temp: 98.7 °F (37.1 °C) (01/28/17 1130)  Pulse: 94 (01/28/17 1100)  Resp: (!) 37 (01/28/17 1100)  BP: 134/64 (01/28/17 1100)  SpO2: 95 % (01/28/17 1100)    Vital Signs Range (Last 24H):  Temp:  [96.5 °F (35.8 °C)-98.7 °F (37.1 °C)]   Pulse:  []   Resp:  [13-39]   BP: (100-134)/(51-64)   SpO2:  [90 %-100 %]   Arterial Line BP: (103-160)/()       Date 01/28/17 0700 - 01/29/17 0659   Shift 5795-3044 8474-5398 2397-3717 24 Hour Total   I  N  T  A  K  E   P.O. 300   300    Shift Total  (mL/kg) 300  (2.1)   300  (2.1)   O  U  T  P  U  T   Urine  (mL/kg/hr) 485   485    Shift Total  (mL/kg) 485  (3.4)   485  (3.4)   Weight (kg) 144.6 144.6 144.6 144.6     Physical Exam:   General: well developed, well nourished  Lungs:  clear to auscultation bilaterally and normal respiratory effort  Cardiovascular: Heart: regular rate and rhythm, S1, S2 normal, no murmur, click, rub or gallop. Chest Wall: no tenderness. Extremities: no cyanosis or edema, or clubbing. Pulses: 2+ and symmetric.  Abdomen/Rectal: Abdomen: soft, non-tender non-distented; bowel sounds normal; no masses,  no organomegaly. Rectal: not examined    Laboratory:  CBC:   Recent Labs  Lab 01/28/17  0530   WBC 13.75*   RBC 3.60*   HGB 10.7*   HCT 34.7*      MCV 96   MCH 29.7   MCHC 30.8*     CMP:   Recent Labs  Lab 01/28/17  0530   *   CALCIUM 9.5   ALBUMIN 2.2*   PROT 7.4      K 4.5   CO2 34*      BUN 59*   CREATININE 3.1*   ALKPHOS 88   ALT 13   AST 12   BILITOT 0.3       Recent Labs  Lab 01/25/17  1710    COLORU Yellow   SPECGRAV 1.020   PHUR 5.0   PROTEINUA Negative   NITRITE Negative   LEUKOCYTESUR Negative   UROBILINOGEN Negative       Diagnostic Results:  Labs: Reviewed  X-Ray: Reviewed    ASSESSMENT/PLAN:     Active Hospital Problems    Diagnosis  POA    *Shock [R57.9]  Yes    Hypomagnesemia [E83.42]  Yes    Pulmonary edema with congestive heart failure [I50.1]  Yes    Streptococcal pneumonia [J15.4]  Yes    Centrilobular emphysema [J43.2]  Yes    SUBHASH (acute kidney injury) [N17.9]  Yes    Morbid obesity due to excess calories [E66.01]  Yes    Encephalopathy acute [G93.40]  Yes    Uremia [N19]  Yes    Tinea pedis of both feet [B35.3]  Yes    Normocytic anemia [D64.9]  Yes    Severe sepsis with acute organ dysfunction [A41.9, R65.20]  Yes    Alzheimer's dementia without behavioral disturbance [G30.9, F02.80]  Yes    Hyponatremia [E87.1]  Yes    Hyperkalemia [E87.5]  Yes    Leukocytosis [D72.829]  Yes    COPD (chronic obstructive pulmonary disease) [J44.9]  Yes    Type 2 diabetes mellitus with complication, with long-term current use of insulin [E11.8, Z79.4]  Yes    CKD (chronic kidney disease) stage 4, GFR 15-29 ml/min [N18.4]  Yes    Essential hypertension [I10]  Yes    Mixed hyperlipidemia [E78.2]  Yes    Troponin I above reference range [R79.89]  Yes    Acute on chronic respiratory failure with hypoxia and hypercapnia [J96.21, J96.22]  Yes      Resolved Hospital Problems    Diagnosis Date Resolved POA    Idiopathic hypotension [I95.0] 01/25/2017 Yes    Encephalopathy [G93.40] 01/25/2017 Yes    LBBB (left bundle branch block) [I44.7] 01/25/2017 Yes       SUBHASH- resolving with improved creat. And diuresing well.  Continue current treatment plan and monitor I/Os.  Dialysis on hold.

## 2017-01-28 NOTE — PLAN OF CARE
Problem: Patient Care Overview  Goal: Plan of Care Review  Patient tolerated therapy well.  Productive cough.  Saturations were as noted in charting.  Will continue to monitor.

## 2017-01-28 NOTE — PROGRESS NOTES
Doing well.  Extubated. Awake. Recognized me and remembered that she has seen me in the office once   BP better.  Diuresing well.  CRRT discontinued.  Creatinine has come down from 12 on admit to 6 yesterday and to 5 today.  With the large urine output it sjould come down more.  Her last creatinine prior to admit was 1.5 on 12/27/2016.  She still does not remember what happened to her other than having a cold.  Continue to follow and watch.

## 2017-01-29 LAB
ALBUMIN SERPL BCP-MCNC: 2.4 G/DL
ALP SERPL-CCNC: 90 U/L
ALT SERPL W/O P-5'-P-CCNC: 14 U/L
ANION GAP SERPL CALC-SCNC: 10 MMOL/L
AST SERPL-CCNC: 13 U/L
BASOPHILS # BLD AUTO: 0.01 K/UL
BASOPHILS NFR BLD: 0.1 %
BILIRUB SERPL-MCNC: 0.3 MG/DL
BUN SERPL-MCNC: 49 MG/DL
CALCIUM SERPL-MCNC: 9.6 MG/DL
CHLORIDE SERPL-SCNC: 98 MMOL/L
CO2 SERPL-SCNC: 32 MMOL/L
CREAT SERPL-MCNC: 2.3 MG/DL
DIFFERENTIAL METHOD: ABNORMAL
EOSINOPHIL # BLD AUTO: 0 K/UL
EOSINOPHIL NFR BLD: 0 %
ERYTHROCYTE [DISTWIDTH] IN BLOOD BY AUTOMATED COUNT: 14.8 %
EST. GFR  (AFRICAN AMERICAN): 23 ML/MIN/1.73 M^2
EST. GFR  (NON AFRICAN AMERICAN): 20 ML/MIN/1.73 M^2
GLUCOSE SERPL-MCNC: 162 MG/DL
HCT VFR BLD AUTO: 39.1 %
HGB BLD-MCNC: 11.9 G/DL
LYMPHOCYTES # BLD AUTO: 1.3 K/UL
LYMPHOCYTES NFR BLD: 11.7 %
MAGNESIUM SERPL-MCNC: 1.6 MG/DL
MCH RBC QN AUTO: 29.9 PG
MCHC RBC AUTO-ENTMCNC: 30.4 %
MCV RBC AUTO: 98 FL
MONOCYTES # BLD AUTO: 0.2 K/UL
MONOCYTES NFR BLD: 2.2 %
NEUTROPHILS # BLD AUTO: 9.4 K/UL
NEUTROPHILS NFR BLD: 84.9 %
PLATELET # BLD AUTO: 317 K/UL
PMV BLD AUTO: 9.2 FL
POCT GLUCOSE: 105 MG/DL (ref 70–110)
POCT GLUCOSE: 154 MG/DL (ref 70–110)
POCT GLUCOSE: 175 MG/DL (ref 70–110)
POCT GLUCOSE: 192 MG/DL (ref 70–110)
POCT GLUCOSE: 207 MG/DL (ref 70–110)
POCT GLUCOSE: 228 MG/DL (ref 70–110)
POCT GLUCOSE: 250 MG/DL (ref 70–110)
POTASSIUM SERPL-SCNC: 5 MMOL/L
PROT SERPL-MCNC: 8.1 G/DL
RBC # BLD AUTO: 3.98 M/UL
SODIUM SERPL-SCNC: 140 MMOL/L
WBC # BLD AUTO: 11.07 K/UL

## 2017-01-29 PROCEDURE — 83735 ASSAY OF MAGNESIUM: CPT

## 2017-01-29 PROCEDURE — 25000242 PHARM REV CODE 250 ALT 637 W/ HCPCS: Performed by: STUDENT IN AN ORGANIZED HEALTH CARE EDUCATION/TRAINING PROGRAM

## 2017-01-29 PROCEDURE — 25000003 PHARM REV CODE 250

## 2017-01-29 PROCEDURE — 97530 THERAPEUTIC ACTIVITIES: CPT

## 2017-01-29 PROCEDURE — 85025 COMPLETE CBC W/AUTO DIFF WBC: CPT

## 2017-01-29 PROCEDURE — 97110 THERAPEUTIC EXERCISES: CPT

## 2017-01-29 PROCEDURE — 25000003 PHARM REV CODE 250: Performed by: INTERNAL MEDICINE

## 2017-01-29 PROCEDURE — 80053 COMPREHEN METABOLIC PANEL: CPT

## 2017-01-29 PROCEDURE — 63600175 PHARM REV CODE 636 W HCPCS: Performed by: INTERNAL MEDICINE

## 2017-01-29 PROCEDURE — 94640 AIRWAY INHALATION TREATMENT: CPT

## 2017-01-29 PROCEDURE — 94761 N-INVAS EAR/PLS OXIMETRY MLT: CPT

## 2017-01-29 PROCEDURE — 25000003 PHARM REV CODE 250: Performed by: STUDENT IN AN ORGANIZED HEALTH CARE EDUCATION/TRAINING PROGRAM

## 2017-01-29 PROCEDURE — 11000001 HC ACUTE MED/SURG PRIVATE ROOM

## 2017-01-29 RX ORDER — MAGNESIUM SULFATE HEPTAHYDRATE 40 MG/ML
2 INJECTION, SOLUTION INTRAVENOUS ONCE
Status: COMPLETED | OUTPATIENT
Start: 2017-01-29 | End: 2017-01-29

## 2017-01-29 RX ADMIN — FLUTICASONE FUROATE AND VILANTEROL TRIFENATATE 1 PUFF: 100; 25 POWDER RESPIRATORY (INHALATION) at 08:01

## 2017-01-29 RX ADMIN — IPRATROPIUM BROMIDE AND ALBUTEROL SULFATE 3 ML: .5; 3 SOLUTION RESPIRATORY (INHALATION) at 04:01

## 2017-01-29 RX ADMIN — IPRATROPIUM BROMIDE AND ALBUTEROL SULFATE 3 ML: .5; 3 SOLUTION RESPIRATORY (INHALATION) at 03:01

## 2017-01-29 RX ADMIN — CHLORHEXIDINE GLUCONATE 15 ML: 1.2 RINSE ORAL at 08:01

## 2017-01-29 RX ADMIN — INSULIN ASPART 4 UNITS: 100 INJECTION, SOLUTION INTRAVENOUS; SUBCUTANEOUS at 12:01

## 2017-01-29 RX ADMIN — IPRATROPIUM BROMIDE AND ALBUTEROL SULFATE 3 ML: .5; 3 SOLUTION RESPIRATORY (INHALATION) at 11:01

## 2017-01-29 RX ADMIN — ATORVASTATIN CALCIUM 80 MG: 40 TABLET, FILM COATED ORAL at 08:01

## 2017-01-29 RX ADMIN — HEPARIN SODIUM 5000 UNITS: 5000 INJECTION, SOLUTION INTRAVENOUS; SUBCUTANEOUS at 08:01

## 2017-01-29 RX ADMIN — INSULIN ASPART 4 UNITS: 100 INJECTION, SOLUTION INTRAVENOUS; SUBCUTANEOUS at 06:01

## 2017-01-29 RX ADMIN — MAGNESIUM SULFATE IN WATER 2 G: 40 INJECTION, SOLUTION INTRAVENOUS at 08:01

## 2017-01-29 RX ADMIN — INSULIN ASPART 2 UNITS: 100 INJECTION, SOLUTION INTRAVENOUS; SUBCUTANEOUS at 08:01

## 2017-01-29 RX ADMIN — CHLORHEXIDINE GLUCONATE 15 ML: 1.2 RINSE ORAL at 11:01

## 2017-01-29 RX ADMIN — PENTOXIFYLLINE 400 MG: 400 TABLET, FILM COATED, EXTENDED RELEASE ORAL at 08:01

## 2017-01-29 RX ADMIN — PENTOXIFYLLINE 400 MG: 400 TABLET, FILM COATED, EXTENDED RELEASE ORAL at 05:01

## 2017-01-29 RX ADMIN — PREDNISONE 50 MG: 10 TABLET ORAL at 08:01

## 2017-01-29 RX ADMIN — MOXIFLOXACIN HYDROCHLORIDE 400 MG: 400 INJECTION, SOLUTION INTRAVENOUS at 11:01

## 2017-01-29 RX ADMIN — DONEPEZIL HYDROCHLORIDE 5 MG: 5 TABLET ORAL at 08:01

## 2017-01-29 RX ADMIN — IPRATROPIUM BROMIDE AND ALBUTEROL SULFATE 3 ML: .5; 3 SOLUTION RESPIRATORY (INHALATION) at 08:01

## 2017-01-29 NOTE — PLAN OF CARE
Problem: Patient Care Overview  Goal: Plan of Care Review  Outcome: Ongoing (interventions implemented as appropriate)  Monitor SpO2, adjust oxygen to keep saturation greater than or equal to 88%. Encourage periodic deep breathing.

## 2017-01-29 NOTE — PLAN OF CARE
Seen by SOHEILA,Sat at bedside.Desaturated to 88% on RA during  Activity.Placed back on 2 L o 2 NC, pox-92%. Able to turn self in bed when asked to do so. Hob elevated . Appetite good. Right IJ trialysis with sideport in place. Voided post jones removal

## 2017-01-29 NOTE — PROGRESS NOTES
[facility] IM Resident HO-3 Progress Note    Subjective:      The patient had no acute events overnight.  Her jones was removed this morning.  She is beginning to have some elevated blood pressures.     Objective:   Last 24 Hour Vital Signs:  BP  Min: 119/60  Max: 162/85  Temp  Av.1 °F (36.7 °C)  Min: 96.9 °F (36.1 °C)  Max: 98.7 °F (37.1 °C)  Pulse  Av.7  Min: 81  Max: 100  Resp  Av.7  Min: 14  Max: 37  SpO2  Av.6 %  Min: 92 %  Max: 99 %  I/O last 3 completed shifts:  In: 945 [P.O.:580; I.V.:115; IV Piggyback:250]  Out: 4130 [Urine:4130]    Physical Examination:  Gen: Patient awake, alert, in no apparent distress  HEENT: PERRLA, NC in place  Neck: Unable to appreicated jvp 2/2 patient's body habitus  CV: Distant heart sounds, unable to appreciate murmurs  Resp: End exp wheezes heard throughout, decreased air movement at bases bilaterally  Abd: Full, soft, non tender, non distended, old scar +BS  Ext: 1+ pitting edema, dry flaky skin   Skin: Dry, flaky, peeling over lower extremities  Neuro: awake. Alert, following commands     Laboratory:  Laboratory Data Reviewed: yes  Pertinent Findings:  Recent Labs      17   0439  17   0530  17   0436   WBC  14.38*  13.75*  11.07   HGB  10.4*  10.7*  11.9*   HCT  33.0*  34.7*  39.1   PLT  337  284  317   MCV  96  96  98   RDW  14.9*  14.7*  14.8*   GRAN  78.9*  11.4*  74.0*  10.2*  84.9*  9.4*   LYMPH  15.8*  2.3  20.2  2.8  11.7*  1.3   MONO  4.8  0.7  5.0  0.7  2.2*  0.2*   EOS  0.0  0.0  0.0   BASO  0.01  0.00  0.01   EOSINOPHIL  0.0  0.1  0.0   BASOPHIL  0.1  0.0  0.1     Recent Labs      17   2201  17   0439  17   0530  17   0436   NA  136  136  136  142  140   K  5.0  4.4  4.4  4.5  5.0   CL  96  97  97  100  98   CO2  31*  31*  31*  34*  32*   BUN  75*  72*  72*  59*  49*   CREATININE  5.7*  5.0*  5.0*  3.1*  2.3*     Recent Labs      17   0832  17   1112  17   1635  17   2046   01/29/17   0544   POCTGLUCOSE  105  175*  225*  162*  154*     Recent Labs      01/26/17   2201  01/27/17   0439  01/28/17   0530  01/29/17   0436   PROT   --   7.1  7.4  8.1   ALBUMIN  2.0*  2.1*  2.1*  2.2*  2.4*   BILITOT   --   0.3  0.3  0.3   AST   --   12  12  13   ALT   --   12  13  14   ALKPHOS   --   94  88  90         Microbiology Data Reviewed: yes  Pertinent Findings:  No new    Other Results:  EKG (my interpretation): no new    Radiology Data Reviewed: yes  Pertinent Findings:  No new    Current Medications:     Infusions:        Scheduled:   albuterol-ipratropium 2.5mg-0.5mg/3mL  3 mL Nebulization Q4H    atorvastatin  80 mg Oral Daily    chlorhexidine  15 mL Mouth/Throat BID    donepezil  5 mg Oral QHS    fluticasone-vilanterol  1 puff Inhalation Daily    heparin (porcine)  5,000 Units Subcutaneous Q12H    moxifloxacin  400 mg Intravenous Q24H    pentoxifylline  400 mg Oral BID WM    predniSONE  50 mg Oral Daily        PRN:  dextrose 50%, dextrose 50%, glucagon (human recombinant), glucose, glucose, heparin (porcine), insulin aspart    Antibiotics and Day Number of Therapy:    moxifloxacin started 1/27/16 but was covered with broad spectrum since 1/25/17  Lines and Day Number of Therapy:  Trialysis cath    Assessment:     Adela Jessica is a 76 y.o.female with  Patient Active Problem List    Diagnosis Date Noted    Hypomagnesemia 01/28/2017    Physical deconditioning 01/28/2017    Pulmonary edema with congestive heart failure 01/27/2017    Streptococcal pneumonia 01/27/2017    Centrilobular emphysema 01/26/2017    SUBHASH (acute kidney injury) 01/25/2017    Morbid obesity due to excess calories 01/25/2017    Encephalopathy acute 01/25/2017    Uremia 01/25/2017    Tinea pedis of both feet 01/25/2017    Normocytic anemia 01/25/2017    Severe sepsis with acute organ dysfunction 01/25/2017    Alzheimer's dementia without behavioral disturbance 01/25/2017    Hyponatremia 01/25/2017     "Shock 01/25/2017    Hyperkalemia 11/11/2013    CKD (chronic kidney disease) stage 4, GFR 15-29 ml/min 11/11/2013    Leukocytosis 11/11/2013    Type 2 diabetes mellitus with complication, with long-term current use of insulin 11/11/2013    COPD (chronic obstructive pulmonary disease) 11/11/2013    Essential hypertension 11/11/2013    Mixed hyperlipidemia 11/11/2013    Troponin I above reference range 11/11/2013    Acute on chronic respiratory failure with hypoxia and hypercapnia 11/10/2013        Plan:   76 yr old female w/ pmhx of ckd IV, DM, copd, HTN, HPL, HIWOT was transferred from OSH for "copd exacerbation and need for dialysis".      Neuro      Encephalopathy  - likely 2/2 metabolic causes such as hypoxia and hypercapneia could also be septic in nature vs cva vs toxidrome vs uremia vs dementia  -CT head ordered but not done due to patient's critical status  -patient was only minimally responsive on arrival  -patient placed on precedex, fentanyl, proprofol-now off  -no focal deficits so no need for MRI  -resolved      CV      Shock  -most likely from multiple etiologies septic vs cardiogenic vs hypovolemic  -patient was hypotensive on arrival with pressures only dopplerable  -fluids wide open and patient placed on levophed for which was weaned off of  -continue to hold bp meds  -was on broad spectrum but deescalated to moxi 1/27/17  -Cardiology was consulted and are signing off as they believe her heart is fine  -resolved; patient is off of pressors  -removed kenneth    Hypertension  -were holding patient's home bp med 2/2 hypotension; will consider adding norvasc 5 if patient's bp continue to remain elevated  -will continue to hold acei 2/2 patient's acute on chronic kidney injury     Acute systolic heart Failure (HFrEF)  -EF mildly depressed  -will ensure patient is on appropriate meds at discharge  -will hold antihypertensives given patient's hypotension      Intermediate troponin  -troponin was " intermediate at OSH; patient's troponin leveled off at ~0.9; stopped trending  -ekg without signs of ischemia did involve cards because we believe the QRS had widened and were concerned about LBBB; cardiology has signed off as they have confirmed this is NOT LBBB  -echo ordered and results as above  -osh with cardiomegaly on cxr  -patient on Pentoxyphylline for PAD? Patient on statin will continue      Resp      Acute Hypercapneic and hypoxic respiratory failure  -patient was non-responsive with abg of ~ 7.1/85  -patient was intubated shortly after the gas results  -improving acidosis and hypercapneia  -pulmonary consulted for help with the vent  -last gas 7.325/59/98/30  -patient extubated 1/26/17 to bipap; weaned to 3L NC; sating well on 3 L NC      copd exacerbation   - scheduled nebs and steroids  -improved      FEN/GI      Hyponatremia And hyperkalemia  -patient s/p 1 L NS  -patient NPO  -patient was hypovolemic hyponatremia at 129; improved to wnl  -patient was hyperkalemic at 5.4 at osh. Patient not shifted so insulin, d50, kayaxalate  -hyperkalemia initially worsened and was shifted several more times prior to CRRT initiated  -patient without ekg changes  -hyperkalemia resolved  -protonix for gi ppx; which will be discontinued if patient approved for an oral diet  -resolved    Hypomagnesemia  -repleted; will continue to monitor      Heme/ID      SEPSIS possible septic shock 2/2 step pyogenes  -patient meet sepsis criteria from OSH based on heart rate, respirations and wbc count  -patient advanced to severe sepsis on arrival; has so far been responsive to fluids; advanced to requiring pressors  -trilaysis cath placed  -patient on vanc, zosyn, cipro patient was pancultured  -strep pyogenes from sputum culture ngtd on urine or blood;deescalated to moxifloxacin on 1/27/17; lactate negative  -procalcitonin weakly positive          AOCD  -new studies ordered and with low-normal iron and elevated  ferritin      AGMA  -however patient probably chronic co2 retainer so this is probably acidotic for patient  -lactate negative likely 2/2 renal failure  -resolved      Thrombocytosis  -likely reactive to infectious process  -will continue to monitor  -resolved      nephro      Acute Renal Failure with hyperkalemia  -trialysis catheter placed; patient started on crrt  -patient is SUBHASH on CKDIV likely advanced to CKDV  -renal on board; appreicate recs  -hyperkalemia treated as above and resolved after addition of crrt  -FENA 11.5%  -awaiting renal recs but likely no more need for dialysis  -improving  -will continue to monitor and f/u nephrology recs      Endocrine      Diabetes with neuropathy  -A1c 7.5  -patient placed on insulin gtt with q 1 blood sugars goal [phone] to sliding scale as patient was started on oral diet  -patient on metformin and glipizide +/- insulin at home will hold while hospitalized  -will hold off on lyrica until renal function recovers      Psych      HIstory of dementia and depression  -patient with history of dementia on donepezil; will restart  -patient on cumbalta, lyrica; will restart when renal function recovers      Ppx: Hep       Diet: dental soft renal with thin liquids      Dispo: awaiting renal recovery and renal recs          Hawa Blue  [facility] Internal Medicine HO-3  U IM Service Team A    [facility] Medicine Hospitalist Pager numbers:   [facility] Hospitalist Medicine Team A (Izabela/Brisa): [phone]  [facility] Hospitalist Medicine Team B (Bart/Benjamin):  [phone]

## 2017-01-29 NOTE — PLAN OF CARE
Problem: Patient Care Overview  Goal: Plan of Care Review  Outcome: Ongoing (interventions implemented as appropriate)  Pt received on RA.  SPO2  90%.  Pt in no apparent respiratory distress.  Will continue to monitor.

## 2017-01-29 NOTE — PLAN OF CARE
Problem: Patient Care Overview  Goal: Individualization & Mutuality  Plan of care reviewed with patient and family. Verbalizes understanding. Continued with current plan of care Monitoring :VS, Labs,I/O, Dialysis tx , P.T.O2 sats , Safety, telemetry Wt daily.Encourage ADL as much as condition permits

## 2017-01-29 NOTE — PROGRESS NOTES
Progress Note  Nephrology    Admit Date: 1/25/2017   LOS: 4 days     SUBJECTIVE:     Follow-up For:  SUBHASH  Review of Systems:  Constitutional: no fever or chills  Respiratory: no cough or shortness of breath  Cardiovascular: no chest pain or palpitations  Gastrointestinal: no nausea or vomiting, no abdominal pain or change in bowel habits    OBJECTIVE:     Vital Signs (Most Recent)  Temp: 98.2 °F (36.8 °C) (01/29/17 0800)  Pulse: 100 (01/29/17 1149)  Resp: 18 (01/29/17 1149)  BP: 139/65 (01/29/17 0800)  SpO2: (!) 91 % (01/29/17 1149)    Vital Signs Range (Last 24H):  Temp:  [96.9 °F (36.1 °C)-98.3 °F (36.8 °C)]   Pulse:  []   Resp:  [16-24]   BP: (131-162)/(60-85)   SpO2:  [90 %-97 %]        Physical Exam:   General: well developed, well nourished  Lungs:  clear to auscultation bilaterally and normal respiratory effort  Cardiovascular: Heart: regular rate and rhythm, S1, S2 normal, no murmur, click, rub or gallop. Chest Wall: no tenderness. Extremities: no cyanosis or edema, or clubbing. Pulses: 2+ and symmetric.  Abdomen/Rectal: Abdomen: soft, non-tender non-distented; bowel sounds normal; no masses,  no organomegaly. Rectal: not examined    Laboratory:  CBC:   Recent Labs  Lab 01/29/17  0436   WBC 11.07   RBC 3.98*   HGB 11.9*   HCT 39.1      MCV 98   MCH 29.9   MCHC 30.4*     CMP:   Recent Labs  Lab 01/29/17  0436   *   CALCIUM 9.6   ALBUMIN 2.4*   PROT 8.1      K 5.0   CO2 32*   CL 98   BUN 49*   CREATININE 2.3*   ALKPHOS 90   ALT 14   AST 13   BILITOT 0.3       Diagnostic Results:  Labs: Reviewed    ASSESSMENT/PLAN:     Active Hospital Problems    Diagnosis  POA    *Shock [R57.9]  Yes    Hypomagnesemia [E83.42]  Yes    Physical deconditioning [R53.81]  Yes    Pulmonary edema with congestive heart failure [I50.1]  Yes    Streptococcal pneumonia [J15.4]  Yes    Centrilobular emphysema [J43.2]  Yes    SUBHASH (acute kidney injury) [N17.9]  Yes    Morbid obesity due to excess calories  [E66.01]  Yes    Encephalopathy acute [G93.40]  Yes    Uremia [N19]  Yes    Tinea pedis of both feet [B35.3]  Yes    Normocytic anemia [D64.9]  Yes    Severe sepsis with acute organ dysfunction [A41.9, R65.20]  Yes    Alzheimer's dementia without behavioral disturbance [G30.9, F02.80]  Yes    Hyponatremia [E87.1]  Yes    Hyperkalemia [E87.5]  Yes    Leukocytosis [D72.829]  Yes    COPD (chronic obstructive pulmonary disease) [J44.9]  Yes    Type 2 diabetes mellitus with complication, with long-term current use of insulin [E11.8, Z79.4]  Yes    CKD (chronic kidney disease) stage 4, GFR 15-29 ml/min [N18.4]  Yes    Essential hypertension [I10]  Yes    Mixed hyperlipidemia [E78.2]  Yes    Troponin I above reference range [R79.89]  Yes    Acute on chronic respiratory failure with hypoxia and hypercapnia [J96.21, J96.22]  Yes      Resolved Hospital Problems    Diagnosis Date Resolved POA    Idiopathic hypotension [I95.0] 01/25/2017 Yes    Encephalopathy [G93.40] 01/25/2017 Yes    LBBB (left bundle branch block) [I44.7] 01/25/2017 Yes       A/P:    SUBHASH- resolving with ongoing improved labs.  Will d/c jones catheter and follow I/Os.  Monitor daily electrolytes.

## 2017-01-30 VITALS
TEMPERATURE: 98 F | SYSTOLIC BLOOD PRESSURE: 100 MMHG | WEIGHT: 293 LBS | DIASTOLIC BLOOD PRESSURE: 62 MMHG | OXYGEN SATURATION: 93 % | HEIGHT: 63 IN | BODY MASS INDEX: 51.91 KG/M2 | RESPIRATION RATE: 21 BRPM | HEART RATE: 89 BPM

## 2017-01-30 LAB
ALBUMIN SERPL BCP-MCNC: 2.5 G/DL
ALP SERPL-CCNC: 85 U/L
ALT SERPL W/O P-5'-P-CCNC: 18 U/L
ANION GAP SERPL CALC-SCNC: 9 MMOL/L
AST SERPL-CCNC: 21 U/L
BACTERIA BLD CULT: NORMAL
BACTERIA BLD CULT: NORMAL
BASOPHILS # BLD AUTO: 0.01 K/UL
BASOPHILS NFR BLD: 0.1 %
BILIRUB SERPL-MCNC: 0.3 MG/DL
BUN SERPL-MCNC: 44 MG/DL
CALCIUM SERPL-MCNC: 9.5 MG/DL
CHLORIDE SERPL-SCNC: 100 MMOL/L
CO2 SERPL-SCNC: 32 MMOL/L
CREAT SERPL-MCNC: 1.8 MG/DL
DIFFERENTIAL METHOD: ABNORMAL
EOSINOPHIL # BLD AUTO: 0 K/UL
EOSINOPHIL NFR BLD: 0.1 %
ERYTHROCYTE [DISTWIDTH] IN BLOOD BY AUTOMATED COUNT: 15 %
EST. GFR  (AFRICAN AMERICAN): 31 ML/MIN/1.73 M^2
EST. GFR  (NON AFRICAN AMERICAN): 27 ML/MIN/1.73 M^2
GLUCOSE SERPL-MCNC: 156 MG/DL
HCT VFR BLD AUTO: 40.1 %
HGB BLD-MCNC: 12.4 G/DL
LYMPHOCYTES # BLD AUTO: 2.3 K/UL
LYMPHOCYTES NFR BLD: 18.3 %
MAGNESIUM SERPL-MCNC: 1.7 MG/DL
MCH RBC QN AUTO: 30.5 PG
MCHC RBC AUTO-ENTMCNC: 30.9 %
MCV RBC AUTO: 99 FL
MONOCYTES # BLD AUTO: 0.9 K/UL
MONOCYTES NFR BLD: 7 %
NEUTROPHILS # BLD AUTO: 9.3 K/UL
NEUTROPHILS NFR BLD: 73.7 %
PLATELET # BLD AUTO: 287 K/UL
PMV BLD AUTO: 9.3 FL
POCT GLUCOSE: 142 MG/DL (ref 70–110)
POCT GLUCOSE: 154 MG/DL (ref 70–110)
POTASSIUM SERPL-SCNC: 4.4 MMOL/L
PROT SERPL-MCNC: 7.9 G/DL
RBC # BLD AUTO: 4.07 M/UL
SODIUM SERPL-SCNC: 141 MMOL/L
WBC # BLD AUTO: 12.6 K/UL

## 2017-01-30 PROCEDURE — 25000003 PHARM REV CODE 250: Performed by: INTERNAL MEDICINE

## 2017-01-30 PROCEDURE — 94640 AIRWAY INHALATION TREATMENT: CPT

## 2017-01-30 PROCEDURE — 63600175 PHARM REV CODE 636 W HCPCS: Performed by: STUDENT IN AN ORGANIZED HEALTH CARE EDUCATION/TRAINING PROGRAM

## 2017-01-30 PROCEDURE — 25000003 PHARM REV CODE 250: Performed by: STUDENT IN AN ORGANIZED HEALTH CARE EDUCATION/TRAINING PROGRAM

## 2017-01-30 PROCEDURE — 80053 COMPREHEN METABOLIC PANEL: CPT

## 2017-01-30 PROCEDURE — 85025 COMPLETE CBC W/AUTO DIFF WBC: CPT

## 2017-01-30 PROCEDURE — 83735 ASSAY OF MAGNESIUM: CPT

## 2017-01-30 PROCEDURE — 63600175 PHARM REV CODE 636 W HCPCS: Performed by: INTERNAL MEDICINE

## 2017-01-30 PROCEDURE — 90472 IMMUNIZATION ADMIN EACH ADD: CPT | Performed by: INTERNAL MEDICINE

## 2017-01-30 PROCEDURE — 3E0234Z INTRODUCTION OF SERUM, TOXOID AND VACCINE INTO MUSCLE, PERCUTANEOUS APPROACH: ICD-10-PCS | Performed by: INTERNAL MEDICINE

## 2017-01-30 PROCEDURE — 92526 ORAL FUNCTION THERAPY: CPT

## 2017-01-30 PROCEDURE — G8998 SWALLOW D/C STATUS: HCPCS | Mod: CI

## 2017-01-30 PROCEDURE — 97116 GAIT TRAINING THERAPY: CPT

## 2017-01-30 PROCEDURE — 97110 THERAPEUTIC EXERCISES: CPT

## 2017-01-30 PROCEDURE — 90471 IMMUNIZATION ADMIN: CPT | Performed by: INTERNAL MEDICINE

## 2017-01-30 PROCEDURE — 25000242 PHARM REV CODE 250 ALT 637 W/ HCPCS: Performed by: STUDENT IN AN ORGANIZED HEALTH CARE EDUCATION/TRAINING PROGRAM

## 2017-01-30 PROCEDURE — 90670 PCV13 VACCINE IM: CPT | Performed by: INTERNAL MEDICINE

## 2017-01-30 PROCEDURE — 25000003 PHARM REV CODE 250

## 2017-01-30 PROCEDURE — 36415 COLL VENOUS BLD VENIPUNCTURE: CPT

## 2017-01-30 PROCEDURE — 94761 N-INVAS EAR/PLS OXIMETRY MLT: CPT

## 2017-01-30 PROCEDURE — G8997 SWALLOW GOAL STATUS: HCPCS | Mod: CI

## 2017-01-30 PROCEDURE — 90662 IIV NO PRSV INCREASED AG IM: CPT | Performed by: INTERNAL MEDICINE

## 2017-01-30 PROCEDURE — 86580 TB INTRADERMAL TEST: CPT | Performed by: STUDENT IN AN ORGANIZED HEALTH CARE EDUCATION/TRAINING PROGRAM

## 2017-01-30 RX ORDER — ATORVASTATIN CALCIUM 40 MG/1
40 TABLET, FILM COATED ORAL DAILY
Qty: 90 TABLET | Refills: 3 | Status: ON HOLD | OUTPATIENT
Start: 2017-01-30 | End: 2018-09-13 | Stop reason: HOSPADM

## 2017-01-30 RX ORDER — CARVEDILOL 3.12 MG/1
3.12 TABLET ORAL 2 TIMES DAILY
Qty: 60 TABLET | Refills: 11 | Status: SHIPPED | OUTPATIENT
Start: 2017-01-30 | End: 2017-01-30 | Stop reason: HOSPADM

## 2017-01-30 RX ORDER — NAPROXEN SODIUM 220 MG/1
81 TABLET, FILM COATED ORAL DAILY
Status: DISCONTINUED | OUTPATIENT
Start: 2017-01-30 | End: 2017-01-30 | Stop reason: HOSPADM

## 2017-01-30 RX ORDER — GLIPIZIDE 5 MG/1
2.5 TABLET ORAL
Qty: 15 TABLET | Refills: 1 | Status: SHIPPED | OUTPATIENT
Start: 2017-01-30

## 2017-01-30 RX ORDER — MAGNESIUM SULFATE HEPTAHYDRATE 40 MG/ML
2 INJECTION, SOLUTION INTRAVENOUS ONCE
Status: COMPLETED | OUTPATIENT
Start: 2017-01-30 | End: 2017-01-30

## 2017-01-30 RX ORDER — AMLODIPINE BESYLATE 5 MG/1
5 TABLET ORAL DAILY
Qty: 30 TABLET | Refills: 11 | Status: SHIPPED | OUTPATIENT
Start: 2017-01-30 | End: 2018-09-28

## 2017-01-30 RX ORDER — MOXIFLOXACIN HYDROCHLORIDE 400 MG/1
400 TABLET ORAL DAILY
Qty: 5 TABLET | Refills: 0 | Status: SHIPPED | OUTPATIENT
Start: 2017-01-30 | End: 2017-02-04

## 2017-01-30 RX ORDER — CARVEDILOL 3.12 MG/1
3.12 TABLET ORAL 2 TIMES DAILY
Status: DISCONTINUED | OUTPATIENT
Start: 2017-01-30 | End: 2017-01-30 | Stop reason: HOSPADM

## 2017-01-30 RX ORDER — DULOXETIN HYDROCHLORIDE 60 MG/1
60 CAPSULE, DELAYED RELEASE ORAL DAILY
Qty: 30 CAPSULE | Refills: 2
Start: 2017-01-30

## 2017-01-30 RX ADMIN — PNEUMOCOCCAL 13-VALENT CONJUGATE VACCINE 0.5 ML: 2.2; 2.2; 2.2; 2.2; 2.2; 4.4; 2.2; 2.2; 2.2; 2.2; 2.2; 2.2; 2.2 INJECTION, SUSPENSION INTRAMUSCULAR at 01:01

## 2017-01-30 RX ADMIN — INFLUENZA A VIRUS A/CALIFORNIA/7/2009 X-179A (H1N1) ANTIGEN (FORMALDEHYDE INACTIVATED), INFLUENZA A VIRUS A/HONG KONG/4801/2014 X-263B (H3N2) ANTIGEN (FORMALDEHYDE INACTIVATED), AND INFLUENZA B VIRUS B/BRISBANE/60/2008 ANTIGEN (FORMALDEHYDE INACTIVATED) 0.5 ML: 60; 60; 60 INJECTION, SUSPENSION INTRAMUSCULAR at 01:01

## 2017-01-30 RX ADMIN — IPRATROPIUM BROMIDE AND ALBUTEROL SULFATE 3 ML: .5; 3 SOLUTION RESPIRATORY (INHALATION) at 12:01

## 2017-01-30 RX ADMIN — MOXIFLOXACIN HYDROCHLORIDE 400 MG: 400 INJECTION, SOLUTION INTRAVENOUS at 12:01

## 2017-01-30 RX ADMIN — INSULIN ASPART 2 UNITS: 100 INJECTION, SOLUTION INTRAVENOUS; SUBCUTANEOUS at 08:01

## 2017-01-30 RX ADMIN — TUBERCULIN PURIFIED PROTEIN DERIVATIVE 5 UNITS: 5 INJECTION, SOLUTION INTRADERMAL at 08:01

## 2017-01-30 RX ADMIN — CHLORHEXIDINE GLUCONATE 15 ML: 1.2 RINSE ORAL at 08:01

## 2017-01-30 RX ADMIN — PREDNISONE 50 MG: 10 TABLET ORAL at 08:01

## 2017-01-30 RX ADMIN — IPRATROPIUM BROMIDE AND ALBUTEROL SULFATE 3 ML: .5; 3 SOLUTION RESPIRATORY (INHALATION) at 07:01

## 2017-01-30 RX ADMIN — PENTOXIFYLLINE 400 MG: 400 TABLET, FILM COATED, EXTENDED RELEASE ORAL at 08:01

## 2017-01-30 RX ADMIN — MAGNESIUM SULFATE IN WATER 2 G: 40 INJECTION, SOLUTION INTRAVENOUS at 08:01

## 2017-01-30 RX ADMIN — CARVEDILOL 3.12 MG: 3.12 TABLET, FILM COATED ORAL at 08:01

## 2017-01-30 RX ADMIN — IPRATROPIUM BROMIDE AND ALBUTEROL SULFATE 3 ML: .5; 3 SOLUTION RESPIRATORY (INHALATION) at 04:01

## 2017-01-30 RX ADMIN — IPRATROPIUM BROMIDE AND ALBUTEROL SULFATE 3 ML: .5; 3 SOLUTION RESPIRATORY (INHALATION) at 11:01

## 2017-01-30 RX ADMIN — HEPARIN SODIUM 5000 UNITS: 5000 INJECTION, SOLUTION INTRAVENOUS; SUBCUTANEOUS at 08:01

## 2017-01-30 RX ADMIN — ATORVASTATIN CALCIUM 80 MG: 40 TABLET, FILM COATED ORAL at 08:01

## 2017-01-30 RX ADMIN — FLUTICASONE FUROATE AND VILANTEROL TRIFENATATE 1 PUFF: 100; 25 POWDER RESPIRATORY (INHALATION) at 08:01

## 2017-01-30 RX ADMIN — ASPIRIN 81 MG 81 MG: 81 TABLET ORAL at 08:01

## 2017-01-30 NOTE — PROGRESS NOTES
PT worked with patient. New recommendatons are home health and patient and nephew agree. No preference for home health agency.     Faxed heavy duty rolling walker to [facility].    Lety Mckeon RN, CCM, CMSRN  RN Transition Navigator  [phone]

## 2017-01-30 NOTE — PT/OT/SLP PROGRESS
Physical Therapy  Treatment    Adela Jessica   MRN: [id number]   Admitting Diagnosis: Shock    PT Received On: 17  PT Start Time: 1115     PT Stop Time: 1140    PT Total Time (min): 25 min       Billable Minutes:  Gait [id number] and Therapeutic Exercise 13    Treatment Type: Treatment  PT/PTA: PTA     PTA Visit Number: 1       General Precautions: Standard, aspiration, fall, respiratory  Orthopedic Precautions: N/A   Braces:      Do you have any cultural, spiritual, Zoroastrianism conflicts, given your current situation?: no    Subjective:  Communicated with RN (Gabriela) prior to session.  Pt agreed to tx.    Pain Ratin/10              Pain Rating Post-Intervention: 0/10    Objective:   Patient found with: peripheral IV, telemetry    Functional Mobility:  Bed Mobility:   Rolling/Turning Right: Modified independent  Scooting/Bridging: Modified Independent (to EOB)  Supine to Sit: Modified Independent, Supervision (bed flat x 2 reps)  Sit to Supine: Modified Independent, Supervision (bed flat x 2 reps)    Transfers:  Sit <> Stand Assistance: Contact Guard Assistance, Stand By Assistance ([facility] baractric RW and vc's for hand placement)  Sit <> Stand Assistive Device: Rolling Walker    Gait:   Gait Distance: 100ft [facility] BRW and SBA with assist for IV pole.  Assistance 1: Contact Guard Assistance  Gait Assistive Device: Rolling walker  Gait Pattern: reciprocal  Gait Deviation(s): decreased cheryl, decreased velocity of limb motion, decreased step length, decreased stride length, decreased toe-to-floor clearance    Stairs:    Balance:   Static Sit: GOOD: Takes MODERATE challenges from all directions  Dynamic Sit: GOOD: Maintains balance through MODERATE excursions of active trunk movement  Static Stand: FAIR+: Takes MINIMAL challenges from all directions  Dynamic stand: FAIR+: Needs CLOSE SUPERVISION during gait and is able to right self with minor LOB     Therapeutic Activities and Exercises:  Supine <> Sit as  "above.  Gait: as above with BRW and no LOB.  VC's to pick feet up off floor more during swing phase.    Seated BLE therex: AP, LAQ, hip ABD/ADD, and glut sets x 15 reps.  Standing therex with BRW: marching in place x 15 reps with SBA.     AM-PAC 6 CLICK MOBILITY  How much help from another person does this patient currently need?   1 = Unable, Total/Dependent Assistance  2 = A lot, Maximum/Moderate Assistance  3 = A little, Minimum/Contact Guard/Supervision  4 = None, Modified [place]/Independent    Turning over in bed (including adjusting bedclothes, sheets and blankets)?: 4  Sitting down on and standing up from a chair with arms (e.g., wheelchair, bedside commode, etc.): 4  Moving from lying on back to sitting on the side of the bed?: 4  Moving to and from a bed to a chair (including a wheelchair)?: 3  Need to walk in hospital room?: 3  Climbing 3-5 steps with a railing?: 1  Total Score: 19    AM-PAC Raw Score CMS G-Code Modifier Level of Impairment Assistance   6 % Total / Unable   7 - 9 CM 80 - 100% Maximal Assist   10 - 14 CL 60 - 80% Moderate Assist   15 - 19 CK 40 - 60% Moderate Assist   20 - 22 CJ 20 - 40% Minimal Assist   23 CI 1-20% SBA / CGA   24 CH 0% Independent/ Mod I     Patient left supine with all lines intact, call button in reach, bed alarm on and RN notified.    Assessment:  Adela Jessica is a 76 y.o. female with a medical diagnosis of Shock and presents with an increase in ambulation distance today.  Pt did not demonstrate any LOB with steady gait.  Pt stated that she feels she is "walking about the same as before I came into the hospital."  "I just feel weaker."  No SOB noted.  Pt would  Continue to benefit from P.T. To regain strength and endurance levels back to PLOF and address impairments listed below. PTA spoke with SW about pt needing a baractric RW and home health P.T. Upon discharge.  PTA also spoke with Yamini Salvador P.T. In reference to these recommendations.    Rehab " identified problem list/impairments: Rehab identified problem list/impairments: weakness, impaired endurance, impaired self care skills, impaired functional mobilty, decreased ROM    Rehab potential is good.    Activity tolerance: Good    Discharge recommendations: Discharge Facility/Level Of Care Needs: home with home health, home health PT     Barriers to discharge: Barriers to Discharge: Inaccessible home environment, Decreased caregiver support    Equipment recommendations: Equipment Needed After Discharge:  (baractric RW)     GOALS:   Physical Therapy Goals        Problem: Physical Therapy Goal    Goal Priority Disciplines Outcome Goal Variances Interventions   Physical Therapy Goal     PT/OT, PT Ongoing (interventions implemented as appropriate)     Description:  Goals to be met by: 17     Patient will increase functional independence with mobility by performin. Supine to sit with SBA  2. Sit to supine with SBA  3. Sit to stand transfer with CGA-  Revised sit to stand with SBA  4. Bed to chair transfer with CGA using RW  Revised SPT with RW SBA   5.  Assess gait when able   REVISED amb with RW for 50' with SBA                  PLAN:    Patient to be seen 6 x/week  to address the above listed problems via gait training, therapeutic activities, therapeutic exercises  Plan of Care expires: 17  Plan of Care reviewed with: patient (urszula)         Dara Raúl, PTA  2017

## 2017-01-30 NOTE — DISCHARGE SUMMARY
[facility] Discharge Summary    Primary Team: [facility] Team A  Attending Physician: Laura Ledezma MD  Resident: Mirza  Intern: Lokesh    Date of Admit: 1/25/2017  Date of Discharge: 1/30/2017    Discharge to: home  Condition: stable    Discharge Diagnoses     Patient Active Problem List   Diagnosis    Acute on chronic respiratory failure with hypoxia and hypercapnia    Hyperkalemia    CKD (chronic kidney disease) stage 4, GFR 15-29 ml/min    Leukocytosis    Type 2 diabetes mellitus with complication, with long-term current use of insulin    COPD (chronic obstructive pulmonary disease)    Essential hypertension    Mixed hyperlipidemia    Troponin I above reference range    SUBHASH (acute kidney injury)    Morbid obesity due to excess calories    Encephalopathy acute    Uremia    Tinea pedis of both feet    Normocytic anemia    Severe sepsis with acute organ dysfunction    Alzheimer's dementia without behavioral disturbance    Hyponatremia    Shock    Centrilobular emphysema    Pulmonary edema with congestive heart failure    Streptococcal pneumonia    Hypomagnesemia    Physical deconditioning       Consultants and Procedures     Consultants:  Nephrology, cardiology, pulmonary    Procedures:       Brief History of Present Illness      76 yr old female w/ pmhx of ckd, dementia, diabetes type II, GERD, HLD, HTN, HIWOT presented to OSH with shortness of breath and confusion with frequent falls.    For the full HPI please refer to the History & Physical from this admission.    Hospital Course By Problem with Pertinent Findings       Encephalopathy  - likely 2/2 metabolic causes such as hypoxia and hypercapneia could also be septic in nature vs uremia vs dementia  -patient was only minimally responsive on arrival  -resolved after extubation  -no focal deficits so no need for MRI       Shock  -most likely from multiple etiologies septic vs cardiogenic vs hypovolemic  -patient was hypotensive on arrival with  pressures only dopplerable  -fluids wide open and patient placed on levophed for which was weaned off   -was on broad spectrum but deescalated to moxi 1/27/17 for which patient will receive a full 10 day course  -Cardiology consulted and had no additional recommendations on management.  Echo with normal ef      Hypertension  -were holding patient's home bp med 2/2 hypotension; started norvasc on discharge  -will continue to hold acei 2/2 patient's acute on chronic kidney injury  -continue asa as outpatient; started patient on statin        Intermediate troponin  -troponin was intermediate at OSH; patient's troponin leveled off at ~0.9; stopped trending  -likely a demand ischemia picture  -echo ordered and results wnl as above  -osh with cardiomegaly on cxr  -patient on Pentoxyphylline for PAD? Patient on statin will continue       Acute Hypercapneic and hypoxic respiratory failure  -patient was non-responsive with abg of ~ 7.1/85  -patient was intubated and pulmonary was consulted for vent management; patient was extubated a couple days later  -last gas 7.325/59/98/30  -patient extubated 1/26/17 to bipap; weaned to 3L NC; sating well on 3 L NC; patient has home oxygen and cpap machine to use at home; encouraged with compliance  -follow up with pulm as outpatient      copd exacerbation   - scheduled nebs and steroids  -finished steroids no need for additional steroids      Electrolyte abnormalities include Hyponatremia And hyperkalemia and hypmag  -repleted and wnl prior to discharge        SEPSIS possible septic shock 2/2 step pyogenes  -patient meet sepsis criteria from OSH based on heart rate, respirations and wbc count  -patient advanced to severe sepsis on arrival; has so far been responsive to fluids; advanced to requiring pressors  -trilaysis cath placed  -patient on vanc, zosyn, cipro patient was pancultured  -strep pyogenes from sputum culture ngtd on urine or blood;deescalated to moxifloxacin on 1/27/17;  lactate negative  -procalcitonin weakly positive      AOCD  -new studies ordered and with low-normal iron and elevated ferritin  -patient previously on iron; no need on discharge      AGMA  -however patient probably chronic co2 retainer so this is probably acidotic for patient  -lactate negative likely 2/2 renal failure  -resolved after dialysis      Thrombocytosis  -likely reactive to infectious process  -will continue to monitor  -resolved by discharge       Acute Renal Failure with hyperkalemia  -trialysis catheter placed; patient started on crrt   -patient is SUBHASH on CKDIV creatinine 1.8 at time of discharge  -follow up with dr. zavala as outpatient  -hyperkalemia resolved by discharge      Diabetes with neuropathy  -A1c 7.5  -patient placed on insulin gtt with q 1 blood sugars goal [phone] to sliding scale as patient was started on oral diet  -patient on metformin and glipizide +/- insulin will discharge home on renally dosed glipizide.  will hold insulin and metformin as renal function still recovering.  To be titrated by pcp  -lyrica started at reasonable renal dose  -patient advised to monitor blood glucoses and keep a log prior to following up with pcp    GERD  -hold omeprazole for recovery of renal function        HIstory of dementia and depression  -continue cymbalta, lyrica and donepizil    Deconditioning  -will send patient home with bariatric rolling walker and home health with home pt    Discharge Medications        Medication List      START taking these medications          amlodipine 5 MG tablet   Commonly known as:  NORVASC   Take 1 tablet (5 mg total) by mouth once daily.       atorvastatin 40 MG tablet   Commonly known as:  LIPITOR   Take 1 tablet (40 mg total) by mouth once daily.       moxifloxacin 400 mg tablet   Commonly known as:  AVELOX   Take 1 tablet (400 mg total) by mouth once daily.         CHANGE how you take these medications          glipiZIDE 5 MG tablet   Commonly known  as:  GLUCOTROL   Take 0.5 tablets (2.5 mg total) by mouth daily with breakfast.   What changed:  how much to take         CONTINUE taking these medications          albuterol 90 mcg/actuation inhaler   Commonly known as:  PROVENTIL/VENTOLIN   Inhale 2 puffs into the lungs every 6 (six) hours as needed for Wheezing.       aspirin 325 MG tablet       donepezil 5 MG tablet   Commonly known as:  ARICEPT       duloxetine 60 MG capsule   Commonly known as:  CYMBALTA   Take 1 capsule (60 mg total) by mouth once daily.       fish oil-omega-3 fatty acids 300-1,000 mg capsule       fluticasone-salmeterol 100-50 mcg/dose 100-50 mcg/dose diskus inhaler   Commonly known as:  ADVAIR       multivitamin per tablet   Commonly known as:  THERAGRAN       omega-3 acid ethyl esters 1 gram capsule   Commonly known as:  LOVAZA       pentoxifylline 400 mg Tbsr   Commonly known as:  TRENTAL       pregabalin 50 MG capsule   Commonly known as:  LYRICA         STOP taking these medications          albuterol sulfate 2.5 mg/0.5 mL Nebu       ferrous sulfate 325 mg (65 mg iron) Tab tablet       insulin detemir 100 unit/mL injection   Commonly known as:  LEVEMIR       lisinopril 5 MG tablet   Commonly known as:  PRINIVIL,ZESTRIL       metformin 500 MG tablet   Commonly known as:  GLUCOPHAGE       omeprazole 40 MG capsule   Commonly known as:  PRILOSEC       simvastatin 20 MG tablet   Commonly known as:  ZOCOR       triazolam 0.25 MG Tab   Commonly known as:  HALCION            Where to Get Your Medications      You can get these medications from any pharmacy     Bring a paper prescription for each of these medications     amlodipine 5 MG tablet    atorvastatin 40 MG tablet    glipiZIDE 5 MG tablet    moxifloxacin 400 mg tablet         Information about where to get these medications is not yet available     ! Ask your nurse or doctor about these medications     duloxetine 60 MG capsule             Discharge Information:   Diet:  Diabetic  renal diet    Physical Activity:  As tolerated    Instructions:  1. Take all medications as prescribed  2. Keep all follow-up appointments  3. Return to the hospital or call your primary care physicians if any worsening symptoms such as SOB, chest pain, confusion, shaking occur.      Follow-Up Appointments:  Follow up with PCP ASAP important for titration and insulin managementlucas within 1 week of discharge for management of noman and pulmonary    Hawa Blue  [facility] [place] [facility], -3

## 2017-01-30 NOTE — PLAN OF CARE
Problem: Physical Therapy Goal  Goal: Physical Therapy Goal  Goals to be met by: 17     Patient will increase functional independence with mobility by performin. Supine to sit with SBA  2. Sit to supine with SBA  3. Sit to stand transfer with CGA-  Revised sit to stand with SBA  4. Bed to chair transfer with CGA using RW  Revised SPT with RW SBA   5. Assess gait when able   REVISED amb with RW for 50 with SBA    Outcome: Ongoing (interventions implemented as appropriate)    Found lying in bed with no O2. sats 85% on RA-placed on 2 LPM per nurse Dee.  sats 95% on O2.  3 trails of standing EOB with RW.  Pt performed marching in place 10 times and had to sit to rest.  sats down to 88-89%.  Performed x 3 -unable to amb out of room.  Sidesteps to HOB.    REC:  TBD- pending progress, may need SNF as she has inaccessible environment and decreased CG.  Sister who  is at home during day unable to physically assist her . If pt  able to amb safely for  short distances to get to bathroom at home could go home with HH  DME:  TBD

## 2017-01-30 NOTE — NURSING
Nurse paged Brisa's team and informed them that GAVIN Hanna needs to be removed prior to discharge. Team will remove.

## 2017-01-30 NOTE — PROGRESS NOTES
Patient lives with sister. Was using home oxygen prn, CPAP and RW (PRN). Waiting on final PT/OT recommendations.    Patient's nephew at bedside.    Discussed SNF facilties and they would like the 2 facilities in [place]. [facility] and [facility]. Will notify SW.    Lety Mckeon RN, CCM, CMSRN  RN Transition Navigator  [phone]

## 2017-01-30 NOTE — PT/OT/SLP PROGRESS
Physical Therapy  Treatment    Adela Jessica   MRN: [id number]   Admitting Diagnosis: Shock    PT Received On: 17  PT Start Time: 1441     PT Stop Time: 1507    PT Total Time (min): 26 min       Billable Minutes:  Therapeutic Activity 14 and Therapeutic Exercise 8    Treatment Type: Treatment  PT/PTA: PT             General Precautions: Standard, aspiration, fall, respiratory Sats = or > 88%  Orthopedic Precautions: N/A   Braces:      Do you have any cultural, spiritual, Restorationist conflicts, given your current situation?: no    Subjective:  Communicated with nursing prior to session.      Pain Ratin/10              Pain Rating Post-Intervention: 0/10    Objective:   Patient found with: peripheral IV, telemetry    Functional Mobility:  Bed Mobility:   Supine to Sit: Minimum Assistance, With side rail (elevated HOB)  Sit to Supine: Moderate Assistance, With leg lift, With siderail    Transfers:  Sit <> Stand Assistance: Minimum Assistance  Sit <> Stand Assistive Device: Rolling Walker    Gait:   Gait Distance: sidesteps and marching in place.   Assistance 1: Contact Guard Assistance  Gait Assistive Device: Rolling walker    Stairs:  Not assessed    Balance:   Static Sit: GOOD-: Takes MODERATE challenges from all directions but inconsistently  Dynamic Sit: FAIR+: Maintains balance through MINIMAL excursions of active trunk motion  Static Stand: FAIR: Maintains without assist but unable to take challenges  Dynamic stand: FAIR: Needs CONTACT GUARD during gait     Therapeutic Activities and Exercises:   Found lying in bed with no O2. sats 85% on RA-placed on 2 LPM per nurse Dee.  sats 95% on O2.  3 trails of standing EOB with RW.  Pt performed marching in place 10 times and had to sit to rest.  sats down to 88-89%.  Performed x 3 -unable to amb out of room.  Sidesteps to HOB.  Instructed in breathing ex to increase sats after activity.  LE ex-hip flex, Ap and LAQ x 10 reps sitting EOB.     AM-PAC 6 CLICK  MOBILITY  How much help from another person does this patient currently need?   1 = Unable, Total/Dependent Assistance  2 = A lot, Maximum/Moderate Assistance  3 = A little, Minimum/Contact Guard/Supervision  4 = None, Modified Millard/Independent    Turning over in bed (including adjusting bedclothes, sheets and blankets)?: 3  Sitting down on and standing up from a chair with arms (e.g., wheelchair, bedside commode, etc.): 3  Moving from lying on back to sitting on the side of the bed?: 2  Moving to and from a bed to a chair (including a wheelchair)?: 2  Need to walk in hospital room?: 2  Climbing 3-5 steps with a railing?: 1  Total Score: 13    AM-PAC Raw Score CMS G-Code Modifier Level of Impairment Assistance   6 % Total / Unable   7 - 9 CM 80 - 100% Maximal Assist   10 - 14 CL 60 - 80% Moderate Assist   15 - 19 CK 40 - 60% Moderate Assist   20 - 22 CJ 20 - 40% Minimal Assist   23 CI 1-20% SBA / CGA   24 CH 0% Independent/ Mod I     Patient left HOB elevated with all lines intact, call button in reach, bed alarm on and nursing notified.    Assessment:  Adela Jessica is a 76 y.o. female with a medical diagnosis of Shock and presents with decreased functional mobility and endurance.  Pt in mid 80's on RA this pm, improves with O2 but desaturates with minimal activity .pt will need to be able to safely amb to bathroom without assist and get in and out of bed to go home with HH.      Rehab identified problem list/impairments: Rehab identified problem list/impairments: impaired cardiopulmonary response to activity, impaired endurance, weakness, impaired functional mobilty, gait instability, impaired balance, decreased ROM, impaired self care skills    Rehab potential is good.    Activity tolerance: Fair    Discharge recommendations: Discharge Facility/Level Of Care Needs:  (TBD-may need SNF )     Barriers to discharge: Barriers to Discharge: Inaccessible home environment, Decreased caregiver  support    Equipment recommendations: Equipment Needed After Discharge:  (TBD)     GOALS:   Physical Therapy Goals        Problem: Physical Therapy Goal    Goal Priority Disciplines Outcome Goal Variances Interventions   Physical Therapy Goal     PT/OT, PT Ongoing (interventions implemented as appropriate)     Description:  Goals to be met by: 17     Patient will increase functional independence with mobility by performin. Supine to sit with SBA  2. Sit to supine with SBA  3. Sit to stand transfer with CGA-  Revised sit to stand with SBA  4. Bed to chair transfer with CGA using RW  Revised SPT with RW SBA   5.  Assess gait when able   REVISED amb with RW for 50' with SBA                  PLAN:    Patient to be seen 6 x/week  to address the above listed problems via gait training, therapeutic activities, therapeutic exercises  Plan of Care expires: 17  Plan of Care reviewed with: patient         Nusrat KRUEGER Ervin, PT  2017

## 2017-01-30 NOTE — PLAN OF CARE
Problem: Physical Therapy Goal  Goal: Physical Therapy Goal  Goals to be met by: 17     Patient will increase functional independence with mobility by performin. Supine to sit with SBA  2. Sit to supine with SBA  3. Sit to stand transfer with CGA-  Revised sit to stand with SBA  4. Bed to chair transfer with CGA using RW  Revised SPT with RW SBA   5. Assess gait when able   REVISED amb with RW for 50 with SBA    Outcome: Ongoing (interventions implemented as appropriate)  Pt has met goals # 1,2,3, and 5.  Continue working toward remaining goals.

## 2017-01-30 NOTE — NURSING
Discharge instructions and education reviewed with pt. Reviewed follow up appts, new medications, diet, and importance of medication compliance. Allowed time for questions. Patient verbalized complete understanding.    PIV discontinued. Catheter tip intact. Secured with gauze and coban. Patient tolerated well. Telemetry monitor discontinued. No acute distress noted.     Pt off unit via wheelchair.

## 2017-01-30 NOTE — PT/OT/SLP PROGRESS
Occupational Therapy      Adela Jessica  MRN: [id number]    Patient not seen today secondary to Patient fatigue (sleeping). Will follow-up 1/30/2017.    Tammy Borrero OT  1/29/2017

## 2017-01-30 NOTE — PT/OT/SLP PROGRESS
Speech Language Pathology  Dysphagia Treatment  Discharge    Adela Jessica   MRN: [id number]   [id number]/[id number] A  Admitting Diagnosis: Shock     Diet recommendations: Solid Diet Level: Dental Soft Liquid Diet Level: Thin   · Feed only when awake/alert,   · HOB to 90 degrees,   · Small bites/sips,   · Alternating bites/sips and   · Remain upright 30 minutes post meal    SLP Treatment Date: 17  Speech Start Time: 1435     Speech Stop Time: 1450     Speech Total (min): 15 min       TREATMENT BILLABLE MINUTES:  Treatment Swallowing Dysfunction 15    Has the patient been evaluated by SLP for swallowing? : Yes  Keep patient NPO?: No   General Precautions: Standard, aspiration, fall, respiratory  Current Respiratory Status: nasal cannula       Subjective:  Pt awake and cooperative. Pt's  present in room.     Pain Ratin/10   Pain Rating Post-Intervention: 0/10    Objective:   Pt seen to assess diet tolerance and possible diet upgrade. Pt accepted multiple bites of peaches via tsp, sips of water via straw, and 1 bite of aime cracker. Pt presented with a cough s/p aime cracker trial and excess chewing. Pt edentulous.  No overt s/s of aspiration noted with dental soft trials and thin liquids. Pt demonstrated and verbalized understanding of safe swallowing precautions. Pt's  verbalized understanding. Recommend: continued dental soft diet, thin liquids, whole po medications, upright for all po intake and 30 minutes s/p po intake, small bites and sips, alternate bites and sips, check for oral pocketing/residue, slow rate. No further skilled ST services warranted at this time. Please reconsult as needed.     Assessment:  Adela eJssica is a 76 y.o. female with a medical diagnosis of Shock and presents with coughing s/p aime cracker trial.  No overt s/s of aspiration with dental soft or thin liquid trials. NO further skilled ST services warranted at this time. Please reconsult as needed.     Discharge  recommendations: Discharge Facility/Level Of Care Needs: home health PT, home with home health     Goals:   SLP Goals        Problem: SLP Goal    Goal Priority Disciplines Outcome   SLP Goal     SLP Ongoing (interventions implemented as appropriate)   Description:  Short Term Goals:   1. Pt will tolerate a dental soft diet and thin liquids with no overt s/s of aspiration.   2. Pt will demonstrate/verbalize 3 safe swallowing precautions with min cues.                Plan:   Plan of Care reviewed with: patient  SLP Follow-up?: No         SLP G-Codes  Functional Assessment Tool Used: noms  Score: 6  Functional Limitations: Swallowing  Swallow Goal Status (): CI  Swallow Discharge Status (): WENDIE Celestin, CCC-SLP  01/30/2017

## 2017-01-30 NOTE — PROGRESS NOTES
[facility] made phone contact with long term access services and completed a level of care eligibility tool (LOCET) for nursing facility admission.

## 2017-01-30 NOTE — PROGRESS NOTES
[facility] IM Resident HO-3 Progress Note    Subjective:      The patient had no acute events overnight.  She denies chest pain, sob.  She is eating and drinking well.     Objective:   Last 24 Hour Vital Signs:  BP  Min: 129/70  Max: 159/74  Temp  Av °F (36.7 °C)  Min: 97 °F (36.1 °C)  Max: 98.5 °F (36.9 °C)  Pulse  Av.8  Min: 89  Max: 103  Resp  Av.5  Min: 16  Max: 22  SpO2  Av.4 %  Min: 90 %  Max: 93 %  I/O last 3 completed shifts:  In: 780 [P.O.:480; I.V.:50; IV Piggyback:250]  Out: 2950 [Urine:2950]    Physical Examination:  Gen: Patient awake, alert, in no apparent distress  HEENT: JAIDA NC in place  Neck: Unable to appreicated jvp 2/2 patient's body habitus  CV: Distant heart sounds, unable to appreciate murmurs  Resp: End exp wheezes heard throughout, decreased air movement at bases bilaterally  Abd: Full, soft, non tender, non distended, old scar +BS  Ext: 1+ pitting edema, dry flaky skin   Skin: Dry, flaky, peeling over lower extremities  Neuro: awake. Alert, following commands     Laboratory:  Laboratory Data Reviewed: yes  Pertinent Findings:  Recent Labs      17   0530  17   0436  17   0504   WBC  13.75*  11.07  12.60   HGB  10.7*  11.9*  12.4   HCT  34.7*  39.1  40.1   PLT  284  317  287   MCV  96  98  99*   RDW  14.7*  14.8*  15.0*   GRAN  74.0*  10.2*  84.9*  9.4*  73.7*  9.3*   LYMPH  20.2  2.8  11.7*  1.3  18.3  2.3   MONO  5.0  0.7  2.2*  0.2*  7.0  0.9   EOS  0.0  0.0  0.0   BASO  0.00  0.01  0.01   EOSINOPHIL  0.1  0.0  0.1   BASOPHIL  0.0  0.1  0.1     Recent Labs      17   0530  17   0436  17   0504   NA  142  140  141   K  4.5  5.0  4.4   CL  100  98  100   CO2  34*  32*  32*   BUN  59*  49*  44*   CREATININE  3.1*  2.3*  1.8*     Recent Labs      17   1058  17   1159  17   1524  17   2045  17   0541   POCTGLUCOSE  192*  250*  207*  228*  154*     Recent Labs      17   0530  17   0436   01/30/17   0504   PROT  7.4  8.1  7.9   ALBUMIN  2.2*  2.4*  2.5*   BILITOT  0.3  0.3  0.3   AST  12  13  21   ALT  13  14  18   ALKPHOS  88  90  85         Microbiology Data Reviewed: yes  Pertinent Findings:  No new    Other Results:  EKG (my interpretation): no new    Radiology Data Reviewed: yes  Pertinent Findings:  No new    Current Medications:     Infusions:        Scheduled:   albuterol-ipratropium 2.5mg-0.5mg/3mL  3 mL Nebulization Q4H    atorvastatin  80 mg Oral Daily    chlorhexidine  15 mL Mouth/Throat BID    donepezil  5 mg Oral QHS    fluticasone-vilanterol  1 puff Inhalation Daily    heparin (porcine)  5,000 Units Subcutaneous Q12H    moxifloxacin  400 mg Intravenous Q24H    pentoxifylline  400 mg Oral BID WM    predniSONE  50 mg Oral Daily        PRN:  dextrose 50%, dextrose 50%, glucagon (human recombinant), glucose, glucose, heparin (porcine), insulin aspart    Antibiotics and Day Number of Therapy:  moxifloxacin started 1/27/16 but was covered with broad spectrum since 1/25/17    Lines and Day Number of Therapy:  trialysis cath    Assessment:     Adela Jessica is a 76 y.o.female with  Patient Active Problem List    Diagnosis Date Noted    Hypomagnesemia 01/28/2017    Physical deconditioning 01/28/2017    Pulmonary edema with congestive heart failure 01/27/2017    Streptococcal pneumonia 01/27/2017    Centrilobular emphysema 01/26/2017    SUBHASH (acute kidney injury) 01/25/2017    Morbid obesity due to excess calories 01/25/2017    Encephalopathy acute 01/25/2017    Uremia 01/25/2017    Tinea pedis of both feet 01/25/2017    Normocytic anemia 01/25/2017    Severe sepsis with acute organ dysfunction 01/25/2017    Alzheimer's dementia without behavioral disturbance 01/25/2017    Hyponatremia 01/25/2017    Shock 01/25/2017    Hyperkalemia 11/11/2013    CKD (chronic kidney disease) stage 4, GFR 15-29 ml/min 11/11/2013    Leukocytosis 11/11/2013    Type 2 diabetes mellitus with  "complication, with long-term current use of insulin 11/11/2013    COPD (chronic obstructive pulmonary disease) 11/11/2013    Essential hypertension 11/11/2013    Mixed hyperlipidemia 11/11/2013    Troponin I above reference range 11/11/2013    Acute on chronic respiratory failure with hypoxia and hypercapnia 11/10/2013        Plan:     76 yr old female w/ pmhx of ckd IV, DM, copd, HTN, HPL, HIWOT was transferred from OSH for "copd exacerbation and need for dialysis".      Neuro      Encephalopathy  - likely 2/2 metabolic causes such as hypoxia and hypercapneia could also be septic in nature vs cva vs toxidrome vs uremia vs dementia  -CT head ordered but not done due to patient's critical status  -patient was only minimally responsive on arrival  -patient placed on precedex, fentanyl, proprofol-now off  -no focal deficits so no need for MRI  -resolved      CV      Shock  -most likely from multiple etiologies septic vs cardiogenic vs hypovolemic  -patient was hypotensive on arrival with pressures only dopplerable  -fluids wide open and patient placed on levophed for which was weaned off of  -continue to hold bp meds  -was on broad spectrum but deescalated to moxi 1/27/17  -Cardiology was consulted and are signing off as they believe her heart is fine  -resolved; patient is off of pressors  -removed kenneth     Hypertension  -were holding patient's home bp med 2/2 hypotension; started coreg today.  -will continue to hold acei 2/2 patient's acute on chronic kidney injury      Acute systolic heart Failure (HFrEF)  -EF mildly depressed  -will ensure patient is on appropriate meds at discharge  -will hold antihypertensives given patient's hypotension      Intermediate troponin  -troponin was intermediate at OSH; patient's troponin leveled off at ~0.9; stopped trending  -ekg without signs of ischemia did involve cards because we believe the QRS had widened and were concerned about LBBB; cardiology has signed off as they " have confirmed this is NOT LBBB  -echo ordered and results as above  -osh with cardiomegaly on cxr  -patient on Pentoxyphylline for PAD? Patient on statin will continue      Resp      Acute Hypercapneic and hypoxic respiratory failure  -patient was non-responsive with abg of ~ 7.1/85  -patient was intubated shortly after the gas results  -improving acidosis and hypercapneia  -pulmonary consulted for help with the vent  -last gas 7.325/59/98/30  -patient extubated 1/26/17 to bipap; weaned to 3L NC; sating well on 3 L NC      copd exacerbation   - scheduled nebs and steroids  -improved      FEN/GI      Hyponatremia And hyperkalemia  -patient s/p 1 L NS  -patient NPO  -patient was hypovolemic hyponatremia at 129; improved to wnl  -patient was hyperkalemic at 5.4 at osh. Patient not shifted so insulin, d50, kayaxalate  -hyperkalemia initially worsened and was shifted several more times prior to CRRT initiated  -patient without ekg changes  -hyperkalemia resolved  -protonix for gi ppx; which will be discontinued if patient approved for an oral diet  -resolved     Hypomagnesemia  -repleted; will continue to monitor      Heme/ID      SEPSIS possible septic shock 2/2 step pyogenes  -patient meet sepsis criteria from OSH based on heart rate, respirations and wbc count  -patient advanced to severe sepsis on arrival; has so far been responsive to fluids; advanced to requiring pressors  -trilaysis cath placed  -patient on vanc, zosyn, cipro patient was pancultured  -strep pyogenes from sputum culture ngtd on urine or blood;deescalated to moxifloxacin on 1/27/17; lactate negative  -procalcitonin weakly positive          AOCD  -new studies ordered and with low-normal iron and elevated ferritin  -patient previously on iron; no need      AGMA  -however patient probably chronic co2 retainer so this is probably acidotic for patient  -lactate negative likely 2/2 renal failure  -resolved      Thrombocytosis  -likely reactive to  infectious process  -will continue to monitor  -resolved      nephro      Acute Renal Failure with hyperkalemia  -trialysis catheter placed; patient started on crrt  -patient is SUBHASH on CKDIV likely advanced to CKDV  -renal on board; appreicate recs  -hyperkalemia treated as above and resolved after addition of crrt  -FENA 11.5%  -awaiting renal recs but likely no more need for dialysis  -improving  -will continue to monitor and f/u nephrology recs      Endocrine      Diabetes with neuropathy  -A1c 7.5  -patient placed on insulin gtt with q 1 blood sugars goal [phone] to sliding scale as patient was started on oral diet  -patient on metformin and glipizide +/- insulin at home will hold while hospitalized-will clarify home med list prior to discharge  -will hold off on lyrica until renal function recovers      Psych      HIstory of dementia and depression  -patient with history of dementia on donepezil; will restart  -patient on cymbalta, lyrica; will restart when renal function recovers      Ppx: Hep       Diet: dental soft renal with thin liquids      Dispo: awaiting placement recs PT/OT, possible discharge today        Hawa Blue  [facility] Internal Medicine HO-3  U IM Service Team A    [facility] Medicine Hospitalist Pager numbers:   [facility] Hospitalist Medicine Team A (Izabela/Brisa): [phone]  [facility] Hospitalist Medicine Team B (Bart/Benjamin):  [phone]

## 2017-01-30 NOTE — PLAN OF CARE
Problem: Patient Care Overview  Goal: Plan of Care Review  Room air SpO2   92%. Pt with no apparent distress noted. Will continue to monitor.

## 2017-01-30 NOTE — PLAN OF CARE
Problem: Patient Care Overview  Goal: Plan of Care Review  Outcome: Ongoing (interventions implemented as appropriate)  Monitor SpO2, oxygen as needed to keep saturation greater than or equal to 88%. Encourage periodic deep breathing and increased activity.

## 2017-01-30 NOTE — PLAN OF CARE
01/30/17 1443   Final Note   What phone number can be called within the next 1-3 days to see how you are doing after discharge? [phone]   Hospital Follow Up  Appt(s) scheduled? Yes   Discharge plans and expectations educations in teach back method with documentation complete? Yes   Offered [facility] -- Bedside Delivery? Yes   Discharge/Hospital Encounter Summary to (non-[facility]) PCP No   Referral to Outpatient Case Management complete? No   30 day supply of medicines given at discharge, if documented non-compliance / non-adherence? No   Any social issues identified prior to discharge? No   Right Care Referral Info   Post Acute Recommendation Home-care   Referral Type home health   Facility Name [facility]     Lety Mckeon, RN, CCM, CMSRN  RN Transition Navigator  [phone]

## 2017-01-30 NOTE — PLAN OF CARE
Problem: Patient Care Overview  Goal: Plan of Care Review  Outcome: Ongoing (interventions implemented as appropriate)  Plan of care reviewed with patient. Patient verbalized understanding. Patient's o2 sats in mid 90's throughout shift on room air. Patient encouraged to shift weight frequently to prevent skin breakdown. Patient NSR on telemetry monitoring, HR 80's-90's, with no ectopy noted. Bed is low and locked, bed alarm is activated, call light is within reach. Patient has been instructed to call if in need of assistance. Patient verbalized understanding.

## 2017-01-30 NOTE — PLAN OF CARE
Problem: SLP Goal  Goal: SLP Goal  Short Term Goals:   1. Pt will tolerate a dental soft diet and thin liquids with no overt s/s of aspiration.   2. Pt will demonstrate/verbalize 3 safe swallowing precautions with min cues.   Outcome: Ongoing (interventions implemented as appropriate)  Pt seen to assess diet tolerance and possible diet upgrade. Pt accepted multiple bites of peaches via tsp, sips of water via straw, and 1 bite of aime cracker. Pt presented with a cough s/p aime cracker trial. No overt s/s of aspiration noted with dental soft trials and thin liquids. Pt demonstrated and verbalized understanding of safe swallowing precautions. Pt's  verbalized understanding. Recommend: continued dental soft diet, thin liquids, whole po medications, upright for all po intake and 30 minutes s/p po intake, small bites and sips, alternate bites and sips, check for oral pocketing/residue, slow rate. No further skilled ST services warranted at this time. Please reconsult as needed.   MALINI Burnett., CCC-SLP  Spectra: [phone]  01/30/2017

## 2017-08-20 ENCOUNTER — HOSPITAL ENCOUNTER (INPATIENT)
Facility: HOSPITAL | Age: 77
LOS: 3 days | Discharge: HOME HEALTH | DRG: 189 | End: 2017-08-23
Attending: EMERGENCY MEDICINE | Admitting: HOSPITALIST
Payer: MEDICARE

## 2017-08-20 DIAGNOSIS — R06.89 HYPERCAPNIA: ICD-10-CM

## 2017-08-20 DIAGNOSIS — J96.21 ACUTE ON CHRONIC RESPIRATORY FAILURE WITH HYPOXIA AND HYPERCAPNIA: ICD-10-CM

## 2017-08-20 DIAGNOSIS — J96.22 ACUTE ON CHRONIC RESPIRATORY FAILURE WITH HYPOXIA AND HYPERCAPNIA: ICD-10-CM

## 2017-08-20 DIAGNOSIS — L03.90 CELLULITIS: ICD-10-CM

## 2017-08-20 DIAGNOSIS — J44.9 OSA AND COPD OVERLAP SYNDROME: Chronic | ICD-10-CM

## 2017-08-20 DIAGNOSIS — J96.11 CHRONIC RESPIRATORY FAILURE WITH HYPOXIA AND HYPERCAPNIA: Chronic | ICD-10-CM

## 2017-08-20 DIAGNOSIS — J43.2 CENTRILOBULAR EMPHYSEMA: Chronic | ICD-10-CM

## 2017-08-20 DIAGNOSIS — J96.22 ACUTE ON CHRONIC RESPIRATORY FAILURE WITH HYPERCAPNIA: ICD-10-CM

## 2017-08-20 DIAGNOSIS — J96.12 CHRONIC RESPIRATORY FAILURE WITH HYPOXIA AND HYPERCAPNIA: Chronic | ICD-10-CM

## 2017-08-20 DIAGNOSIS — G47.33 OSA AND COPD OVERLAP SYNDROME: Chronic | ICD-10-CM

## 2017-08-20 DIAGNOSIS — L03.116 CELLULITIS OF LEFT LOWER EXTREMITY: Primary | ICD-10-CM

## 2017-08-20 DIAGNOSIS — R06.02 SOB (SHORTNESS OF BREATH): ICD-10-CM

## 2017-08-20 DIAGNOSIS — E11.9 TYPE 2 DIABETES MELLITUS TREATED WITHOUT INSULIN: Chronic | ICD-10-CM

## 2017-08-20 LAB
ALBUMIN SERPL BCP-MCNC: 2.9 G/DL
ALLENS TEST: ABNORMAL
ALP SERPL-CCNC: 114 U/L
ALT SERPL W/O P-5'-P-CCNC: 14 U/L
ANION GAP SERPL CALC-SCNC: 8 MMOL/L
AST SERPL-CCNC: 16 U/L
BACTERIA #/AREA URNS HPF: NORMAL /HPF
BASOPHILS # BLD AUTO: 0.02 K/UL
BASOPHILS NFR BLD: 0.2 %
BILIRUB SERPL-MCNC: 0.4 MG/DL
BILIRUB UR QL STRIP: NEGATIVE
BNP SERPL-MCNC: 83 PG/ML
BNP SERPL-MCNC: 83 PG/ML
BUN SERPL-MCNC: 27 MG/DL
CALCIUM SERPL-MCNC: 9.1 MG/DL
CHLORIDE SERPL-SCNC: 97 MMOL/L
CK SERPL-CCNC: 80 U/L
CLARITY UR: ABNORMAL
CO2 SERPL-SCNC: 37 MMOL/L
COLOR UR: YELLOW
CREAT SERPL-MCNC: 1.1 MG/DL
DELSYS: ABNORMAL
DIFFERENTIAL METHOD: ABNORMAL
EOSINOPHIL # BLD AUTO: 0.1 K/UL
EOSINOPHIL NFR BLD: 1 %
ERYTHROCYTE [DISTWIDTH] IN BLOOD BY AUTOMATED COUNT: 15.5 %
EST. GFR  (AFRICAN AMERICAN): 56 ML/MIN/1.73 M^2
EST. GFR  (NON AFRICAN AMERICAN): 48 ML/MIN/1.73 M^2
FLOW: 3
FLUAV AG SPEC QL IA: NEGATIVE
FLUBV AG SPEC QL IA: NEGATIVE
GLUCOSE SERPL-MCNC: 109 MG/DL
GLUCOSE UR QL STRIP: NEGATIVE
HCO3 UR-SCNC: 39.9 MMOL/L (ref 24–28)
HCT VFR BLD AUTO: 39.3 %
HGB BLD-MCNC: 11.8 G/DL
HGB UR QL STRIP: ABNORMAL
HYALINE CASTS #/AREA URNS LPF: 0 /LPF
INR PPP: 0.9
KETONES UR QL STRIP: NEGATIVE
LACTATE SERPL-SCNC: 0.8 MMOL/L
LEUKOCYTE ESTERASE UR QL STRIP: ABNORMAL
LIPASE SERPL-CCNC: 19 U/L
LYMPHOCYTES # BLD AUTO: 1.8 K/UL
LYMPHOCYTES NFR BLD: 17.4 %
MAGNESIUM SERPL-MCNC: 2.1 MG/DL
MCH RBC QN AUTO: 29.6 PG
MCHC RBC AUTO-ENTMCNC: 30 G/DL
MCV RBC AUTO: 99 FL
MICROSCOPIC COMMENT: NORMAL
MODE: ABNORMAL
MONOCYTES # BLD AUTO: 0.5 K/UL
MONOCYTES NFR BLD: 4.4 %
NEUTROPHILS # BLD AUTO: 7.9 K/UL
NEUTROPHILS NFR BLD: 76.4 %
NITRITE UR QL STRIP: NEGATIVE
OB PNL STL: POSITIVE
PCO2 BLDA: 83.4 MMHG (ref 35–45)
PH SMN: 7.29 [PH] (ref 7.35–7.45)
PH UR STRIP: 6 [PH] (ref 5–8)
PLATELET # BLD AUTO: 315 K/UL
PMV BLD AUTO: 9.2 FL
PO2 BLDA: 84 MMHG (ref 80–100)
POC BE: 13 MMOL/L
POC SATURATED O2: 94 % (ref 95–100)
POC TCO2: 42 MMOL/L (ref 23–27)
POCT GLUCOSE: 188 MG/DL (ref 70–110)
POTASSIUM SERPL-SCNC: 4.4 MMOL/L
PROT SERPL-MCNC: 7.8 G/DL
PROT UR QL STRIP: ABNORMAL
PROTHROMBIN TIME: 10.1 SEC
RBC # BLD AUTO: 3.99 M/UL
RBC #/AREA URNS HPF: 2 /HPF (ref 0–4)
SAMPLE: ABNORMAL
SITE: ABNORMAL
SODIUM SERPL-SCNC: 142 MMOL/L
SP GR UR STRIP: 1.02 (ref 1–1.03)
SP02: 99
SPECIMEN SOURCE: NORMAL
SQUAMOUS #/AREA URNS HPF: 4 /HPF
TROPONIN I SERPL DL<=0.01 NG/ML-MCNC: <0.006 NG/ML
TSH SERPL DL<=0.005 MIU/L-ACNC: 2.03 UIU/ML
TSH SERPL DL<=0.005 MIU/L-ACNC: 2.41 UIU/ML
URN SPEC COLLECT METH UR: ABNORMAL
UROBILINOGEN UR STRIP-ACNC: 1 EU/DL
WBC # BLD AUTO: 10.36 K/UL
WBC #/AREA URNS HPF: 1 /HPF (ref 0–5)

## 2017-08-20 PROCEDURE — 93010 ELECTROCARDIOGRAM REPORT: CPT | Mod: ,,, | Performed by: INTERNAL MEDICINE

## 2017-08-20 PROCEDURE — 82962 GLUCOSE BLOOD TEST: CPT

## 2017-08-20 PROCEDURE — 81000 URINALYSIS NONAUTO W/SCOPE: CPT

## 2017-08-20 PROCEDURE — 80053 COMPREHEN METABOLIC PANEL: CPT

## 2017-08-20 PROCEDURE — 82272 OCCULT BLD FECES 1-3 TESTS: CPT

## 2017-08-20 PROCEDURE — 83735 ASSAY OF MAGNESIUM: CPT

## 2017-08-20 PROCEDURE — 25000003 PHARM REV CODE 250: Performed by: EMERGENCY MEDICINE

## 2017-08-20 PROCEDURE — 82533 TOTAL CORTISOL: CPT

## 2017-08-20 PROCEDURE — 12000002 HC ACUTE/MED SURGE SEMI-PRIVATE ROOM

## 2017-08-20 PROCEDURE — 96361 HYDRATE IV INFUSION ADD-ON: CPT

## 2017-08-20 PROCEDURE — 83605 ASSAY OF LACTIC ACID: CPT

## 2017-08-20 PROCEDURE — 85610 PROTHROMBIN TIME: CPT

## 2017-08-20 PROCEDURE — 93005 ELECTROCARDIOGRAM TRACING: CPT

## 2017-08-20 PROCEDURE — 84484 ASSAY OF TROPONIN QUANT: CPT

## 2017-08-20 PROCEDURE — 99285 EMERGENCY DEPT VISIT HI MDM: CPT | Mod: 25

## 2017-08-20 PROCEDURE — 87400 INFLUENZA A/B EACH AG IA: CPT

## 2017-08-20 PROCEDURE — 83880 ASSAY OF NATRIURETIC PEPTIDE: CPT

## 2017-08-20 PROCEDURE — 85025 COMPLETE CBC W/AUTO DIFF WBC: CPT

## 2017-08-20 PROCEDURE — 63600175 PHARM REV CODE 636 W HCPCS: Performed by: EMERGENCY MEDICINE

## 2017-08-20 PROCEDURE — 83690 ASSAY OF LIPASE: CPT

## 2017-08-20 PROCEDURE — 84443 ASSAY THYROID STIM HORMONE: CPT | Mod: 91

## 2017-08-20 PROCEDURE — 96365 THER/PROPH/DIAG IV INF INIT: CPT

## 2017-08-20 PROCEDURE — 82550 ASSAY OF CK (CPK): CPT

## 2017-08-20 PROCEDURE — 87040 BLOOD CULTURE FOR BACTERIA: CPT | Mod: 59

## 2017-08-20 PROCEDURE — 84145 PROCALCITONIN (PCT): CPT

## 2017-08-20 RX ADMIN — PIPERACILLIN AND TAZOBACTAM 4.5 G: 4; .5 INJECTION, POWDER, FOR SOLUTION INTRAVENOUS at 10:08

## 2017-08-21 PROBLEM — N18.30 CHRONIC KIDNEY DISEASE, STAGE III (MODERATE): Status: ACTIVE | Noted: 2017-08-21

## 2017-08-21 PROBLEM — B37.2 YEAST INFECTION OF THE SKIN: Status: ACTIVE | Noted: 2017-08-21

## 2017-08-21 PROBLEM — L03.116 CELLULITIS OF LEFT LOWER EXTREMITY: Status: ACTIVE | Noted: 2017-08-20

## 2017-08-21 PROBLEM — J96.02 ACUTE RESPIRATORY ACIDOSIS: Status: ACTIVE | Noted: 2017-08-21

## 2017-08-21 PROBLEM — Z99.81 SUPPLEMENTAL OXYGEN DEPENDENT: Status: ACTIVE | Noted: 2017-08-21

## 2017-08-21 PROBLEM — J96.22 ACUTE ON CHRONIC RESPIRATORY FAILURE WITH HYPERCAPNIA: Status: ACTIVE | Noted: 2017-08-21

## 2017-08-21 LAB
ALLENS TEST: ABNORMAL
ALLENS TEST: ABNORMAL
ANION GAP SERPL CALC-SCNC: 5 MMOL/L
BUN SERPL-MCNC: 26 MG/DL
CALCIUM SERPL-MCNC: 9.1 MG/DL
CHLORIDE SERPL-SCNC: 99 MMOL/L
CO2 SERPL-SCNC: 40 MMOL/L
CORTIS SERPL-MCNC: 9.1 UG/DL
CREAT SERPL-MCNC: 1.2 MG/DL
DELSYS: ABNORMAL
EP: 5
EST. GFR  (AFRICAN AMERICAN): 50 ML/MIN/1.73 M^2
EST. GFR  (NON AFRICAN AMERICAN): 43 ML/MIN/1.73 M^2
FIO2: 30
FIO2: 30
FIO2: 40
GLUCOSE SERPL-MCNC: 110 MG/DL
HCO3 UR-SCNC: 40.8 MMOL/L (ref 24–28)
HCO3 UR-SCNC: 41.1 MMOL/L (ref 24–28)
HCO3 UR-SCNC: 44.6 MMOL/L (ref 24–28)
IP: 16
IP: 16
IP: 22
LACTATE SERPL-SCNC: 0.7 MMOL/L
MODE: ABNORMAL
PCO2 BLDA: 104.3 MMHG (ref 35–45)
PCO2 BLDA: 108 MMHG (ref 35–45)
PCO2 BLDA: 84.5 MMHG (ref 35–45)
PH SMN: 7.2 [PH] (ref 7.35–7.45)
PH SMN: 7.22 [PH] (ref 7.35–7.45)
PH SMN: 7.29 [PH] (ref 7.35–7.45)
PO2 BLDA: 107 MMHG (ref 80–100)
PO2 BLDA: 54 MMHG (ref 80–100)
PO2 BLDA: 68 MMHG (ref 80–100)
POC BE: 13 MMOL/L
POC BE: 14 MMOL/L
POC BE: 17 MMOL/L
POC SATURATED O2: 76 % (ref 95–100)
POC SATURATED O2: 89 % (ref 95–100)
POC SATURATED O2: 96 % (ref 95–100)
POC TCO2: 43 MMOL/L (ref 23–27)
POC TCO2: 44 MMOL/L (ref 23–27)
POC TCO2: 48 MMOL/L (ref 23–27)
POCT GLUCOSE: 131 MG/DL (ref 70–110)
POCT GLUCOSE: 140 MG/DL (ref 70–110)
POTASSIUM SERPL-SCNC: 4.2 MMOL/L
PROCALCITONIN SERPL IA-MCNC: 0.08 NG/ML
SAMPLE: ABNORMAL
SITE: ABNORMAL
SODIUM SERPL-SCNC: 144 MMOL/L

## 2017-08-21 PROCEDURE — 82803 BLOOD GASES ANY COMBINATION: CPT

## 2017-08-21 PROCEDURE — 20000000 HC ICU ROOM

## 2017-08-21 PROCEDURE — 94761 N-INVAS EAR/PLS OXIMETRY MLT: CPT

## 2017-08-21 PROCEDURE — 36600 WITHDRAWAL OF ARTERIAL BLOOD: CPT

## 2017-08-21 PROCEDURE — 25000242 PHARM REV CODE 250 ALT 637 W/ HCPCS: Performed by: NURSE PRACTITIONER

## 2017-08-21 PROCEDURE — 99900035 HC TECH TIME PER 15 MIN (STAT)

## 2017-08-21 PROCEDURE — 94660 CPAP INITIATION&MGMT: CPT

## 2017-08-21 PROCEDURE — 25000003 PHARM REV CODE 250: Performed by: NURSE PRACTITIONER

## 2017-08-21 PROCEDURE — 27000190 HC CPAP FULL FACE MASK W/VALVE

## 2017-08-21 PROCEDURE — 83605 ASSAY OF LACTIC ACID: CPT

## 2017-08-21 PROCEDURE — 94640 AIRWAY INHALATION TREATMENT: CPT

## 2017-08-21 PROCEDURE — 63600175 PHARM REV CODE 636 W HCPCS: Performed by: HOSPITALIST

## 2017-08-21 PROCEDURE — 80048 BASIC METABOLIC PNL TOTAL CA: CPT

## 2017-08-21 PROCEDURE — 63600175 PHARM REV CODE 636 W HCPCS: Performed by: NURSE PRACTITIONER

## 2017-08-21 PROCEDURE — 27000221 HC OXYGEN, UP TO 24 HOURS

## 2017-08-21 RX ORDER — AMLODIPINE BESYLATE 5 MG/1
5 TABLET ORAL DAILY
Status: DISCONTINUED | OUTPATIENT
Start: 2017-08-21 | End: 2017-08-23 | Stop reason: HOSPADM

## 2017-08-21 RX ORDER — ATORVASTATIN CALCIUM 40 MG/1
40 TABLET, FILM COATED ORAL DAILY
Status: DISCONTINUED | OUTPATIENT
Start: 2017-08-21 | End: 2017-08-23 | Stop reason: HOSPADM

## 2017-08-21 RX ORDER — ACETAMINOPHEN 325 MG/1
650 TABLET ORAL EVERY 4 HOURS PRN
Status: DISCONTINUED | OUTPATIENT
Start: 2017-08-21 | End: 2017-08-23 | Stop reason: HOSPADM

## 2017-08-21 RX ORDER — FLUTICASONE FUROATE AND VILANTEROL 100; 25 UG/1; UG/1
1 POWDER RESPIRATORY (INHALATION) DAILY
Status: DISCONTINUED | OUTPATIENT
Start: 2017-08-21 | End: 2017-08-23 | Stop reason: HOSPADM

## 2017-08-21 RX ORDER — DULOXETIN HYDROCHLORIDE 30 MG/1
60 CAPSULE, DELAYED RELEASE ORAL DAILY
Status: DISCONTINUED | OUTPATIENT
Start: 2017-08-21 | End: 2017-08-23 | Stop reason: HOSPADM

## 2017-08-21 RX ORDER — PREGABALIN 50 MG/1
50 CAPSULE ORAL 3 TIMES DAILY
Status: DISCONTINUED | OUTPATIENT
Start: 2017-08-21 | End: 2017-08-22

## 2017-08-21 RX ORDER — SODIUM CHLORIDE 9 MG/ML
INJECTION, SOLUTION INTRAVENOUS CONTINUOUS
Status: DISCONTINUED | OUTPATIENT
Start: 2017-08-21 | End: 2017-08-21

## 2017-08-21 RX ORDER — RAMELTEON 8 MG/1
8 TABLET ORAL NIGHTLY PRN
Status: DISCONTINUED | OUTPATIENT
Start: 2017-08-21 | End: 2017-08-23 | Stop reason: HOSPADM

## 2017-08-21 RX ORDER — PREDNISONE 20 MG/1
40 TABLET ORAL DAILY
Status: DISCONTINUED | OUTPATIENT
Start: 2017-08-22 | End: 2017-08-23 | Stop reason: HOSPADM

## 2017-08-21 RX ORDER — IBUPROFEN 200 MG
24 TABLET ORAL
Status: DISCONTINUED | OUTPATIENT
Start: 2017-08-21 | End: 2017-08-23 | Stop reason: HOSPADM

## 2017-08-21 RX ORDER — PENTOXIFYLLINE 400 MG/1
400 TABLET, EXTENDED RELEASE ORAL 2 TIMES DAILY WITH MEALS
Status: DISCONTINUED | OUTPATIENT
Start: 2017-08-21 | End: 2017-08-23 | Stop reason: HOSPADM

## 2017-08-21 RX ORDER — INSULIN ASPART 100 [IU]/ML
0-5 INJECTION, SOLUTION INTRAVENOUS; SUBCUTANEOUS
Status: DISCONTINUED | OUTPATIENT
Start: 2017-08-21 | End: 2017-08-23 | Stop reason: HOSPADM

## 2017-08-21 RX ORDER — ONDANSETRON 8 MG/1
8 TABLET, ORALLY DISINTEGRATING ORAL EVERY 8 HOURS PRN
Status: DISCONTINUED | OUTPATIENT
Start: 2017-08-21 | End: 2017-08-23 | Stop reason: HOSPADM

## 2017-08-21 RX ORDER — POLYETHYLENE GLYCOL 3350 17 G/17G
17 POWDER, FOR SOLUTION ORAL 2 TIMES DAILY PRN
Status: DISCONTINUED | OUTPATIENT
Start: 2017-08-21 | End: 2017-08-23 | Stop reason: HOSPADM

## 2017-08-21 RX ORDER — METHYLPREDNISOLONE SOD SUCC 125 MG
80 VIAL (EA) INJECTION EVERY 8 HOURS
Status: DISCONTINUED | OUTPATIENT
Start: 2017-08-21 | End: 2017-08-21

## 2017-08-21 RX ORDER — ONDANSETRON 2 MG/ML
4 INJECTION INTRAMUSCULAR; INTRAVENOUS EVERY 12 HOURS PRN
Status: DISCONTINUED | OUTPATIENT
Start: 2017-08-21 | End: 2017-08-23 | Stop reason: HOSPADM

## 2017-08-21 RX ORDER — METOPROLOL TARTRATE 1 MG/ML
5 INJECTION, SOLUTION INTRAVENOUS EVERY 6 HOURS PRN
Status: DISCONTINUED | OUTPATIENT
Start: 2017-08-21 | End: 2017-08-23 | Stop reason: HOSPADM

## 2017-08-21 RX ORDER — ENOXAPARIN SODIUM 100 MG/ML
40 INJECTION SUBCUTANEOUS EVERY 24 HOURS
Status: DISCONTINUED | OUTPATIENT
Start: 2017-08-21 | End: 2017-08-23 | Stop reason: HOSPADM

## 2017-08-21 RX ORDER — CEFAZOLIN SODIUM 1 G/50ML
1 SOLUTION INTRAVENOUS
Status: DISCONTINUED | OUTPATIENT
Start: 2017-08-21 | End: 2017-08-22

## 2017-08-21 RX ORDER — SULFAMETHOXAZOLE AND TRIMETHOPRIM 800; 160 MG/1; MG/1
1 TABLET ORAL 2 TIMES DAILY
Status: DISCONTINUED | OUTPATIENT
Start: 2017-08-21 | End: 2017-08-21

## 2017-08-21 RX ORDER — NYSTATIN AND TRIAMCINOLONE ACETONIDE 100000; 1 [USP'U]/G; MG/G
OINTMENT TOPICAL 3 TIMES DAILY
Status: DISCONTINUED | OUTPATIENT
Start: 2017-08-21 | End: 2017-08-23 | Stop reason: HOSPADM

## 2017-08-21 RX ORDER — HYDRALAZINE HYDROCHLORIDE 20 MG/ML
10 INJECTION INTRAMUSCULAR; INTRAVENOUS EVERY 6 HOURS PRN
Status: DISCONTINUED | OUTPATIENT
Start: 2017-08-21 | End: 2017-08-23 | Stop reason: HOSPADM

## 2017-08-21 RX ORDER — GLUCAGON 1 MG
1 KIT INJECTION
Status: DISCONTINUED | OUTPATIENT
Start: 2017-08-21 | End: 2017-08-23 | Stop reason: HOSPADM

## 2017-08-21 RX ORDER — IPRATROPIUM BROMIDE AND ALBUTEROL SULFATE 2.5; .5 MG/3ML; MG/3ML
3 SOLUTION RESPIRATORY (INHALATION) EVERY 4 HOURS
Status: DISCONTINUED | OUTPATIENT
Start: 2017-08-21 | End: 2017-08-23 | Stop reason: HOSPADM

## 2017-08-21 RX ORDER — IBUPROFEN 200 MG
16 TABLET ORAL
Status: DISCONTINUED | OUTPATIENT
Start: 2017-08-21 | End: 2017-08-23 | Stop reason: HOSPADM

## 2017-08-21 RX ORDER — DONEPEZIL HYDROCHLORIDE 5 MG/1
5 TABLET, FILM COATED ORAL NIGHTLY
Status: DISCONTINUED | OUTPATIENT
Start: 2017-08-21 | End: 2017-08-23 | Stop reason: HOSPADM

## 2017-08-21 RX ADMIN — CEFAZOLIN SODIUM 1 G: 1 SOLUTION INTRAVENOUS at 06:08

## 2017-08-21 RX ADMIN — NYSTATIN AND TRIAMCINOLONE ACETONIDE: 100000; 1 OINTMENT TOPICAL at 04:08

## 2017-08-21 RX ADMIN — NYSTATIN AND TRIAMCINOLONE ACETONIDE: 100000; 1 OINTMENT TOPICAL at 09:08

## 2017-08-21 RX ADMIN — DONEPEZIL HYDROCHLORIDE 5 MG: 5 TABLET, FILM COATED ORAL at 09:08

## 2017-08-21 RX ADMIN — IPRATROPIUM BROMIDE AND ALBUTEROL SULFATE 3 ML: .5; 3 SOLUTION RESPIRATORY (INHALATION) at 04:08

## 2017-08-21 RX ADMIN — THERA TABS 1 TABLET: TAB at 03:08

## 2017-08-21 RX ADMIN — CEFAZOLIN SODIUM 1 G: 1 SOLUTION INTRAVENOUS at 12:08

## 2017-08-21 RX ADMIN — METOPROLOL TARTRATE 5 MG: 5 INJECTION INTRAVENOUS at 10:08

## 2017-08-21 RX ADMIN — PREGABALIN 50 MG: 50 CAPSULE ORAL at 09:08

## 2017-08-21 RX ADMIN — ATORVASTATIN CALCIUM 40 MG: 40 TABLET, FILM COATED ORAL at 03:08

## 2017-08-21 RX ADMIN — IPRATROPIUM BROMIDE AND ALBUTEROL SULFATE 3 ML: .5; 3 SOLUTION RESPIRATORY (INHALATION) at 07:08

## 2017-08-21 RX ADMIN — PREGABALIN 50 MG: 50 CAPSULE ORAL at 03:08

## 2017-08-21 RX ADMIN — IPRATROPIUM BROMIDE AND ALBUTEROL SULFATE 3 ML: .5; 3 SOLUTION RESPIRATORY (INHALATION) at 11:08

## 2017-08-21 RX ADMIN — HYDRALAZINE HYDROCHLORIDE 10 MG: 20 INJECTION INTRAMUSCULAR; INTRAVENOUS at 06:08

## 2017-08-21 RX ADMIN — AMLODIPINE BESYLATE 5 MG: 5 TABLET ORAL at 03:08

## 2017-08-21 RX ADMIN — DULOXETINE 60 MG: 30 CAPSULE, DELAYED RELEASE ORAL at 03:08

## 2017-08-21 RX ADMIN — ENOXAPARIN SODIUM 40 MG: 100 INJECTION SUBCUTANEOUS at 04:08

## 2017-08-21 RX ADMIN — SODIUM CHLORIDE: 0.9 INJECTION, SOLUTION INTRAVENOUS at 06:08

## 2017-08-21 RX ADMIN — METHYLPREDNISOLONE SODIUM SUCCINATE 80 MG: 125 INJECTION, POWDER, FOR SOLUTION INTRAMUSCULAR; INTRAVENOUS at 10:08

## 2017-08-21 RX ADMIN — IPRATROPIUM BROMIDE AND ALBUTEROL SULFATE 3 ML: .5; 3 SOLUTION RESPIRATORY (INHALATION) at 08:08

## 2017-08-21 NOTE — ASSESSMENT & PLAN NOTE
COPD, Obstructive Sleep Apnea, Acute Encephalopathy, Respiratory Acidosis,  Home Oxygen Dependent  Morbidly obese patient with history of sleep apnea requiring CPAP machine, but does not have CPAP machine at home presents with Somnolence and Mental Status change.  She had significant respiratory acidoses on ABG with PH 7.288, PCO2 83.4, PO2 84, BiCarb 39.9. Chest X-Ray and head CT negative. Encephalopathy secondary to hypercarbia.  --CPAP at night  --Breathing treatment  --Continuous pulse and telemetry monitoring  --Repeat ABG  --Case Management Consult for home health and assistance with CPAP

## 2017-08-21 NOTE — ASSESSMENT & PLAN NOTE
Angry red, raised, sharply demarcated, foul smelling, rash under panis.   --Cleans BID and apply nystatin-triamcinolone ointment TID

## 2017-08-21 NOTE — H&P
"[facility] Medicine  History & Physical    Patient Name: Adela Ramírez  MRN: [id number]  Admission Date: 8/20/2017  Attending Physician: Rafaela Sylvester MD   Primary Care Provider: Cindy Spencer NP         Patient information was obtained from patient, past medical records and ER records.     Subjective:     Principal Problem:Acute on chronic respiratory failure with hypoxia and hypercapnia    Chief Complaint:   Chief Complaint   Patient presents with    Hypoxemia     Family states O2 was low 2 nights ago, getting worse, reports mild shortness of breath, tachypneic in traige.  Also c/o bilat lower leg pain.         HPI: Adela Ramírez is a 77 female with a PMHx of Hypertension, Hypertension, Diabetes Type 2, Dementia, Depression, GERD, Obstructive Sleep Apnea (Should be on BiPAP, but no Machine at home, home Oxygen Dependent, and Chronic Kidney Disease. She lives with her sister.  Her PCP is Dr. Hackett.    She presented to [facility] ED with her nephew and chief complaint of "not acting right".  The nephew says that over the past 3 days patient has at times been talking without making sense has been more sleepy than usual and also generally more weak.  The nephew says that 2 days ago they called EMS and at that time EMS said that her oxygen level was low, patient refused to go to the hospital at that time, EMS gave her a new cannula as her old cannula was not operating correctly.  She was incontinent of stool last night which is not her baseline. She was placed on 4 LPM Oxygen and became more lethargic.  She had significant respiratory acidoses on ABG with PH 7.288, PCO2 83.4, PO2 84, BiCarb 39.9. She was placed on CPAP. She has cellulitis of the left lower extremity, that she states has been there about 1 week and is likely from her dog scratch.  She has an angry red, foul smelling yeast rash under her Panis, which she treats with Desitin  Cream. Per rectal exam, she had brown " Hemoccult + Stool with Hgb 11.8 and Hct 39.3.  She was treated with piperacillin-tazobactam 4.5 g for her cellulitis. She is admitted to [facility] for Acute on Chronic hyercarbic respiratory failure and cellulitis to [facility].             Past Medical History:   Diagnosis Date    Arthritis     CKD (chronic kidney disease)     Dementia     Depression     DM2 (diabetes mellitus, type 2)     GERD (gastroesophageal reflux disease)     HLD (hyperlipidemia)     HTN (hypertension)     HIWOT on CPAP        Past Surgical History:   Procedure Laterality Date    HYSTERECTOMY         Review of patient's allergies indicates:  No Known Allergies    Current Facility-Administered Medications on File Prior to Encounter   Medication    etomidate injection    propofol (DIPRIVAN) 10 mg/mL infusion    succinylcholine injection     Current Outpatient Prescriptions on File Prior to Encounter   Medication Sig    albuterol (PROVENTIL/VENTOLIN) 90 mcg/actuation inhaler Inhale 2 puffs into the lungs every 6 (six) hours as needed for Wheezing.    amlodipine (NORVASC) 5 MG tablet Take 1 tablet (5 mg total) by mouth once daily.    atorvastatin (LIPITOR) 40 MG tablet Take 1 tablet (40 mg total) by mouth once daily.    donepezil (ARICEPT) 5 MG tablet Take 5 mg by mouth every evening.    duloxetine (CYMBALTA) 60 MG capsule Take 1 capsule (60 mg total) by mouth once daily.    fish oil-omega-3 fatty acids 300-1,000 mg capsule Take 2 g by mouth once daily.    fluticasone-salmeterol 100-50 mcg/dose (ADVAIR) 100-50 mcg/dose diskus inhaler Inhale 1 puff into the lungs 2 (two) times daily.    glipiZIDE (GLUCOTROL) 5 MG tablet Take 0.5 tablets (2.5 mg total) by mouth daily with breakfast.    multivitamin (THERAGRAN) per tablet Take 1 tablet by mouth once daily.    omega-3 acid ethyl esters (LOVAZA) 1 gram capsule Take 2 g by mouth 2 (two) times daily.    pentoxifylline (TRENTAL) 400 mg TbSR Take 400 mg by mouth 2 (two) times  daily with meals.    pregabalin (LYRICA) 50 MG capsule Take 50 mg by mouth 3 (three) times daily.    [DISCONTINUED] aspirin 325 MG tablet Take 325 mg by mouth once daily.     Family History     None        Social History Main Topics    Smoking status: Never Smoker    Smokeless tobacco: Never Used    Alcohol use No    Drug use: No    Sexual activity: Not on file     Review of Systems   Constitutional: Positive for activity change and fatigue. Negative for chills, diaphoresis and fever.   HENT: Negative for sore throat and trouble swallowing.    Eyes: Negative for photophobia and visual disturbance.   Respiratory: Positive for shortness of breath. Negative for cough and wheezing.    Cardiovascular: Negative for chest pain and palpitations.   Gastrointestinal: Negative for abdominal pain, constipation, diarrhea, nausea and vomiting.   Endocrine: Negative for polydipsia and polyphagia.   Genitourinary: Negative for decreased urine volume, dysuria, hematuria and urgency.   Musculoskeletal: Negative for joint swelling, neck pain and neck stiffness.   Skin: Positive for rash (Under Panis) and wound (Red left lower leg, ).   Neurological: Negative for weakness, numbness and headaches.   Psychiatric/Behavioral: Negative for agitation, dysphoric mood and suicidal ideas.     Objective:     Vital Signs (Most Recent):  Temp: 97.7 °F (36.5 °C) (08/21/17 0256)  Pulse: 81 (08/21/17 0256)  Resp: (!) 23 (08/21/17 0256)  BP: (!) 154/68 (08/21/17 0256)  SpO2: 95 % (08/21/17 0256) Vital Signs (24h Range):  Temp:  [97.6 °F (36.4 °C)-99 °F (37.2 °C)] 97.7 °F (36.5 °C)  Pulse:  [71-90] 81  Resp:  [20-28] 23  SpO2:  [77 %-100 %] 95 %  BP: (102-210)/(47-92) 154/68     Weight: (!) 145.2 kg (320 lb)  Body mass index is 56.69 kg/m².    Physical Exam   Constitutional: She is oriented to person, place, and time. She appears lethargic. She is easily aroused. She appears ill. She appears distressed. Face mask in place.       Obese  On CPAP    HENT:   Head: Normocephalic and atraumatic.   Mouth/Throat: Oropharynx is clear and moist. No oropharyngeal exudate.   Eyes: Conjunctivae are normal. Pupils are equal, round, and reactive to light. No scleral icterus.   Neck: Neck supple.   Cardiovascular: Normal rate, regular rhythm, normal heart sounds and intact distal pulses.  Exam reveals no gallop and no friction rub.    No murmur heard.  Pulmonary/Chest: Breath sounds normal. She is in respiratory distress (Mild). She has no wheezes. She has no rales.   Abdominal: Soft. Bowel sounds are normal. She exhibits no distension. There is no tenderness. There is no rebound and no guarding.   Musculoskeletal: She exhibits no edema, tenderness or deformity.   Neurological: She is oriented to person, place, and time and easily aroused. She appears lethargic. She exhibits normal muscle tone.   Skin: Skin is warm and dry. Capillary refill takes 2 to 3 seconds. Rash (See Picture) noted. She is not diaphoretic.   Red Left Lower Leg. See Picture   Psychiatric: She has a normal mood and affect. Her behavior is normal.   Nursing note and vitals reviewed.      Left Leg                                                            Abdomen, Under Panis     Significant Labs:   ABGs:   Recent Labs  Lab 08/20/17  1853   PH 7.288*   PCO2 83.4*   HCO3 39.9*   POCSATURATED 94*   BE 13     CBC:   Recent Labs  Lab 08/20/17  1845   WBC 10.36   HGB 11.8*   HCT 39.3        CMP:   Recent Labs  Lab 08/20/17  1845      K 4.4   CL 97   CO2 37*      BUN 27*   CREATININE 1.1   CALCIUM 9.1   PROT 7.8   ALBUMIN 2.9*   BILITOT 0.4   ALKPHOS 114   AST 16   ALT 14   ANIONGAP 8   EGFRNONAA 48*     Status:  Final result    Ref Range & Units 08/20/17 2148   Occult Blood Negative  Positive          Ref Range & Units 08/20/17 1849   Influenza A Ag, EIA Negative  Negative   Influenza B Ag, EIA Negative  Negative      Ref Range & Units 08/20/17 1845   Lactate (Lactic Acid) 0.5 - 2.2 mmol/L  0.8         Ref Range & Units 08/20/17 2217   Lipase 4 - 60 U/L 19     Final result    Ref Range & Units 08/20/17 2217   Procalcitonin <0.25 ng/mL 0.08          Cardiac Markers:   Recent Labs  Lab 08/20/17  1845   BNP 83  83     Coagulation:   Recent Labs  Lab 08/20/17 1845   INR 0.9     Troponin:   Recent Labs  Lab 08/20/17 1845   TROPONINI <0.006     TSH:   Recent Labs  Lab 08/20/17 2217   TSH 2.033     Urine Studies:   Recent Labs  Lab 08/20/17 2125   COLORU Yellow   APPEARANCEUA Hazy*   PHUR 6.0   SPECGRAV 1.025   PROTEINUA 1+*   GLUCUA Negative   KETONESU Negative   BILIRUBINUA Negative   OCCULTUA 2+*   NITRITE Negative   UROBILINOGEN 1.0   LEUKOCYTESUR Trace*   RBCUA 2   WBCUA 1   BACTERIA Rare   SQUAMEPITHEL 4   HYALINECASTS 0       Significant Imaging: I have reviewed all pertinent imaging results/findings within the past 24 hours.   Imaging Results          X-Ray Tibia Fibula 2 View Left (Final result)  Result time 08/20/17 22:42:50    Final result by Dewayne Cornejo MD (08/20/17 22:42:50)                 Impression:      No acute osseous abnormality identified.      Electronically signed by: DEWAYNE CORNEJO MD  Date:     08/20/17  Time:    22:42              Narrative:    Left tibia-fibula 2 views.  Comparison: None.    No evidence of fracture, dislocation, or osseous destructive process.  Degenerative changes are visualized at the ankle.  There is significant soft tissue swelling at the ankle.  Large posterior calcaneal spur visualized.  Mild degenerative changes seen of the knee.  No suprapatellar joint effusion.                             CT Head Without Contrast (Final result)  Result time 08/20/17 20:56:31    Final result by Heladio Tripp MD (08/20/17 20:56:31)                 Impression:        No acute major vascular territory infarct or intracranial hemorrhage identified.  Further evaluation/followup as warranted.    Superior right frontal scalp 1.5 cm probable sebaceous cyst. Correlate  clinically.      Electronically signed by: CRISTIANO TRIPP MD, MD  Date:     08/20/17  Time:    20:56              Narrative:    COMPARISON: None    FINDINGS: Patient is rotated and tilted within the scanner. The brain appears normally formed without evidence of hemorrhage, mass, midline shift or acute major vascular territory infarct.  Gray-white interface appears intact.  The ventricular system is normal in size for age and demonstrates no distortion by mass effect.  No extra-axial hemorrhage or mass.    There is a 1.5 cm or circumscribed, homogeneous, round mass with peripheral calcification located within the subcutaneous soft tissues of the right frontal scalp near the vertex, while nonspecific suggests a complex sebaceous cyst. Hyperostosis frontalis interna. No displaced calvarial fracture.  Imaged portions of the paranasal sinuses and mastoid air cells are clear. Probable prior surgery to the bilateral ocular lenses.                             US Lower Extremity Veins Bilateral (Final result)  Result time 08/20/17 20:14:39    Final result by Cristiano Tripp MD (08/20/17 20:14:39)                 Impression:         No evidence of deep venous thrombosis in either lower extremity.    Left calf nonspecific mild subcutaneous edema.            Electronically signed by: CRISTIANO TRIPP MD, MD  Date:     08/20/17  Time:    20:14              Narrative:    BILATERAL LOWER EXTREMITY DUPLEX ULTRASOUND.    Technique: The bilateral lower extremity deep venous system was imaged by ultrasound from the groin to the upper calf.  Veins were analyzed with grayscale, transducer compression, color doppler, and doppler spectral analysis.      Comparison: Bilateral lower extremity venous Doppler ultrasound 11/11/13    FINDINGS: Study is technically limited by body habitus.    RIGHT LEG:  The imaged common femoral, superficial femoral, popliteal, peroneal and anterior and posterior tibial veins show no signs of deep vein thrombosis.   The imaged common femoral, superficial femoral and popliteal veins were examined using spectral analysis and show normal wave forms with normal response to augmentation and respiration.    LEFT LEG:  The imaged common femoral, superficial femoral, popliteal, peroneal and anterior and posterior tibial veins show no signs of deep vein thrombosis.  The imaged common femoral, superficial femoral and popliteal veins were examined using spectral analysis and show normal wave forms with normal response to augmentation and respiration. Nonspecific mild subcutaneous edema at the left calf region.                             X-Ray Chest 1 View (Final result)  Result time 08/20/17 18:36:43    Final result by Dewayne Conrejo MD (08/20/17 18:36:43)                 Impression:      Pulmonary vascular congestion with mild pulmonary edema.      Electronically signed by: DEWAYNE CORNEJO MD  Date:     08/20/17  Time:    18:36              Narrative:    Chest AP single view.  Comparison: 1/27/17    Cardiac silhouette is upper limits of normal but stable size.  Lungs are symmetrically expanded.  Prominence of central pulmonary vasculature with increased bilateral interstitial markings and perihilar opacity suggestive for mild pulmonary edema.  No evidence of focal consolidative process, pneumothorax, or significant effusion.  Bones show DJD.  No free air visualized beneath the diaphragm.                                Assessment/Plan:     * Acute on chronic respiratory failure with hypoxia and hypercapnia    COPD, Obstructive Sleep Apnea, Acute Encephalopathy, Respiratory Acidosis,  Home Oxygen Dependent  Morbidly obese patient with history of sleep apnea requiring CPAP machine, but does not have CPAP machine at home presents with Somnolence and Mental Status change.  She had significant respiratory acidoses on ABG with PH 7.288, PCO2 83.4, PO2 84, BiCarb 39.9. Chest X-Ray and head CT negative. Encephalopathy secondary to  hypercarbia.  --CPAP at night  --Breathing treatment  --Continuous pulse and telemetry monitoring  --Repeat ABG  --Case Management Consult for home health and assistance with CPAP          Yeast infection of the skin    Angry red, raised, sharply demarcated, foul smelling, rash under panis.   --Cleans BID and apply nystatin-triamcinolone ointment TID          Cellulitis    Left Lower Extremity due to dog scratch. Mild. See Picture. Given piperacillin-tazobactam 4.5 g.  Will treat with oral sulfamethoxazole-trimethoprim 800-160mg per tablet 1 tablet BID          Alzheimer's dementia without behavioral disturbance    Patient is oriented on my exam, but admits to memory difficulties. She lives with her sister who helps her.  --Continue donepezil 5 mg every HS          Normocytic anemia    Hgb 11.8, Hct 39.3. At Baseline. Hemoccult + Brown stool per rectal exam. Trend CBC          Morbid obesity due to excess calories    BMI 56.7.  Nutritional Counselling          Mixed hyperlipidemia    Continue home atorvastatin 40 mg daily          Essential hypertension    -154  Continue home amlodipine 5 mg daily          Type 2 diabetes mellitus treated without insulin    1/25/17 HgA1c 7.5. Blood Sugar on Admit 109. Takes glipizide 5 mg daily. Holding  Check blood glucose AC and HS and use SSI.  Check A1c.  Diabetic diet.             CKD (chronic kidney disease) stage 4, GFR 15-29 ml/min    BUN 27, Creatinine 1.1. Shows some dehydration  --Normal Saline 100 ml/hr  --Avoid nephrotoxins and renally dose medications               VTE Risk Mitigation     None             Karen Moreira NP  Department of Hospital Medicine   [facility]

## 2017-08-21 NOTE — HPI
Adela Jessica is a 77 y.o. white woman with morbid obesity, obstructive sleep apnea, chronic obstructive pulmonary disease, chronic hypoxia and hypercapnia (on supplemental oxygen), chronic kidney disease stage 3, Alzheimer's dementia, hypertension, hyperlipidemia, and diabetes mellitus type 2.  She lives with her sister in [place], [place].  Her primary care provider is nurse practitioner Cindy Spencer.     She presented to [facility] on 8/20/17 with weakness, somnolence, and delirium.  Her home CPAP machine was broken.  Her nephew reported that EMS checked her 2 days prior and noted hypoxia, but she refused to go to the hospital at that time.  EMS gave her a new nasal cannula because hers was not operating correctly.  She worsened before being taken to the ED, where ABG showed pH 7.288, pCO2 83.4, pO2 84.  She was put on CPAP.  She also had left leg cellulitis that had been present for the past week, likely caused by a scratch from her dog.  She was given piperacillin-tazobactam.  She also had Candida intertrigo, which she was treating with zinc oxide.  She was admitted to [facility].

## 2017-08-21 NOTE — ASSESSMENT & PLAN NOTE
COPD with acute exacerbation  Obstructive Sleep Apnea  Acute Encephalopathy  Respiratory Acidosis,  Home Oxygen Dependent  Continue BiPAP currently. Pulmonary consulted. Continue A/A nebs. Start on steroids. Monitor mental status. Morbidly obese patient with history of sleep apnea requiring CPAP machine, but does not have CPAP machine at home, so will need to look into obtaining one prior to discharge. Continue ICU monitoring.

## 2017-08-21 NOTE — ED PROVIDER NOTES
"Encounter Date: 8/20/2017       History     Chief Complaint   Patient presents with    Hypoxemia     Family states O2 was low 2 nights ago, getting worse, reports mild shortness of breath, tachypneic in traige.  Also c/o bilat lower leg pain.      77-year-old female here with her nephew and chief complaint of "not acting right".  The nephew says that over the past 3 days patient has at times been talking without making sense has been more sleepy than usual and also generally more weak.  The nephew says that 2 days ago they called EMS and at that time EMS said that her oxygen level was low, patient refused to go to the hospital at that time, EMS gave her a new cannula as her old cannula was not operating correctly.  Patient does said sometimes seem to have an increased respiratory rate improved nephew.  The nephew says that last night she stooled herself in bed this is not usual for her.  Other than that he denies any cough or fevers or vomiting or missing of medication.  Her speech has not been slurred.  They are unsure of her home O2 usual amount.    Nephew says the last time she was like this she was very sick and was admitted for several days    Of note, on initial interview patient is on 4 L of O2 with sats 100% and is slightly lethargic.  When aroused she was oriented to person place and year. Denies pain and is able to move all extremities and follow basic commands.  I decreased her oxygen to 3 L to keep her at 94-95% sat.  I also ordered BiPAP.      General Illness    Illness onset: 2-3 days ago. The problem occurs continuously. The problem has been unchanged. Associated symptoms include shortness of breath. Pertinent negatives include no fever, no abdominal pain, no nausea, no sore throat and no neck pain.     Review of patient's allergies indicates:  No Known Allergies  Past Medical History:   Diagnosis Date    Arthritis     CKD (chronic kidney disease)     Dementia     Depression     DM2 (diabetes " mellitus, type 2)     GERD (gastroesophageal reflux disease)     HLD (hyperlipidemia)     HTN (hypertension)     HIWOT on CPAP      Past Surgical History:   Procedure Laterality Date    HYSTERECTOMY       History reviewed. No pertinent family history.  Social History   Substance Use Topics    Smoking status: Never Smoker    Smokeless tobacco: Never Used    Alcohol use No     Review of Systems   Constitutional: Negative for chills and fever.   HENT: Negative for sore throat.    Respiratory: Positive for shortness of breath.    Cardiovascular: Negative for chest pain.   Gastrointestinal: Negative for abdominal pain and nausea.   Genitourinary: Negative for flank pain.   Musculoskeletal: Negative for neck pain.   Skin: Positive for color change.   Psychiatric/Behavioral: Positive for confusion and decreased concentration.       Physical Exam     Initial Vitals [08/20/17 1743]   BP Pulse Resp Temp SpO2   (!) 210/90 90 (!) 28 98.5 °F (36.9 °C) (!) 77 %      MAP       130         Physical Exam    Nursing note and vitals reviewed.  Constitutional: She appears lethargic. She is Obese .   Clearly ill appearing, obese, poor general hygiene,   HENT:   Head: Atraumatic.   Eyes: Conjunctivae are normal.   Cardiovascular: Regular rhythm.   Pulmonary/Chest: Tachypnea noted. She has decreased breath sounds in the right lower field and the left lower field. She has no wheezes.   Abdominal: Soft. There is no tenderness. There is no CVA tenderness.       Musculoskeletal:        Legs:  Neurological: She is oriented to person, place, and time. She appears lethargic. GCS eye subscore is 4. GCS verbal subscore is 5. GCS motor subscore is 6.   Pt became more alert with O2 reduced from 4L to 3L and then with bipap         ED Course   Critical Care  Date/Time: 8/20/2017 11:45 PM  Performed by: BI CHAVEZ  Authorized by: BI CHAVEZ   Direct patient critical care time: 15 minutes  Additional history critical care time: 10  minutes  Ordering / reviewing critical care time: 5 minutes  Documentation critical care time: 5 minutes  Total critical care time (exclusive of procedural time) : 35 minutes        Labs Reviewed   CBC W/ AUTO DIFFERENTIAL - Abnormal; Notable for the following:        Result Value    RBC 3.99 (*)     Hemoglobin 11.8 (*)     MCV 99 (*)     MCHC 30.0 (*)     RDW 15.5 (*)     Gran # 7.9 (*)     Gran% 76.4 (*)     Lymph% 17.4 (*)     All other components within normal limits   POCT GLUCOSE - Abnormal; Notable for the following:     POCT Glucose 188 (*)     All other components within normal limits   ISTAT PROCEDURE - Abnormal; Notable for the following:     POC PH 7.288 (*)     POC PCO2 83.4 (*)     POC HCO3 39.9 (*)     POC SATURATED O2 94 (*)     POC TCO2 42 (*)     All other components within normal limits   CULTURE, BLOOD   CULTURE, BLOOD   LACTIC ACID, PLASMA   COMPREHENSIVE METABOLIC PANEL   INFLUENZA A AND B ANTIGEN   CK   TROPONIN I   PROTIME-INR   TSH   B-TYPE NATRIURETIC PEPTIDE   OCCULT BLOOD X 1, STOOL   URINALYSIS   B-TYPE NATRIURETIC PEPTIDE   POCT GLUCOSE MONITORING CONTINUOUS     EKG Readings: (Independently Interpreted)   Initial Reading: No STEMI. Rhythm: Normal Sinus Rhythm. Heart Rate: 84. Ectopy: PVCs. Axis: Left Axis Deviation.          Medical Decision Making:   History:   I obtained history from: someone other than patient.       <> Summary of History: nephew  Old Medical Records: I decided to obtain old medical records.  Old Records Summarized: records from clinic visits and records from previous admission(s).  Other:   I have discussed this case with another health care provider.       <> Summary of the Discussion: 10:27 PM case discussed with NP for [facility] to admit    Additional MDM:   Comments: 77-year-old obese diabetic oxygen dependent female who has not been compliant with her CPAP a who comes in with increased sleepiness possibly related to her malfunctioning cannula at home and  inability to keep her oxygen consistently at the correct level.  She also has 3 dogs and had a scrape on her left leg that has become cellulitic.  She also has a large erythematous moist area on her abdominal pannus looks consistent with fungal infection.  Patient's ABG reviewed.  Chest x-ray reviewed.  Patient has no signs of DVT.  She will be admitted for her cellulitis and get education on all oxygen use at home including CPAP machine and get functioning canula for home use..                 ED Course     Clinical Impression:   The primary encounter diagnosis was Cellulitis of left lower extremity. Diagnoses of SOB (shortness of breath), Hypercapnia, Cellulitis, and Acute on chronic respiratory failure with hypercapnia were also pertinent to this visit.    Disposition:   Disposition: Admitted  Condition: Stable                        Rafaela Sylvester MD  08/20/17 5434

## 2017-08-21 NOTE — ASSESSMENT & PLAN NOTE
BUN 27, Creatinine 1.1. Shows some dehydration  --Normal Saline 100 ml/hr  --Avoid nephrotoxins and renally dose medications

## 2017-08-21 NOTE — ASSESSMENT & PLAN NOTE
1/25/17 HgA1c 7.5. Blood Sugar on Admit 109. Takes glipizide 5 mg daily. Holding  Check blood glucose AC and HS and use SSI.  Check A1c.  Diabetic diet.

## 2017-08-21 NOTE — SUBJECTIVE & OBJECTIVE
Interval History: Continued on BiPAP overnight with minimal improvement in the pCO2. Remains lethargic this AM.     Review of Systems   Unable to perform ROS: Mental status change     Objective:     Vital Signs (Most Recent):  Temp: 98 °F (36.7 °C) (08/21/17 1233)  Pulse: 78 (08/21/17 1800)  Resp: (!) 39 (08/21/17 1700)  BP: (!) 199/77 (08/21/17 1800)  SpO2: 97 % (08/21/17 1800) Vital Signs (24h Range):  Temp:  [97.4 °F (36.3 °C)-99 °F (37.2 °C)] 98 °F (36.7 °C)  Pulse:  [57-98] 78  Resp:  [10-45] 39  SpO2:  [86 %-100 %] 97 %  BP: (102-200)/(47-96) 199/77     Weight: (!) 150 kg (330 lb 11 oz)  Body mass index is 53.37 kg/m².  No intake or output data in the 24 hours ending 08/21/17 1813   Physical Exam   Constitutional: She appears well-developed and well-nourished. She appears lethargic. She appears distressed. Face mask in place.   HENT:   Head: Normocephalic and atraumatic.   Cardiovascular: Normal rate and regular rhythm.    No murmur heard.  Pulmonary/Chest: Tachypnea noted. She is in respiratory distress. She has decreased breath sounds. She has no wheezes.   Abdominal: Soft. Bowel sounds are normal. She exhibits no distension. There is no tenderness.   Musculoskeletal: She exhibits edema.   Erythema outlined on the LLE   Neurological: She appears lethargic.   Skin: Skin is warm and dry.   Nursing note and vitals reviewed.      Significant Labs:   ABGs:   Recent Labs  Lab 08/21/17  1218   PH 7.292*   PCO2 84.5*   HCO3 40.8*   POCSATURATED 89*   BE 14     CBC:   Recent Labs  Lab 08/20/17  1845   WBC 10.36   HGB 11.8*   HCT 39.3        CMP:   Recent Labs  Lab 08/20/17  1845 08/21/17  0411    144   K 4.4 4.2   CL 97 99   CO2 37* 40*    110   BUN 27* 26*   CREATININE 1.1 1.2   CALCIUM 9.1 9.1   PROT 7.8  --    ALBUMIN 2.9*  --    BILITOT 0.4  --    ALKPHOS 114  --    AST 16  --    ALT 14  --    ANIONGAP 8 5*   EGFRNONAA 48* 43*     Magnesium:   Recent Labs  Lab 08/20/17  2217   MG 2.1     POCT  Glucose:   Recent Labs  Lab 08/20/17  1822 08/21/17  0604 08/21/17  0953   POCTGLUCOSE 188* 131* 140*       Significant Imaging: I have reviewed all pertinent imaging results/findings within the past 24 hours.

## 2017-08-21 NOTE — CONSULTS
[facility] Pulmonology/Critical Care Consult - Resident Note    Primary Attending Physician: Dr. Junior  Primary Team: Internal Medicine  Consultant Attending: Dr. Azul  Consultant Resident: Dr. Montana    Reason for Consult:     Mixed Hypercapnic and Hypoxemic respiratory failure    Subjective:      History of Present Illness:  Adela Jessica is a 77 y.o.  female with a medical history of CKD, Dementia, DMII, GERD, HLD, HTN and HIWOT (should on CPAP at night but does not have a machine). She uses O2 at home.     History obtained from family and per the medical record.     She presented after 3 days of acting confused at home and was somnolent and generally weak. 2 days ago, her family called EMS who came to the house and found patient to be hypoxic, however patient did not want to come to the ED she had her O2 increased to 4LPM at home. She became more lethargic and confused so family brought her to the ED yesterday evening.     She was found to have significant respiratory acidosis on ABG that suggested acute on chronic Hypercapnia, patient was placed on CPAP (rather than BIPAP) in the ED.     Of note, patient has also been complaining of LLE pain, warmth and swelling for the past week. Denies any fevers or chills.     Today she is on bipap and is somnolent but arousable to touch, RICK -2, able to state name, location and .       Past Medical History:  Past Medical History:   Diagnosis Date    Arthritis     CKD (chronic kidney disease)     Dementia     Depression     DM2 (diabetes mellitus, type 2)     GERD (gastroesophageal reflux disease)     HLD (hyperlipidemia)     HTN (hypertension)     HIWOT on CPAP        Past Surgical History:  Past Surgical History:   Procedure Laterality Date    HYSTERECTOMY         Allergies:  Review of patient's allergies indicates:  No Known Allergies    Medications:   In-Hospital Scheduled Medications:      In-Hospital PRN Medications:     In-Hospital IV Infusion  Medications:   sodium chloride 0.9% 100 mL/hr at 08/21/17 0626      Home Medications:  Prior to Admission medications    Medication Sig Start Date End Date Taking? Authorizing Provider   albuterol (PROVENTIL/VENTOLIN) 90 mcg/actuation inhaler Inhale 2 puffs into the lungs every 6 (six) hours as needed for Wheezing. 11/13/13 3/6/16  Quang Hernández MD   amlodipine (NORVASC) 5 MG tablet Take 1 tablet (5 mg total) by mouth once daily. 1/30/17 1/30/18  Hawa Blue MD   atorvastatin (LIPITOR) 40 MG tablet Take 1 tablet (40 mg total) by mouth once daily. 1/30/17 1/30/18  Hawa Blue MD   donepezil (ARICEPT) 5 MG tablet Take 5 mg by mouth every evening.    Historical Provider, MD   duloxetine (CYMBALTA) 60 MG capsule Take 1 capsule (60 mg total) by mouth once daily. 1/30/17   Hawa Blue MD   fish oil-omega-3 fatty acids 300-1,000 mg capsule Take 2 g by mouth once daily.    Historical Provider, MD   fluticasone-salmeterol 100-50 mcg/dose (ADVAIR) 100-50 mcg/dose diskus inhaler Inhale 1 puff into the lungs 2 (two) times daily.    Historical Provider, MD   glipiZIDE (GLUCOTROL) 5 MG tablet Take 0.5 tablets (2.5 mg total) by mouth daily with breakfast. 1/30/17   Hawa Blue MD   multivitamin (THERAGRAN) per tablet Take 1 tablet by mouth once daily.    Historical Provider, MD   omega-3 acid ethyl esters (LOVAZA) 1 gram capsule Take 2 g by mouth 2 (two) times daily.    Historical Provider, MD   pentoxifylline (TRENTAL) 400 mg TbSR Take 400 mg by mouth 2 (two) times daily with meals.    Historical Provider, MD   pregabalin (LYRICA) 50 MG capsule Take 50 mg by mouth 3 (three) times daily.    Historical Provider, MD   aspirin 325 MG tablet Take 325 mg by mouth once daily.  8/21/17  Historical Provider, MD       Family History:  History reviewed. No pertinent family history.    Social History:  Social History   Substance Use Topics    Smoking status: Never Smoker    Smokeless tobacco: Never Used    Alcohol use No       Review  "of Systems:  Pertinent items are noted in HPI. All other systems are reviewed and are negative.       Objective:   Last 24 Hour Vital Signs:  BP  Min: 102/70  Max: 210/90  Temp  Av.1 °F (36.7 °C)  Min: 97.4 °F (36.3 °C)  Max: 99 °F (37.2 °C)  Pulse  Av.1  Min: 67  Max: 90  Resp  Av.6  Min: 18  Max: 28  SpO2  Av.2 %  Min: 77 %  Max: 100 %  Height  Av' 3" (160 cm)  Min: 5' 3" (160 cm)  Max: 5' 3" (160 cm)  Weight  Av.2 kg (320 lb)  Min: 145.2 kg (320 lb)  Max: 145.2 kg (320 lb)  No intake/output data recorded.  Body mass index is 56.69 kg/m².    Physical Examination:  General: NAD, resting in bed comfortably.   HEENT: PERRL EOMI. Moist mucus membranes.. Without sclera erythema/jaundice.   CV: RRR, difficult to auscultate 2/2 body habitus. . Distal pulses 2+, symmetric.   RESP: CTABL. Possibly mild expiratory wheezes, difficult to auscultate with BIPAP and 2/2 body habitus.   Symmetric. On BIPAP   Abdomen: s/nt/nd.   Extremities: 1+ peripheral edema bilat LE, LLE with erythema and warmth to mid shin, ttp   Neuro: RICK -2, Somnolent but arousable, alert and oriented to person, place, and .. Moving all 4 extremities spontaneously   Skin: skin findings on LLE as above, area of erythema under panus          Laboratory Results:  Most Recent Data:  CBC: Lab Results   Component Value Date    WBC 10.36 2017    HGB 11.8 (L) 2017    HCT 39.3 2017     2017    MCV 99 (H) 2017    RDW 15.5 (H) 2017       BMP: Lab Results   Component Value Date     2017    K 4.2 2017    CL 99 2017    CO2 40 (H) 2017    BUN 26 (H) 2017    CREATININE 1.2 2017     2017    CALCIUM 9.1 2017    MG 2.1 2017    PHOS 5.5 (H) 2017     LFTs: Lab Results   Component Value Date    PROT 7.8 2017    ALBUMIN 2.9 (L) 2017    BILITOT 0.4 2017    AST 16 2017    ALKPHOS 114 2017    ALT 14 " 08/20/2017     Coags:   Lab Results   Component Value Date    INR 0.9 08/20/2017     FLP: Lab Results   Component Value Date    CHOL 113 (L) 01/25/2017    HDL 20 (L) 01/25/2017    LDLCALC 68.2 01/25/2017    TRIG 124 01/25/2017    CHOLHDL 17.7 (L) 01/25/2017     DM: Lab Results   Component Value Date    HGBA1C 7.5 (H) 01/25/2017    HGBA1C 6.0 11/11/2013    LDLCALC 68.2 01/25/2017    CREATININE 1.2 08/21/2017     Thyroid: Lab Results   Component Value Date    TSH 2.033 08/20/2017     Anemia: Lab Results   Component Value Date    IRON 46 01/25/2017    TIBC 185 (L) 01/25/2017    FERRITIN 434 (H) 01/25/2017    CHOLZFUM37 887 01/25/2017    FOLATE 11.7 01/25/2017     Cardiac: Lab Results   Component Value Date    TROPONINI <0.006 08/20/2017    BNP 83 08/20/2017    BNP 83 08/20/2017     Urinalysis: Lab Results   Component Value Date    LABURIN No growth 01/25/2017    COLORU Yellow 08/20/2017    SPECGRAV 1.025 08/20/2017    NITRITE Negative 08/20/2017    KETONESU Negative 08/20/2017    UROBILINOGEN 1.0 08/20/2017       Trended Lab Data:    Recent Labs  Lab 08/20/17  1845 08/21/17  0411   WBC 10.36  --    HGB 11.8*  --    HCT 39.3  --      --    MCV 99*  --    RDW 15.5*  --     144   K 4.4 4.2   CL 97 99   CO2 37* 40*   BUN 27* 26*   CREATININE 1.1 1.2    110   PROT 7.8  --    ALBUMIN 2.9*  --    BILITOT 0.4  --    AST 16  --    ALKPHOS 114  --    ALT 14  --        Trended Cardiac Data:    Recent Labs  Lab 08/20/17 1845   TROPONINI <0.006   BNP 83  83       Microbiology Data:  Bcx- prelim NG     Other Laboratory Data:      Other Results:      Radiology:  X-ray Chest 1 View    Result Date: 8/20/2017  Chest AP single view.  Comparison: 1/27/17 Cardiac silhouette is upper limits of normal but stable size.  Lungs are symmetrically expanded.  Prominence of central pulmonary vasculature with increased bilateral interstitial markings and perihilar opacity suggestive for mild pulmonary edema.  No evidence of  focal consolidative process, pneumothorax, or significant effusion.  Bones show DJD.  No free air visualized beneath the diaphragm.    Pulmonary vascular congestion with mild pulmonary edema. Electronically signed by: DEWAYNE CORNEJO MD Date:     08/20/17 Time:    18:36     X-ray Tibia Fibula 2 View Left    Result Date: 8/20/2017  Left tibia-fibula 2 views.  Comparison: None. No evidence of fracture, dislocation, or osseous destructive process.  Degenerative changes are visualized at the ankle.  There is significant soft tissue swelling at the ankle.  Large posterior calcaneal spur visualized.  Mild degenerative changes seen of the knee.  No suprapatellar joint effusion.    No acute osseous abnormality identified. Electronically signed by: DEWAYNE CORNEJO MD Date:     08/20/17 Time:    22:42     Ct Head Without Contrast    Result Date: 8/20/2017  COMPARISON: None FINDINGS: Patient is rotated and tilted within the scanner. The brain appears normally formed without evidence of hemorrhage, mass, midline shift or acute major vascular territory infarct.  Gray-white interface appears intact.  The ventricular system is normal in size for age and demonstrates no distortion by mass effect.  No extra-axial hemorrhage or mass. There is a 1.5 cm or circumscribed, homogeneous, round mass with peripheral calcification located within the subcutaneous soft tissues of the right frontal scalp near the vertex, while nonspecific suggests a complex sebaceous cyst. Hyperostosis frontalis interna. No displaced calvarial fracture.  Imaged portions of the paranasal sinuses and mastoid air cells are clear. Probable prior surgery to the bilateral ocular lenses.    No acute major vascular territory infarct or intracranial hemorrhage identified.  Further evaluation/followup as warranted. Superior right frontal scalp 1.5 cm probable sebaceous cyst. Correlate clinically. Electronically signed by: CRISTIANO PACE MD, MD Date:     08/20/17 Time:    20:56      Us Lower Extremity Veins Bilateral    Result Date: 8/20/2017  BILATERAL LOWER EXTREMITY DUPLEX ULTRASOUND. Technique: The bilateral lower extremity deep venous system was imaged by ultrasound from the groin to the upper calf.  Veins were analyzed with grayscale, transducer compression, color doppler, and doppler spectral analysis.  Comparison: Bilateral lower extremity venous Doppler ultrasound 11/11/13 FINDINGS: Study is technically limited by body habitus. RIGHT LEG: The imaged common femoral, superficial femoral, popliteal, peroneal and anterior and posterior tibial veins show no signs of deep vein thrombosis.  The imaged common femoral, superficial femoral and popliteal veins were examined using spectral analysis and show normal wave forms with normal response to augmentation and respiration. LEFT LEG: The imaged common femoral, superficial femoral, popliteal, peroneal and anterior and posterior tibial veins show no signs of deep vein thrombosis.  The imaged common femoral, superficial femoral and popliteal veins were examined using spectral analysis and show normal wave forms with normal response to augmentation and respiration. Nonspecific mild subcutaneous edema at the left calf region.     No evidence of deep venous thrombosis in either lower extremity. Left calf nonspecific mild subcutaneous edema. Electronically signed by: CRISTIANO PACE MD, MD Date:     08/20/17 Time:    20:14           Antibiotics and Day Number of Therapy:  Zosyn 8/20-  Bactrim 8/20-    Lines and Day Number of Therapy:  PIV     Assessment:     Adela Jessica is a 77 y.o. female with:  Patient Active Problem List    Diagnosis Date Noted    Acute respiratory acidosis 08/21/2017    Yeast infection of the skin 08/21/2017    Supplemental oxygen dependent 08/21/2017    Cellulitis 08/20/2017    HIWOT and COPD overlap syndrome 08/20/2017    Hypomagnesemia 01/28/2017    Physical deconditioning 01/28/2017    Pulmonary edema with congestive  heart failure 2017    Streptococcal pneumonia 2017    Centrilobular emphysema 2017    SUBHASH (acute kidney injury) 2017    Morbid obesity due to excess calories 2017    Encephalopathy acute 2017    Uremia 2017    Tinea pedis of both feet 2017    Normocytic anemia 2017    Severe sepsis with acute organ dysfunction 2017    Alzheimer's dementia without behavioral disturbance 2017    Hyponatremia 2017    Shock 2017    Hyperkalemia 2013    CKD (chronic kidney disease) stage 4, GFR 15-29 ml/min 2013    Leukocytosis 2013    Type 2 diabetes mellitus treated without insulin 2013    COPD (chronic obstructive pulmonary disease) 2013    Essential hypertension 2013    Mixed hyperlipidemia 2013    Troponin I above reference range 2013    Acute on chronic respiratory failure with hypoxia and hypercapnia 11/10/2013        Recommendations:     Neuro:  -History of dementia- on donepezil 5mg qHS  -patient somnolent however RICK -2 arousable to touch and voice, alert and oriented to person, place and   -continue to monitor as PCO2 improves    CV  -Last ECHO 2017 with mildly reduced EF 50%, /60 in ED  -However getting continuous IV fluids, would discontinue at this time to avoid volume overload    Resp:  Acute on Chronic Hypercapnic Respiratory Failure  -Patient on 2L home O2, with past 3 days of confusion and generalized weakness, had Home O2 increased to 4L for hypoxia by EMS 2 days ago, with subsequent worsening confusion  -BNP 83, no hx HF, CXR with mildly increased pulm vasculature, appears consistent with previous CXR's, no focal opacities   -Likely Patient with chronic hypercapnia from OHS +/- HIWOT and needs CPAP at home, now with acute issue possibly 2/2 infection vs increase in Home O2 to 4L after NC found to not be working properly vs medication side effect?  -Presenting ABG:  [id number]%  -Placed on CPAP repeat AB.224 [id number]%  -Worsening hypercapnia on CPAP, was switched to BIPAP and we will adjust settings as needed- increasing IPAP to 22 and decreasing FiO2 to 30% from 40%, get repeat ABG    -Would get repeat ABG today, if CO2 continues to worsen on BIPAP would consider trial of lasix for possible pulmonary edema, although patient does not appear volume overloaded and EF 50% 2017 with BNP 83 this admission  -Do not feel patient needs steroids at this time    Renal:  -Patient with known CKD 4, Cr 1.2 currently, continue to avoid nephrotoxic agents  -as above, recommending discontinue IVF at this time to avoid volume overload, patient does not appear hypovolemic per exam     Heme/ID:   -WBC normal 10.36, afebrile, however with LLE erythema with possible cellulitis?   -Would deescalate antibiotics, unless there is suspicion for ongoing infection   -Would continue topical treatment for likely candidal skin infection   -History of anemia, FOBT pos, H/H is near patient's baseline 11.8/39.3, monitor    Endocrine:   -Last HbA1c 7.5, continue SSI and monitor blood glucose     PPx: SCD, holding AC for concern possible occult GIB?      Thank you for allowing us to participate in the care of this patient. Please call with any questions regarding this consult.    Radha Montana  [facility] Internal Medicine HO-2  [facility] Pulmonology/Critical Care Service    Pt seen and examined with Pulmonary/Critical Care team and this note reviewed and validated with the following additional comments:    Most likely has OHS/HIWOT.  Not sure if she has COPD.  No spirometry in chart.  No wheezing. Hard to know if she has significant extra body water. Likely has some degree of HFpEF as BNP has been elevated in the past and is probably high for her body habitus. At this point she is ventilating and oxygenating adequately.  Improvement could be slow.  Permissive underfeeding by about 500 kCal/day. Crit  Care time 30 min.    Reggie Fox MD  Phone [phone]

## 2017-08-21 NOTE — ASSESSMENT & PLAN NOTE
Left Lower Extremity due to dog scratch. Mild. See Picture. Given piperacillin-tazobactam 4.5 g.  Will treat with oral sulfamethoxazole-trimethoprim 800-160mg per tablet 1 tablet BID

## 2017-08-21 NOTE — ASSESSMENT & PLAN NOTE
Will start on cefazolin while on the BiPAP. Can likely transition to oral antibiotics when able to be off the BiPAP.

## 2017-08-21 NOTE — ASSESSMENT & PLAN NOTE
At Baseline. Hemoccult + Brown stool per rectal exam. Monitor hemoglobin. Does not appear to be acute GI bleeding.

## 2017-08-21 NOTE — PLAN OF CARE
Problem: Patient Care Overview  Goal: Plan of Care Review  Outcome: Ongoing (interventions implemented as appropriate)  Patient understands, and agrees with plan of care. ABCDEF Bundle. Not intubated nor sedated. Breathing on continuous BiPAP, respirations stable currently 25, lungs course pulse ox 93. Cam score negative, patient alert and oriented times 4 and can follow commands. Patient can move independently in bed, and move all extremities. Skin abrasion noted in the chart. Patient remain free from fall in injury throughout the shift. Family at bedside and involved with patient care. Patient denies pain throughout the shift. Patient remained NPO except medication. No BM. Side rails up times 2, call light in reach, will continue to monitor.

## 2017-08-21 NOTE — PROGRESS NOTES
"[facility] Hospital Medicine  Progress Note    Patient Name: Adela Ramírez  MRN: [id number]  Patient Class: IP- Inpatient   Admission Date: 8/20/2017  Length of Stay: 1 days  Attending Physician: Narinder Junior MD  Primary Care Provider: Cindy Spencer NP        Subjective:     Principal Problem:Acute on chronic respiratory failure with hypoxia and hypercapnia    HPI:  Adela Ramírez is a 77 female with a PMHx of Hypertension, Hypertension, Diabetes Type 2, Dementia, Depression, GERD, Obstructive Sleep Apnea (Should be on BiPAP, but no Machine at home, home Oxygen Dependent, and Chronic Kidney Disease. She lives with her sister.  Her PCP is Dr. Hackett.    She presented to [facility] ED with her nephew and chief complaint of "not acting right".  The nephew says that over the past 3 days patient has at times been talking without making sense has been more sleepy than usual and also generally more weak.  The nephew says that 2 days ago they called EMS and at that time EMS said that her oxygen level was low, patient refused to go to the hospital at that time, EMS gave her a new cannula as her old cannula was not operating correctly.  She was incontinent of stool last night which is not her baseline. She was placed on 4 LPM Oxygen and became more lethargic.  She had significant respiratory acidoses on ABG with PH 7.288, PCO2 83.4, PO2 84, BiCarb 39.9. She was placed on CPAP. She has cellulitis of the left lower extremity, that she states has been there about 1 week and is likely from her dog scratch.  She has an angry red, foul smelling yeast rash under her Panis, which she treats with Desitin  Cream. Per rectal exam, she had brown Hemoccult + Stool with Hgb 11.8 and Hct 39.3.  She was treated with piperacillin-tazobactam 4.5 g for her cellulitis. She is admitted to [facility] for Acute on Chronic hyercarbic respiratory failure and cellulitis to [facility].             Hospital Course:  No " notes on file    Interval History: Continued on BiPAP overnight with minimal improvement in the pCO2. Remains lethargic this AM.     Review of Systems   Unable to perform ROS: Mental status change     Objective:     Vital Signs (Most Recent):  Temp: 98 °F (36.7 °C) (08/21/17 1233)  Pulse: 78 (08/21/17 1800)  Resp: (!) 39 (08/21/17 1700)  BP: (!) 199/77 (08/21/17 1800)  SpO2: 97 % (08/21/17 1800) Vital Signs (24h Range):  Temp:  [97.4 °F (36.3 °C)-99 °F (37.2 °C)] 98 °F (36.7 °C)  Pulse:  [57-98] 78  Resp:  [10-45] 39  SpO2:  [86 %-100 %] 97 %  BP: (102-200)/(47-96) 199/77     Weight: (!) 150 kg (330 lb 11 oz)  Body mass index is 53.37 kg/m².  No intake or output data in the 24 hours ending 08/21/17 1813   Physical Exam   Constitutional: She appears well-developed and well-nourished. She appears lethargic. She appears distressed. Face mask in place.   HENT:   Head: Normocephalic and atraumatic.   Cardiovascular: Normal rate and regular rhythm.    No murmur heard.  Pulmonary/Chest: Tachypnea noted. She is in respiratory distress. She has decreased breath sounds. She has no wheezes.   Abdominal: Soft. Bowel sounds are normal. She exhibits no distension. There is no tenderness.   Musculoskeletal: She exhibits edema.   Erythema outlined on the LLE   Neurological: She appears lethargic.   Skin: Skin is warm and dry.   Nursing note and vitals reviewed.      Significant Labs:   ABGs:   Recent Labs  Lab 08/21/17  1218   PH 7.292*   PCO2 84.5*   HCO3 40.8*   POCSATURATED 89*   BE 14     CBC:   Recent Labs  Lab 08/20/17  1845   WBC 10.36   HGB 11.8*   HCT 39.3        CMP:   Recent Labs  Lab 08/20/17  1845 08/21/17  0411    144   K 4.4 4.2   CL 97 99   CO2 37* 40*    110   BUN 27* 26*   CREATININE 1.1 1.2   CALCIUM 9.1 9.1   PROT 7.8  --    ALBUMIN 2.9*  --    BILITOT 0.4  --    ALKPHOS 114  --    AST 16  --    ALT 14  --    ANIONGAP 8 5*   EGFRNONAA 48* 43*     Magnesium:   Recent Labs  Lab 08/20/17  9800    MG 2.1     POCT Glucose:   Recent Labs  Lab 08/20/17  1822 08/21/17  0604 08/21/17  0953   POCTGLUCOSE 188* 131* 140*       Significant Imaging: I have reviewed all pertinent imaging results/findings within the past 24 hours.    Assessment/Plan:      * Acute on chronic respiratory failure with hypoxia and hypercapnia    COPD with acute exacerbation  Obstructive Sleep Apnea  Acute Encephalopathy  Respiratory Acidosis,  Home Oxygen Dependent  Continue BiPAP currently. Pulmonary consulted. Continue A/A nebs. Start on steroids. Monitor mental status. Morbidly obese patient with history of sleep apnea requiring CPAP machine, but does not have CPAP machine at home, so will need to look into obtaining one prior to discharge. Continue ICU monitoring.        Yeast infection of the skin    Cleans BID and apply nystatin-triamcinolone ointment TID          Cellulitis of left lower extremity    Will start on cefazolin while on the BiPAP. Can likely transition to oral antibiotics when able to be off the BiPAP.           Alzheimer's dementia without behavioral disturbance    Lethargic currently. Continue to monitor. Continue donepezil 5 mg every HS          Normocytic anemia    At Baseline. Hemoccult + Brown stool per rectal exam. Monitor hemoglobin. Does not appear to be acute GI bleeding.         Morbid obesity due to excess calories    BMI 56.7.  Nutritional Counselling          Mixed hyperlipidemia    Continue home atorvastatin 40 mg daily          Essential hypertension    Continue home amlodipine 5 mg daily. Hydralazine prn.           Type 2 diabetes mellitus treated without insulin    1/25/17 HgA1c 7.5. Check A1c.Takes glipizide 5 mg daily. Holding. Check blood glucose AC and HS and use SSI.   Diabetic diet when taking po.         CKD (chronic kidney disease) stage 3, GFR 30-59 ml/min    Creatinine is at her baseline currently. Received a little IV fluids in the ED. Monitor.             VTE Risk Mitigation         Ordered      enoxaparin injection 40 mg  Daily     Route:  Subcutaneous        08/21/17 0738     High Risk of VTE  Once      08/21/17 0738          Critical care time spent on the evaluation and treatment of severe organ dysfunction, review of pertinent labs and imaging studies, discussions with consulting providers and discussions with patient/family: 35 minutes.    Narinder Junior MD  Department of Hospital Medicine   [facility]

## 2017-08-21 NOTE — SUBJECTIVE & OBJECTIVE
Past Medical History:   Diagnosis Date    Arthritis     CKD (chronic kidney disease)     Dementia     Depression     DM2 (diabetes mellitus, type 2)     GERD (gastroesophageal reflux disease)     HLD (hyperlipidemia)     HTN (hypertension)     HIWOT on CPAP        Past Surgical History:   Procedure Laterality Date    HYSTERECTOMY         Review of patient's allergies indicates:  No Known Allergies    Current Facility-Administered Medications on File Prior to Encounter   Medication    etomidate injection    propofol (DIPRIVAN) 10 mg/mL infusion    succinylcholine injection     Current Outpatient Prescriptions on File Prior to Encounter   Medication Sig    albuterol (PROVENTIL/VENTOLIN) 90 mcg/actuation inhaler Inhale 2 puffs into the lungs every 6 (six) hours as needed for Wheezing.    amlodipine (NORVASC) 5 MG tablet Take 1 tablet (5 mg total) by mouth once daily.    atorvastatin (LIPITOR) 40 MG tablet Take 1 tablet (40 mg total) by mouth once daily.    donepezil (ARICEPT) 5 MG tablet Take 5 mg by mouth every evening.    duloxetine (CYMBALTA) 60 MG capsule Take 1 capsule (60 mg total) by mouth once daily.    fish oil-omega-3 fatty acids 300-1,000 mg capsule Take 2 g by mouth once daily.    fluticasone-salmeterol 100-50 mcg/dose (ADVAIR) 100-50 mcg/dose diskus inhaler Inhale 1 puff into the lungs 2 (two) times daily.    glipiZIDE (GLUCOTROL) 5 MG tablet Take 0.5 tablets (2.5 mg total) by mouth daily with breakfast.    multivitamin (THERAGRAN) per tablet Take 1 tablet by mouth once daily.    omega-3 acid ethyl esters (LOVAZA) 1 gram capsule Take 2 g by mouth 2 (two) times daily.    pentoxifylline (TRENTAL) 400 mg TbSR Take 400 mg by mouth 2 (two) times daily with meals.    pregabalin (LYRICA) 50 MG capsule Take 50 mg by mouth 3 (three) times daily.    [DISCONTINUED] aspirin 325 MG tablet Take 325 mg by mouth once daily.     Family History     None        Social History Main Topics    Smoking  status: Never Smoker    Smokeless tobacco: Never Used    Alcohol use No    Drug use: No    Sexual activity: Not on file     Review of Systems   Constitutional: Positive for activity change and fatigue. Negative for chills, diaphoresis and fever.   HENT: Negative for sore throat and trouble swallowing.    Eyes: Negative for photophobia and visual disturbance.   Respiratory: Positive for shortness of breath. Negative for cough and wheezing.    Cardiovascular: Negative for chest pain and palpitations.   Gastrointestinal: Negative for abdominal pain, constipation, diarrhea, nausea and vomiting.   Endocrine: Negative for polydipsia and polyphagia.   Genitourinary: Negative for decreased urine volume, dysuria, hematuria and urgency.   Musculoskeletal: Negative for joint swelling, neck pain and neck stiffness.   Skin: Positive for rash (Under Panis) and wound (Red left lower leg, ).   Neurological: Negative for weakness, numbness and headaches.   Psychiatric/Behavioral: Negative for agitation, dysphoric mood and suicidal ideas.     Objective:     Vital Signs (Most Recent):  Temp: 97.7 °F (36.5 °C) (08/21/17 0256)  Pulse: 81 (08/21/17 0256)  Resp: (!) 23 (08/21/17 0256)  BP: (!) 154/68 (08/21/17 0256)  SpO2: 95 % (08/21/17 0256) Vital Signs (24h Range):  Temp:  [97.6 °F (36.4 °C)-99 °F (37.2 °C)] 97.7 °F (36.5 °C)  Pulse:  [71-90] 81  Resp:  [20-28] 23  SpO2:  [77 %-100 %] 95 %  BP: (102-210)/(47-92) 154/68     Weight: (!) 145.2 kg (320 lb)  Body mass index is 56.69 kg/m².    Physical Exam   Constitutional: She is oriented to person, place, and time. She appears lethargic. She is easily aroused. She appears ill. She appears distressed. Face mask in place.       Obese  On CPAP   HENT:   Head: Normocephalic and atraumatic.   Mouth/Throat: Oropharynx is clear and moist. No oropharyngeal exudate.   Eyes: Conjunctivae are normal. Pupils are equal, round, and reactive to light. No scleral icterus.   Neck: Neck supple.    Cardiovascular: Normal rate, regular rhythm, normal heart sounds and intact distal pulses.  Exam reveals no gallop and no friction rub.    No murmur heard.  Pulmonary/Chest: Breath sounds normal. She is in respiratory distress (Mild). She has no wheezes. She has no rales.   Abdominal: Soft. Bowel sounds are normal. She exhibits no distension. There is no tenderness. There is no rebound and no guarding.   Musculoskeletal: She exhibits no edema, tenderness or deformity.   Neurological: She is oriented to person, place, and time and easily aroused. She appears lethargic. She exhibits normal muscle tone.   Skin: Skin is warm and dry. Capillary refill takes 2 to 3 seconds. Rash (See Picture) noted. She is not diaphoretic.   Red Left Lower Leg. See Picture   Psychiatric: She has a normal mood and affect. Her behavior is normal.   Nursing note and vitals reviewed.      Left Leg                                                            Abdomen, Under Panis     Significant Labs:   ABGs:   Recent Labs  Lab 08/20/17  1853   PH 7.288*   PCO2 83.4*   HCO3 39.9*   POCSATURATED 94*   BE 13     CBC:   Recent Labs  Lab 08/20/17  1845   WBC 10.36   HGB 11.8*   HCT 39.3        CMP:   Recent Labs  Lab 08/20/17  1845      K 4.4   CL 97   CO2 37*      BUN 27*   CREATININE 1.1   CALCIUM 9.1   PROT 7.8   ALBUMIN 2.9*   BILITOT 0.4   ALKPHOS 114   AST 16   ALT 14   ANIONGAP 8   EGFRNONAA 48*     Status:  Final result    Ref Range & Units 08/20/17 2148   Occult Blood Negative  Positive          Ref Range & Units 08/20/17 1849   Influenza A Ag, EIA Negative  Negative   Influenza B Ag, EIA Negative  Negative      Ref Range & Units 08/20/17 1845   Lactate (Lactic Acid) 0.5 - 2.2 mmol/L 0.8         Ref Range & Units 08/20/17 2217   Lipase 4 - 60 U/L 19     Final result    Ref Range & Units 08/20/17 2217   Procalcitonin <0.25 ng/mL 0.08          Cardiac Markers:   Recent Labs  Lab 08/20/17  1845   BNP 83  83      Coagulation:   Recent Labs  Lab 08/20/17  1845   INR 0.9     Troponin:   Recent Labs  Lab 08/20/17 1845   TROPONINI <0.006     TSH:   Recent Labs  Lab 08/20/17  2217   TSH 2.033     Urine Studies:   Recent Labs  Lab 08/20/17 2125   COLORU Yellow   APPEARANCEUA Hazy*   PHUR 6.0   SPECGRAV 1.025   PROTEINUA 1+*   GLUCUA Negative   KETONESU Negative   BILIRUBINUA Negative   OCCULTUA 2+*   NITRITE Negative   UROBILINOGEN 1.0   LEUKOCYTESUR Trace*   RBCUA 2   WBCUA 1   BACTERIA Rare   SQUAMEPITHEL 4   HYALINECASTS 0       Significant Imaging: I have reviewed all pertinent imaging results/findings within the past 24 hours.   Imaging Results          X-Ray Tibia Fibula 2 View Left (Final result)  Result time 08/20/17 22:42:50    Final result by Dewayne Cornejo MD (08/20/17 22:42:50)                 Impression:      No acute osseous abnormality identified.      Electronically signed by: DEWAYNE CORNEJO MD  Date:     08/20/17  Time:    22:42              Narrative:    Left tibia-fibula 2 views.  Comparison: None.    No evidence of fracture, dislocation, or osseous destructive process.  Degenerative changes are visualized at the ankle.  There is significant soft tissue swelling at the ankle.  Large posterior calcaneal spur visualized.  Mild degenerative changes seen of the knee.  No suprapatellar joint effusion.                             CT Head Without Contrast (Final result)  Result time 08/20/17 20:56:31    Final result by Cristiano Tripp MD (08/20/17 20:56:31)                 Impression:        No acute major vascular territory infarct or intracranial hemorrhage identified.  Further evaluation/followup as warranted.    Superior right frontal scalp 1.5 cm probable sebaceous cyst. Correlate clinically.      Electronically signed by: CRISTIANO TRIPP MD, MD  Date:     08/20/17  Time:    20:56              Narrative:    COMPARISON: None    FINDINGS: Patient is rotated and tilted within the scanner. The brain appears  normally formed without evidence of hemorrhage, mass, midline shift or acute major vascular territory infarct.  Gray-white interface appears intact.  The ventricular system is normal in size for age and demonstrates no distortion by mass effect.  No extra-axial hemorrhage or mass.    There is a 1.5 cm or circumscribed, homogeneous, round mass with peripheral calcification located within the subcutaneous soft tissues of the right frontal scalp near the vertex, while nonspecific suggests a complex sebaceous cyst. Hyperostosis frontalis interna. No displaced calvarial fracture.  Imaged portions of the paranasal sinuses and mastoid air cells are clear. Probable prior surgery to the bilateral ocular lenses.                             US Lower Extremity Veins Bilateral (Final result)  Result time 08/20/17 20:14:39    Final result by Cristiano Tripp MD (08/20/17 20:14:39)                 Impression:         No evidence of deep venous thrombosis in either lower extremity.    Left calf nonspecific mild subcutaneous edema.            Electronically signed by: CRISTIANO TRIPP MD, MD  Date:     08/20/17  Time:    20:14              Narrative:    BILATERAL LOWER EXTREMITY DUPLEX ULTRASOUND.    Technique: The bilateral lower extremity deep venous system was imaged by ultrasound from the groin to the upper calf.  Veins were analyzed with grayscale, transducer compression, color doppler, and doppler spectral analysis.      Comparison: Bilateral lower extremity venous Doppler ultrasound 11/11/13    FINDINGS: Study is technically limited by body habitus.    RIGHT LEG:  The imaged common femoral, superficial femoral, popliteal, peroneal and anterior and posterior tibial veins show no signs of deep vein thrombosis.  The imaged common femoral, superficial femoral and popliteal veins were examined using spectral analysis and show normal wave forms with normal response to augmentation and respiration.    LEFT LEG:  The imaged common  femoral, superficial femoral, popliteal, peroneal and anterior and posterior tibial veins show no signs of deep vein thrombosis.  The imaged common femoral, superficial femoral and popliteal veins were examined using spectral analysis and show normal wave forms with normal response to augmentation and respiration. Nonspecific mild subcutaneous edema at the left calf region.                             X-Ray Chest 1 View (Final result)  Result time 08/20/17 18:36:43    Final result by Dewayne Cornejo MD (08/20/17 18:36:43)                 Impression:      Pulmonary vascular congestion with mild pulmonary edema.      Electronically signed by: DEWAYNE CORNEJO MD  Date:     08/20/17  Time:    18:36              Narrative:    Chest AP single view.  Comparison: 1/27/17    Cardiac silhouette is upper limits of normal but stable size.  Lungs are symmetrically expanded.  Prominence of central pulmonary vasculature with increased bilateral interstitial markings and perihilar opacity suggestive for mild pulmonary edema.  No evidence of focal consolidative process, pneumothorax, or significant effusion.  Bones show DJD.  No free air visualized beneath the diaphragm.

## 2017-08-21 NOTE — ASSESSMENT & PLAN NOTE
1/25/17 HgA1c 7.5. Check A1c.Takes glipizide 5 mg daily. Holding. Check blood glucose AC and HS and use SSI.   Diabetic diet when taking po.

## 2017-08-21 NOTE — ASSESSMENT & PLAN NOTE
Patient is oriented on my exam, but admits to memory difficulties. She lives with her sister who helps her.  --Continue donepezil 5 mg every HS

## 2017-08-22 PROBLEM — F02.80 ALZHEIMER'S DEMENTIA WITHOUT BEHAVIORAL DISTURBANCE: Chronic | Status: ACTIVE | Noted: 2017-01-25

## 2017-08-22 PROBLEM — N19 UREMIA: Status: RESOLVED | Noted: 2017-01-25 | Resolved: 2017-08-22

## 2017-08-22 PROBLEM — A41.9 SEVERE SEPSIS WITH ACUTE ORGAN DYSFUNCTION: Status: RESOLVED | Noted: 2017-01-25 | Resolved: 2017-08-22

## 2017-08-22 PROBLEM — J15.4 STREPTOCOCCAL PNEUMONIA: Status: RESOLVED | Noted: 2017-01-27 | Resolved: 2017-08-22

## 2017-08-22 PROBLEM — G30.9 ALZHEIMER'S DEMENTIA WITHOUT BEHAVIORAL DISTURBANCE: Chronic | Status: ACTIVE | Noted: 2017-01-25

## 2017-08-22 PROBLEM — J43.2 CENTRILOBULAR EMPHYSEMA: Chronic | Status: ACTIVE | Noted: 2017-01-26

## 2017-08-22 PROBLEM — G93.40 ENCEPHALOPATHY ACUTE: Status: RESOLVED | Noted: 2017-01-25 | Resolved: 2017-08-22

## 2017-08-22 PROBLEM — R65.20 SEVERE SEPSIS WITH ACUTE ORGAN DYSFUNCTION: Status: RESOLVED | Noted: 2017-01-25 | Resolved: 2017-08-22

## 2017-08-22 PROBLEM — R57.9 SHOCK: Status: RESOLVED | Noted: 2017-01-25 | Resolved: 2017-08-22

## 2017-08-22 PROBLEM — J44.9 OSA AND COPD OVERLAP SYNDROME: Chronic | Status: ACTIVE | Noted: 2017-08-20

## 2017-08-22 PROBLEM — Z99.81 SUPPLEMENTAL OXYGEN DEPENDENT: Chronic | Status: ACTIVE | Noted: 2017-08-21

## 2017-08-22 PROBLEM — E83.42 HYPOMAGNESEMIA: Status: RESOLVED | Noted: 2017-01-28 | Resolved: 2017-08-22

## 2017-08-22 PROBLEM — E87.1 HYPONATREMIA: Status: RESOLVED | Noted: 2017-01-25 | Resolved: 2017-08-22

## 2017-08-22 PROBLEM — I50.1 PULMONARY EDEMA WITH CONGESTIVE HEART FAILURE: Status: RESOLVED | Noted: 2017-01-27 | Resolved: 2017-08-22

## 2017-08-22 PROBLEM — N17.9 AKI (ACUTE KIDNEY INJURY): Status: RESOLVED | Noted: 2017-01-25 | Resolved: 2017-08-22

## 2017-08-22 PROBLEM — G47.33 OSA AND COPD OVERLAP SYNDROME: Chronic | Status: ACTIVE | Noted: 2017-08-20

## 2017-08-22 LAB
ALLENS TEST: ABNORMAL
ANION GAP SERPL CALC-SCNC: 5 MMOL/L
BASOPHILS # BLD AUTO: 0 K/UL
BASOPHILS NFR BLD: 0 %
BUN SERPL-MCNC: 28 MG/DL
CALCIUM SERPL-MCNC: 9 MG/DL
CHLORIDE SERPL-SCNC: 98 MMOL/L
CO2 SERPL-SCNC: 38 MMOL/L
CREAT SERPL-MCNC: 1 MG/DL
DELSYS: ABNORMAL
DIFFERENTIAL METHOD: ABNORMAL
EOSINOPHIL # BLD AUTO: 0 K/UL
EOSINOPHIL NFR BLD: 0 %
ERYTHROCYTE [DISTWIDTH] IN BLOOD BY AUTOMATED COUNT: 15.4 %
EST. GFR  (AFRICAN AMERICAN): >60 ML/MIN/1.73 M^2
EST. GFR  (NON AFRICAN AMERICAN): 54 ML/MIN/1.73 M^2
ESTIMATED AVG GLUCOSE: 111 MG/DL
FLOW: 2
GLUCOSE SERPL-MCNC: 135 MG/DL
HBA1C MFR BLD HPLC: 5.5 %
HCO3 UR-SCNC: 42.5 MMOL/L (ref 24–28)
HCT VFR BLD AUTO: 35.4 %
HGB BLD-MCNC: 10.6 G/DL
LYMPHOCYTES # BLD AUTO: 1 K/UL
LYMPHOCYTES NFR BLD: 13 %
MAGNESIUM SERPL-MCNC: 1.8 MG/DL
MCH RBC QN AUTO: 29.1 PG
MCHC RBC AUTO-ENTMCNC: 29.9 G/DL
MCV RBC AUTO: 97 FL
MODE: ABNORMAL
MONOCYTES # BLD AUTO: 0.2 K/UL
MONOCYTES NFR BLD: 2.8 %
NEUTROPHILS # BLD AUTO: 6.4 K/UL
NEUTROPHILS NFR BLD: 84.1 %
PCO2 BLDA: 73.1 MMHG (ref 35–45)
PH SMN: 7.37 [PH] (ref 7.35–7.45)
PHOSPHATE SERPL-MCNC: 3.1 MG/DL
PLATELET # BLD AUTO: 291 K/UL
PMV BLD AUTO: 9.1 FL
PO2 BLDA: 64 MMHG (ref 80–100)
POC BE: 17 MMOL/L
POC SATURATED O2: 90 % (ref 95–100)
POC TCO2: 45 MMOL/L (ref 23–27)
POCT GLUCOSE: 134 MG/DL (ref 70–110)
POCT GLUCOSE: 142 MG/DL (ref 70–110)
POCT GLUCOSE: 147 MG/DL (ref 70–110)
POTASSIUM SERPL-SCNC: 4.6 MMOL/L
RBC # BLD AUTO: 3.64 M/UL
SAMPLE: ABNORMAL
SITE: ABNORMAL
SODIUM SERPL-SCNC: 141 MMOL/L
WBC # BLD AUTO: 7.62 K/UL

## 2017-08-22 PROCEDURE — 83735 ASSAY OF MAGNESIUM: CPT

## 2017-08-22 PROCEDURE — 25000242 PHARM REV CODE 250 ALT 637 W/ HCPCS: Performed by: NURSE PRACTITIONER

## 2017-08-22 PROCEDURE — 36415 COLL VENOUS BLD VENIPUNCTURE: CPT

## 2017-08-22 PROCEDURE — 25000003 PHARM REV CODE 250: Performed by: HOSPITALIST

## 2017-08-22 PROCEDURE — 27000221 HC OXYGEN, UP TO 24 HOURS

## 2017-08-22 PROCEDURE — 84100 ASSAY OF PHOSPHORUS: CPT

## 2017-08-22 PROCEDURE — 94761 N-INVAS EAR/PLS OXIMETRY MLT: CPT

## 2017-08-22 PROCEDURE — 25000003 PHARM REV CODE 250: Performed by: NURSE PRACTITIONER

## 2017-08-22 PROCEDURE — 85025 COMPLETE CBC W/AUTO DIFF WBC: CPT

## 2017-08-22 PROCEDURE — 11000001 HC ACUTE MED/SURG PRIVATE ROOM

## 2017-08-22 PROCEDURE — 63600175 PHARM REV CODE 636 W HCPCS: Performed by: NURSE PRACTITIONER

## 2017-08-22 PROCEDURE — 94660 CPAP INITIATION&MGMT: CPT

## 2017-08-22 PROCEDURE — 36600 WITHDRAWAL OF ARTERIAL BLOOD: CPT

## 2017-08-22 PROCEDURE — 83036 HEMOGLOBIN GLYCOSYLATED A1C: CPT

## 2017-08-22 PROCEDURE — 80048 BASIC METABOLIC PNL TOTAL CA: CPT

## 2017-08-22 PROCEDURE — 63600175 PHARM REV CODE 636 W HCPCS: Performed by: HOSPITALIST

## 2017-08-22 PROCEDURE — 82803 BLOOD GASES ANY COMBINATION: CPT

## 2017-08-22 PROCEDURE — 94640 AIRWAY INHALATION TREATMENT: CPT

## 2017-08-22 PROCEDURE — 99900035 HC TECH TIME PER 15 MIN (STAT)

## 2017-08-22 RX ORDER — PREGABALIN 50 MG/1
50 CAPSULE ORAL 2 TIMES DAILY
Status: DISCONTINUED | OUTPATIENT
Start: 2017-08-22 | End: 2017-08-23 | Stop reason: HOSPADM

## 2017-08-22 RX ORDER — CEPHALEXIN 500 MG/1
500 CAPSULE ORAL EVERY 6 HOURS
Status: DISCONTINUED | OUTPATIENT
Start: 2017-08-22 | End: 2017-08-23 | Stop reason: HOSPADM

## 2017-08-22 RX ADMIN — ENOXAPARIN SODIUM 40 MG: 100 INJECTION SUBCUTANEOUS at 06:08

## 2017-08-22 RX ADMIN — NYSTATIN AND TRIAMCINOLONE ACETONIDE: 100000; 1 OINTMENT TOPICAL at 09:08

## 2017-08-22 RX ADMIN — PENTOXIFYLLINE 400 MG: 400 TABLET, FILM COATED, EXTENDED RELEASE ORAL at 06:08

## 2017-08-22 RX ADMIN — FLUTICASONE FUROATE AND VILANTEROL TRIFENATATE 1 PUFF: 100; 25 POWDER RESPIRATORY (INHALATION) at 08:08

## 2017-08-22 RX ADMIN — CEPHALEXIN 500 MG: 500 CAPSULE ORAL at 01:08

## 2017-08-22 RX ADMIN — NYSTATIN AND TRIAMCINOLONE ACETONIDE: 100000; 1 OINTMENT TOPICAL at 05:08

## 2017-08-22 RX ADMIN — DONEPEZIL HYDROCHLORIDE 5 MG: 5 TABLET, FILM COATED ORAL at 08:08

## 2017-08-22 RX ADMIN — IPRATROPIUM BROMIDE AND ALBUTEROL SULFATE 3 ML: .5; 3 SOLUTION RESPIRATORY (INHALATION) at 03:08

## 2017-08-22 RX ADMIN — PREGABALIN 50 MG: 50 CAPSULE ORAL at 05:08

## 2017-08-22 RX ADMIN — DULOXETINE 60 MG: 30 CAPSULE, DELAYED RELEASE ORAL at 09:08

## 2017-08-22 RX ADMIN — ATORVASTATIN CALCIUM 40 MG: 40 TABLET, FILM COATED ORAL at 09:08

## 2017-08-22 RX ADMIN — CEPHALEXIN 500 MG: 500 CAPSULE ORAL at 11:08

## 2017-08-22 RX ADMIN — PREGABALIN 50 MG: 50 CAPSULE ORAL at 08:08

## 2017-08-22 RX ADMIN — RAMELTEON 8 MG: 8 TABLET, FILM COATED ORAL at 01:08

## 2017-08-22 RX ADMIN — IPRATROPIUM BROMIDE AND ALBUTEROL SULFATE 3 ML: .5; 3 SOLUTION RESPIRATORY (INHALATION) at 11:08

## 2017-08-22 RX ADMIN — CEPHALEXIN 500 MG: 500 CAPSULE ORAL at 06:08

## 2017-08-22 RX ADMIN — IPRATROPIUM BROMIDE AND ALBUTEROL SULFATE 3 ML: .5; 3 SOLUTION RESPIRATORY (INHALATION) at 08:08

## 2017-08-22 RX ADMIN — IPRATROPIUM BROMIDE AND ALBUTEROL SULFATE 3 ML: .5; 3 SOLUTION RESPIRATORY (INHALATION) at 07:08

## 2017-08-22 RX ADMIN — AMLODIPINE BESYLATE 5 MG: 5 TABLET ORAL at 09:08

## 2017-08-22 RX ADMIN — ACETAMINOPHEN 650 MG: 325 TABLET ORAL at 09:08

## 2017-08-22 RX ADMIN — CEFAZOLIN SODIUM 1 G: 1 SOLUTION INTRAVENOUS at 02:08

## 2017-08-22 RX ADMIN — PREDNISONE 40 MG: 20 TABLET ORAL at 09:08

## 2017-08-22 RX ADMIN — THERA TABS 1 TABLET: TAB at 09:08

## 2017-08-22 RX ADMIN — NYSTATIN AND TRIAMCINOLONE ACETONIDE: 100000; 1 OINTMENT TOPICAL at 01:08

## 2017-08-22 NOTE — PROGRESS NOTES
"U Pulmonology/Critical Care Resident HO-2 Progress Note    Subjective:      Adela Jessica is a 77 y.o.  female who is being followed by the U Pulmonary/Critical Care service at [facility] for Acute Hypercapnic Respiratory Failure.     Patient did well overnight, only complaint was mild insomnia. She is on her home 2L O2 by nasal cannula, currently denies SOB. She denies any CP, abdominal pain or leg pain, and no new complaints today. Afebrile.      Objective:   Last 24 Hour Vital Signs:  BP  Min: 104/66  Max: 200/80  Temp  Av.9 °F (36.6 °C)  Min: 97.6 °F (36.4 °C)  Max: 98 °F (36.7 °C)  Pulse  Av.3  Min: 49  Max: 98  Resp  Av.1  Min: 10  Max: 46  SpO2  Av.4 %  Min: 83 %  Max: 100 %  Height  Av' 6" (167.6 cm)  Min: 5' 6" (167.6 cm)  Max: 5' 6" (167.6 cm)  Weight  Av kg (328 lb 7.8 oz)  Min: 148 kg (326 lb 4.5 oz)  Max: 150 kg (330 lb 11 oz)  I/O last 3 completed shifts:  In: 200 [P.O.:50; IV Piggyback:150]  Out: 0     Physical Examination:  General: NAD, resting in bed comfortably.   HEENT: PERRL EOMI. Moist mucus membranes.. Without sclera erythema/jaundice.   CV: RRR, difficult to auscultate 2/2 body habitus. . Distal pulses 2+, symmetric.   RESP: CTABL. difficult to auscultate 2/2 body habitus.   Symmetric. On NC  Abdomen: s/nt/nd.   Extremities: no edema bilat LE, LLE with erythema and warmth to mid shin significantly improved   Neuro: Alert and oriented x 3. Moving all 4 extremities spontaneously   Skin: skin findings on LLE as above, area of erythema under panus and right breast     Laboratory:    Recent Labs  Lab 17  1845 17  0411 17  0331   WBC 10.36  --  7.62   HGB 11.8*  --  10.6*   HCT 39.3  --  35.4*     --  291   MCV 99*  --  97   RDW 15.5*  --  15.4*    144 141   K 4.4 4.2 4.6   CL 97 99 98   CO2 37* 40* 38*   BUN 27* 26* 28*   CREATININE 1.1 1.2 1.0    110 135*   PROT 7.8  --   --    ALBUMIN 2.9*  --   --  "   BILITOT 0.4  --   --    AST 16  --   --    ALKPHOS 114  --   --    ALT 14  --   --        Other Results:    Blood cultures 8/20 prelim NGTD     Radiology Data Reviewed: yes  Pertinent Findings:      Current Medications:     Infusions:        Scheduled:   albuterol-ipratropium 2.5mg-0.5mg/3mL  3 mL Nebulization Q4H    amlodipine  5 mg Oral Daily    atorvastatin  40 mg Oral Daily    ceFAZolin (ANCEF) IVPB  1 g Intravenous Q8H    donepezil  5 mg Oral QHS    duloxetine  60 mg Oral Daily    enoxaparin  40 mg Subcutaneous Daily    fluticasone-vilanterol  1 puff Inhalation Daily    multivitamin  1 tablet Oral Daily    nystatin-triamcinolone   Topical TID    pentoxifylline  400 mg Oral BID WM    predniSONE  40 mg Oral Daily    pregabalin  50 mg Oral TID        PRN:  acetaminophen, dextrose 50%, dextrose 50%, glucagon (human recombinant), glucose, glucose, hydrALAZINE, insulin aspart, metoprolol, ondansetron, ondansetron, polyethylene glycol, ramelteon    Antibiotics and Day Number of Therapy:  Cefazolin 8/21-    Lines and Day Number of Therapy:  PIV    Assessment:     Adela Jessica is a 77 y.o.female with  Patient Active Problem List    Diagnosis Date Noted    Acute respiratory acidosis 08/21/2017    Yeast infection of the skin 08/21/2017    Supplemental oxygen dependent 08/21/2017    Acute on chronic respiratory failure with hypercapnia 08/21/2017    Cellulitis of left lower extremity 08/20/2017    HIWOT and COPD overlap syndrome 08/20/2017    Hypomagnesemia 01/28/2017    Physical deconditioning 01/28/2017    Pulmonary edema with congestive heart failure 01/27/2017    Streptococcal pneumonia 01/27/2017    Centrilobular emphysema 01/26/2017    SUBHASH (acute kidney injury) 01/25/2017    Morbid obesity due to excess calories 01/25/2017    Encephalopathy acute 01/25/2017    Uremia 01/25/2017    Tinea pedis of both feet 01/25/2017    Normocytic anemia 01/25/2017    Severe sepsis with acute organ  dysfunction 01/25/2017    Alzheimer's dementia without behavioral disturbance 01/25/2017    Hyponatremia 01/25/2017    Shock 01/25/2017    Hyperkalemia 11/11/2013    CKD (chronic kidney disease) stage 3, GFR 30-59 ml/min 11/11/2013    Leukocytosis 11/11/2013    Type 2 diabetes mellitus treated without insulin 11/11/2013    COPD (chronic obstructive pulmonary disease) 11/11/2013    Essential hypertension 11/11/2013    Mixed hyperlipidemia 11/11/2013    Troponin I above reference range 11/11/2013    Acute on chronic respiratory failure with hypoxia and hypercapnia 11/10/2013        Plan:     Neuro:  -History of dementia- on donepezil 5mg qHS  -patient appears to be at her baseline MS with improved CO2, A&Ox3    CV  -Last ECHO 1/2017 with mildly reduced EF 50%, /60 in ED  -Was hypertensive, BP stable with management per primary team      Resp:  Acute on Chronic Hypercapnic Respiratory Failure  -Patient on 2L home O2, with past 3 days of confusion and generalized weakness, had Home O2 increased to 4L for hypoxia by EMS 2 days ago, with subsequent worsening confusion  -BNP 83, no hx HF, CXR with mildly increased pulm vasculature, appears consistent with previous CXR's, no focal opacities   -Likely Patient with chronic hypercapnia from OHS +/- HIWOT and needs CPAP at home, now with acute issue possibly 2/2 infection vs increase in Home O2 to 4L after NC found to not be working properly vs medication side effect?- now improved and back on home O2  -Do not feel patient needs steroids at this time  -Repeat ABG yesterday improved: [phone]%, Now on Nasal cannula 2L satting 89% and no symptoms. OK with stepping down to the floor today. Will need CPAP for home prior to discharge as patient most likely has OHS/HIWOT causing her chronic hypercapnia and acute respiratory failure     -Would also recommend Inpatient bedside spirometry to assess for COPD as patient carries this diagnosis in the chart, but  unable to find any documentation of prior testing to confirm diagnosis. If No COPD, would discontinue COPD medications as would have unclear benefit.        Renal:  -Patient with known CKD 4, Cr 1.0 currently, continue to avoid nephrotoxic agents       Heme/ID:   -WBC normal 10.36, afebrile, however with LLE erythema with possible cellulitis?   -Procal negative- Would discontinue antibiotics, patient afebrile, leg is improved, does not appear septic at this time   -Would continue topical treatment for likely candidal skin infection   -History of anemia, FOBT pos, H/H is near patient's baseline, stable, monitor     Endocrine:   - HbA1c 5.5, continue SSI and monitor blood glucose     Recommend Nutrition consult, CPAP for home with possible repeat sleep study outpatient if needed.      PPx: SCD, holding AC for concern possible occult GIB?        We will sign off. Thank you for allowing us to participate in the care of this patient. Please call with any questions regarding this consult.     Radha Montana  U Internal Medicine HO-2  U Pulmonology/Critical Care Service Team     Pt seen and examined with Pulmonary/Critical Care team and this note reviewed and validated with the following additional comments:    Alert, animated.  Admits non-compliance with weight loss & CPAP.  Education given.  OK to leave ICU & D/C abx.    Reggie Fox MD  Phone [phone]

## 2017-08-22 NOTE — PLAN OF CARE
Problem: Patient Care Overview  Goal: Plan of Care Review  Outcome: Ongoing (interventions implemented as appropriate)   Patient understands, and agrees with plan of care. ABCDEF Bundle. Not intubated nor sedated. Breathing on 2 liters nasal canula, respirations stable currently 25, lungs clear pulse ox 95. Cam score negative, patient alert and oriented times 4 and can follow commands. Patient can move independently in bed, and move all extremities. No new signs of skin breakdown. Patient remain free from fall in injury throughout the shift. Family at bedside and involved with patient care. Patient denies chest pain throughout the shift. Patient ate 50% of meals and voided approprietly each hour. No BM. Side rails up times 2, call light in reach, will continue to monitor.

## 2017-08-22 NOTE — PLAN OF CARE
Patient is Home o2 dependent, inquiring to cost of portable concentrator, TN asked Lisa Garza with DME and she stated that for her dose of 2L continuous is 3590.00 and weighs 23 lbs, the pulse dose is 9lbs and costs 3500.00.  Patient states she has history of home health but can't recall who with, TN confirmed that patient was set up with [facility] in 2015 but refused care.  Patient lives with sister who does not drive, has a nephew Jeremi Moya who does drive her to appts as needed. Reliant on DME. TN explained PCC to patient and she states she needs to check with her family to see if they can bring her all way from [place] for the appt.     08/22/17 9836   Discharge Assessment   Assessment Type Discharge Planning Assessment   Confirmed/corrected address and phone number on facesheet? Yes   Assessment information obtained from? Patient   Expected Length of Stay (days) 3   Communicated expected length of stay with patient/caregiver yes   Prior to hospitilization cognitive status: Alert/Oriented   Prior to hospitalization functional status: Assistive Equipment;Needs Assistance   Current cognitive status: Alert/Oriented   Current Functional Status: Assistive Equipment;Needs Assistance   Facility Arrived From: home or   Able to Return to Prior Arrangements unable to determine at this time (comments)   Is patient able to care for self after discharge? Unable to determine at this time (comments)   Who are your caregiver(s) and their phone number(s)? Alli Kitchen Relative     [phone]    Patient's perception of discharge disposition home health   Readmission Within The Last 30 Days no previous admission in last 30 days   Patient currently being followed by outpatient case management? No   Patient currently receives any other outside agency services? No   Equipment Currently Used at Home bedside commode;walker, rolling;shower chair   Do you have any problems affording any of your prescribed medications? No   Is  the patient taking medications as prescribed? yes   Does the patient have transportation home? Yes   Transportation Available family or friend will provide   Does the patient receive services at the [facility]? No   Discharge Plan A Home with family;Home Health   Discharge Plan B Home with family   Patient/Family In Agreement With Plan yes

## 2017-08-22 NOTE — PLAN OF CARE
Problem: Patient Care Overview  Goal: Plan of Care Review  Outcome: Ongoing (interventions implemented as appropriate)  Pt on BIPAP with documented settings. Alarms are set and working. Will continue to monitor.

## 2017-08-22 NOTE — PLAN OF CARE
Problem: Patient Care Overview  Goal: Plan of Care Review  Outcome: Ongoing (interventions implemented as appropriate)  Pt /86 at beginning of shift. MD notified. Lopressor given per orders. BP decreased to 110/60, HR 50-60's, sats 97% on 40% BiPAP. Denies SOB or CP. Pt states she could not fall asleep. Given Ramelteon per MAR. Skin care BID. One incontinent episode throughout the night. Pt cleaned, full bath given, and linen changed. Skin Safety precautions in place.

## 2017-08-22 NOTE — PLAN OF CARE
"Pt reports difficulty falling asleep. States "I normally take something to help me sleep", but uncertain of name. Med rec checked. MD notified. New orders noted.   "

## 2017-08-22 NOTE — ASSESSMENT & PLAN NOTE
Centrilobular emphysema  HIWOT and COPD overlap syndrome  Acute respiratory acidosis  Acute on chronic respiratory failure with hypercapnia  Wean off BiPAP as tolerated and continue BiPAP nightly.  Continue albuterol-ipratropium nebulizer treatments and steroids.  Appreciate Pulmonology.

## 2017-08-22 NOTE — SUBJECTIVE & OBJECTIVE
Interval History: No acute events.    Review of Systems   Constitutional: Negative for chills and fever.   Gastrointestinal: Negative for nausea and vomiting.     Objective:     Vital Signs (Most Recent):  Temp: 97.5 °F (36.4 °C) (08/22/17 0715)  Pulse: 73 (08/22/17 0819)  Resp: (!) 28 (08/22/17 0819)  BP: 139/60 (08/22/17 0700)  SpO2: 95 % (08/22/17 0819) Vital Signs (24h Range):  Temp:  [97.5 °F (36.4 °C)-98 °F (36.7 °C)] 97.5 °F (36.4 °C)  Pulse:  [49-89] 73  Resp:  [10-46] 28  SpO2:  [83 %-99 %] 95 %  BP: (104-200)/(52-96) 139/60     Weight: (!) 148 kg (326 lb 4.5 oz)  Body mass index is 52.66 kg/m².    Intake/Output Summary (Last 24 hours) at 08/22/17 0906  Last data filed at 08/22/17 0509   Gross per 24 hour   Intake              200 ml   Output                0 ml   Net              200 ml      Physical Exam   Constitutional: She appears well-developed and well-nourished. No distress. Face mask in place.   HENT:   Head: Normocephalic and atraumatic.   Cardiovascular: Normal rate and regular rhythm.    No murmur heard.  Pulmonary/Chest: Tachypnea noted. No respiratory distress. She has decreased breath sounds. She has no wheezes.   Abdominal: Soft. Bowel sounds are normal. She exhibits no distension. There is no tenderness.   Musculoskeletal: She exhibits edema.   Neurological: She is alert.   Skin: Skin is warm and dry. There is erythema.   Nursing note and vitals reviewed.      Significant Labs:   ABGs:     Recent Labs  Lab 08/21/17  1218   PH 7.292*   PCO2 84.5*   HCO3 40.8*   POCSATURATED 89*   BE 14     CBC:     Recent Labs  Lab 08/20/17 1845 08/22/17  0331   WBC 10.36 7.62   HGB 11.8* 10.6*   HCT 39.3 35.4*    291     CMP:     Recent Labs  Lab 08/20/17  1845 08/21/17  0411 08/22/17  0331    144 141   K 4.4 4.2 4.6   CL 97 99 98   CO2 37* 40* 38*    110 135*   BUN 27* 26* 28*   CREATININE 1.1 1.2 1.0   CALCIUM 9.1 9.1 9.0   PROT 7.8  --   --    ALBUMIN 2.9*  --   --    BILITOT 0.4  --    --    ALKPHOS 114  --   --    AST 16  --   --    ALT 14  --   --    ANIONGAP 8 5* 5*   EGFRNONAA 48* 43* 54*     Magnesium:     Recent Labs  Lab 08/20/17  2217 08/22/17  0331   MG 2.1 1.8     POCT Glucose:     Recent Labs  Lab 08/20/17  1822 08/21/17  0604 08/21/17  0953   POCTGLUCOSE 188* 131* 140*       Significant Imaging: I have reviewed all pertinent imaging results/findings within the past 24 hours.   X-Ray Chest 1 View 8/20/17: Cardiac silhouette is upper limits of normal but stable size.  Lungs are symmetrically expanded.  Prominence of central pulmonary vasculature with increased bilateral interstitial markings and perihilar opacity suggestive for mild pulmonary edema.  No evidence of focal consolidative process, pneumothorax, or significant effusion.  Bones show DJD.  No free air visualized beneath the diaphragm.  US Lower Extremity Veins Bilateral 8/20/17:   RIGHT LEG:  The imaged common femoral, superficial femoral, popliteal, peroneal and anterior and posterior tibial veins show no signs of deep vein thrombosis.  The imaged common femoral, superficial femoral and popliteal veins were examined using spectral analysis and show normal wave forms with normal response to augmentation and respiration.  LEFT LEG:  The imaged common femoral, superficial femoral, popliteal, peroneal and anterior and posterior tibial veins show no signs of deep vein thrombosis.  The imaged common femoral, superficial femoral and popliteal veins were examined using spectral analysis and show normal wave forms with normal response to augmentation and respiration. Nonspecific mild subcutaneous edema at the left calf region.  Impression: No evidence of deep venous thrombosis in either lower extremity.  Left calf nonspecific mild subcutaneous edema.  CT Head Without Contrast 8/20/17: Patient is rotated and tilted within the scanner. The brain appears normally formed without evidence of hemorrhage, mass, midline shift or acute major  vascular territory infarct.  Gray-white interface appears intact.  The ventricular system is normal in size for age and demonstrates no distortion by mass effect.  No extra-axial hemorrhage or mass.  There is a 1.5 cm or circumscribed, homogeneous, round mass with peripheral calcification located within the subcutaneous soft tissues of the right frontal scalp near the vertex, while nonspecific suggests a complex sebaceous cyst. Hyperostosis frontalis interna. No displaced calvarial fracture.  Imaged portions of the paranasal sinuses and mastoid air cells are clear. Probable prior surgery to the bilateral ocular lenses.  X-Ray Tibia Fibula 2 View Left 8/20/17: No evidence of fracture, dislocation, or osseous destructive process.  Degenerative changes are visualized at the ankle.  There is significant soft tissue swelling at the ankle.  Large posterior calcaneal spur visualized.  Mild degenerative changes seen of the knee.  No suprapatellar joint effusion.

## 2017-08-22 NOTE — ASSESSMENT & PLAN NOTE
Hemoglobin A1c 5.5%.  Takes glipizide 5 mg daily.  Holding.  Continue sliding scale insulin aspart.

## 2017-08-22 NOTE — PROGRESS NOTES
[facility]  Progress Note    Patient Name: Adela Jessica  MRN: [id number]  Patient Class: IP- Inpatient   Admission Date: 8/20/2017  Length of Stay: 2 days  Attending Physician: Drake Coyne MD  Primary Care Provider: Cindy Spencer NP        Subjective:     Principal Problem:Acute on chronic respiratory failure with hypoxia and hypercapnia    HPI:  Adela Jessica is a 77 y.o. white woman with morbid obesity, obstructive sleep apnea, chronic obstructive pulmonary disease, chronic hypoxia and hypercapnia (on supplemental oxygen), chronic kidney disease stage 3, Alzheimer's dementia, hypertension, hyperlipidemia, and diabetes mellitus type 2.  She lives with her sister in [place], [place].  Her primary care provider is nurse practitioner Cindy Spencer.   She presented to [facility] on 8/20/17 with weakness, somnolence, and delirium.  Her nephew reported that EMS checked her 2 days prior and noted hypoxia, but she refused to go to the hospital at that time.  EMS gave her a new nasal cannula because hers was not operating correctly.  She worsened before being taken to the ED, where ABG showed pH 7.288, pCO2 83.4, pO2 84.  She was put on CPAP.  She also had left leg cellulitis that had been present for the past week, likely caused by a scratch from her dog.  She was given piperacillin-tazobactam.  She also had Candida intertrigo, which she was treating with zinc oxide.  She was admitted to [facility].            Hospital Course:  She was continued on BiPAP for the next couple of days.  IV cefazolin was given for her cellulitis.  Nystatin-triamcinolone was given for her Candida intertrigo.      Interval History: No acute events.    Review of Systems   Constitutional: Negative for chills and fever.   Gastrointestinal: Negative for nausea and vomiting.     Objective:     Vital Signs (Most Recent):  Temp: 97.5 °F (36.4 °C) (08/22/17 0715)  Pulse:  73 (08/22/17 0819)  Resp: (!) 28 (08/22/17 0819)  BP: 139/60 (08/22/17 0700)  SpO2: 95 % (08/22/17 0819) Vital Signs (24h Range):  Temp:  [97.5 °F (36.4 °C)-98 °F (36.7 °C)] 97.5 °F (36.4 °C)  Pulse:  [49-89] 73  Resp:  [10-46] 28  SpO2:  [83 %-99 %] 95 %  BP: (104-200)/(52-96) 139/60     Weight: (!) 148 kg (326 lb 4.5 oz)  Body mass index is 52.66 kg/m².    Intake/Output Summary (Last 24 hours) at 08/22/17 0906  Last data filed at 08/22/17 0509   Gross per 24 hour   Intake              200 ml   Output                0 ml   Net              200 ml      Physical Exam   Constitutional: She appears well-developed and well-nourished. No distress. Face mask in place.   HENT:   Head: Normocephalic and atraumatic.   Cardiovascular: Normal rate and regular rhythm.    No murmur heard.  Pulmonary/Chest: Tachypnea noted. No respiratory distress. She has decreased breath sounds. She has no wheezes.   Abdominal: Soft. Bowel sounds are normal. She exhibits no distension. There is no tenderness.   Musculoskeletal: She exhibits edema.   Neurological: She is alert.   Skin: Skin is warm and dry. There is erythema.   Nursing note and vitals reviewed.      Significant Labs:   ABGs:     Recent Labs  Lab 08/21/17  1218   PH 7.292*   PCO2 84.5*   HCO3 40.8*   POCSATURATED 89*   BE 14     CBC:     Recent Labs  Lab 08/20/17 1845 08/22/17  0331   WBC 10.36 7.62   HGB 11.8* 10.6*   HCT 39.3 35.4*    291     CMP:     Recent Labs  Lab 08/20/17  1845 08/21/17  0411 08/22/17  0331    144 141   K 4.4 4.2 4.6   CL 97 99 98   CO2 37* 40* 38*    110 135*   BUN 27* 26* 28*   CREATININE 1.1 1.2 1.0   CALCIUM 9.1 9.1 9.0   PROT 7.8  --   --    ALBUMIN 2.9*  --   --    BILITOT 0.4  --   --    ALKPHOS 114  --   --    AST 16  --   --    ALT 14  --   --    ANIONGAP 8 5* 5*   EGFRNONAA 48* 43* 54*     Magnesium:     Recent Labs  Lab 08/20/17  2217 08/22/17  0331   MG 2.1 1.8     POCT Glucose:     Recent Labs  Lab 08/20/17  9471  08/21/17  0604 08/21/17  0953   POCTGLUCOSE 188* 131* 140*       Significant Imaging: I have reviewed all pertinent imaging results/findings within the past 24 hours.   X-Ray Chest 1 View 8/20/17: Cardiac silhouette is upper limits of normal but stable size.  Lungs are symmetrically expanded.  Prominence of central pulmonary vasculature with increased bilateral interstitial markings and perihilar opacity suggestive for mild pulmonary edema.  No evidence of focal consolidative process, pneumothorax, or significant effusion.  Bones show DJD.  No free air visualized beneath the diaphragm.  US Lower Extremity Veins Bilateral 8/20/17:   RIGHT LEG:  The imaged common femoral, superficial femoral, popliteal, peroneal and anterior and posterior tibial veins show no signs of deep vein thrombosis.  The imaged common femoral, superficial femoral and popliteal veins were examined using spectral analysis and show normal wave forms with normal response to augmentation and respiration.  LEFT LEG:  The imaged common femoral, superficial femoral, popliteal, peroneal and anterior and posterior tibial veins show no signs of deep vein thrombosis.  The imaged common femoral, superficial femoral and popliteal veins were examined using spectral analysis and show normal wave forms with normal response to augmentation and respiration. Nonspecific mild subcutaneous edema at the left calf region.  Impression: No evidence of deep venous thrombosis in either lower extremity.  Left calf nonspecific mild subcutaneous edema.  CT Head Without Contrast 8/20/17: Patient is rotated and tilted within the scanner. The brain appears normally formed without evidence of hemorrhage, mass, midline shift or acute major vascular territory infarct.  Gray-white interface appears intact.  The ventricular system is normal in size for age and demonstrates no distortion by mass effect.  No extra-axial hemorrhage or mass.  There is a 1.5 cm or circumscribed,  homogeneous, round mass with peripheral calcification located within the subcutaneous soft tissues of the right frontal scalp near the vertex, while nonspecific suggests a complex sebaceous cyst. Hyperostosis frontalis interna. No displaced calvarial fracture.  Imaged portions of the paranasal sinuses and mastoid air cells are clear. Probable prior surgery to the bilateral ocular lenses.  X-Ray Tibia Fibula 2 View Left 8/20/17: No evidence of fracture, dislocation, or osseous destructive process.  Degenerative changes are visualized at the ankle.  There is significant soft tissue swelling at the ankle.  Large posterior calcaneal spur visualized.  Mild degenerative changes seen of the knee.  No suprapatellar joint effusion.    Assessment/Plan:      * Acute on chronic respiratory failure with hypoxia and hypercapnia    Centrilobular emphysema  HIWOT and COPD overlap syndrome  Acute respiratory acidosis  Acute on chronic respiratory failure with hypercapnia  Wean off BiPAP as tolerated and continue BiPAP nightly.  Continue albuterol-ipratropium nebulizer treatments and steroids.  Appreciate Pulmonology.        Yeast infection of the skin    Cleans BID and apply nystatin-triamcinolone ointment TID        Cellulitis of left lower extremity    On cefazolin.  Switch to oral antibiotic when off continuous BiPAP.          Alzheimer's dementia without behavioral disturbance    Continue donepezil 5 mg HS.          Normocytic anemia    At Baseline. Hemoccult + Brown stool per rectal exam. Monitor hemoglobin. Does not appear to be acute GI bleeding.         Morbid obesity due to excess calories    BMI 56.7.          Mixed hyperlipidemia    Continue home atorvastatin 40 mg daily          Essential hypertension    Continue home amlodipine 5 mg daily.  Hydralazine prn.           Type 2 diabetes mellitus treated without insulin    Hemoglobin A1c 5.5%.  Takes glipizide 5 mg daily.  Holding.  Continue sliding scale insulin aspart.         CKD (chronic kidney disease) stage 3, GFR 30-59 ml/min    Stable.            VTE Risk Mitigation         Ordered     enoxaparin injection 40 mg  Daily     Route:  Subcutaneous        08/21/17 0738     High Risk of VTE  Once      08/21/17 0738        Disposition: Home when respiratory status back to baseline.    Critical care time spent on the evaluation and treatment of severe organ dysfunction, review of pertinent labs and imaging studies, discussions with consulting providers and discussions with patient/family: 30 minutes.    Drake Coyne MD  Department of Hospital Medicine   [facility]

## 2017-08-22 NOTE — HOSPITAL COURSE
She was continued on BiPAP for the next couple of days.  IV cefazolin was given for her cellulitis.  Nystatin-triamcinolone was given for her Candida intertrigo.  She was weaned back to her usual 2 liters nasal cannula by 8/23/17.  She was discharged home with prescriptions for cephalexin, nystatin-triamcinolone, and a BiPAP.  Pregabalin was decreased from three times daily to twice daily because it is a sedating medication.

## 2017-08-23 VITALS
DIASTOLIC BLOOD PRESSURE: 58 MMHG | WEIGHT: 293 LBS | HEIGHT: 66 IN | TEMPERATURE: 98 F | RESPIRATION RATE: 22 BRPM | SYSTOLIC BLOOD PRESSURE: 124 MMHG | HEART RATE: 79 BPM | OXYGEN SATURATION: 91 % | BODY MASS INDEX: 47.09 KG/M2

## 2017-08-23 PROBLEM — J96.11 CHRONIC RESPIRATORY FAILURE WITH HYPOXIA AND HYPERCAPNIA: Chronic | Status: ACTIVE | Noted: 2017-08-21

## 2017-08-23 PROBLEM — J96.02 ACUTE RESPIRATORY ACIDOSIS: Status: RESOLVED | Noted: 2017-08-21 | Resolved: 2017-08-23

## 2017-08-23 PROBLEM — J96.12 CHRONIC RESPIRATORY FAILURE WITH HYPOXIA AND HYPERCAPNIA: Chronic | Status: ACTIVE | Noted: 2017-08-21

## 2017-08-23 LAB
ANION GAP SERPL CALC-SCNC: 6 MMOL/L
BASOPHILS # BLD AUTO: 0 K/UL
BASOPHILS NFR BLD: 0 %
BUN SERPL-MCNC: 34 MG/DL
CALCIUM SERPL-MCNC: 9.1 MG/DL
CHLORIDE SERPL-SCNC: 98 MMOL/L
CO2 SERPL-SCNC: 37 MMOL/L
CREAT SERPL-MCNC: 1.1 MG/DL
DIFFERENTIAL METHOD: ABNORMAL
EOSINOPHIL # BLD AUTO: 0 K/UL
EOSINOPHIL NFR BLD: 0.2 %
ERYTHROCYTE [DISTWIDTH] IN BLOOD BY AUTOMATED COUNT: 15.8 %
EST. GFR  (AFRICAN AMERICAN): 56 ML/MIN/1.73 M^2
EST. GFR  (NON AFRICAN AMERICAN): 48 ML/MIN/1.73 M^2
GLUCOSE SERPL-MCNC: 109 MG/DL
HCT VFR BLD AUTO: 34.2 %
HGB BLD-MCNC: 10.3 G/DL
LYMPHOCYTES # BLD AUTO: 2.3 K/UL
LYMPHOCYTES NFR BLD: 23 %
MCH RBC QN AUTO: 28.9 PG
MCHC RBC AUTO-ENTMCNC: 30.1 G/DL
MCV RBC AUTO: 96 FL
MONOCYTES # BLD AUTO: 0.7 K/UL
MONOCYTES NFR BLD: 6.6 %
NEUTROPHILS # BLD AUTO: 6.9 K/UL
NEUTROPHILS NFR BLD: 69.9 %
PLATELET # BLD AUTO: 310 K/UL
PMV BLD AUTO: 9.1 FL
POCT GLUCOSE: 114 MG/DL (ref 70–110)
POTASSIUM SERPL-SCNC: 4 MMOL/L
RBC # BLD AUTO: 3.57 M/UL
SODIUM SERPL-SCNC: 141 MMOL/L
WBC # BLD AUTO: 9.92 K/UL

## 2017-08-23 PROCEDURE — 94640 AIRWAY INHALATION TREATMENT: CPT

## 2017-08-23 PROCEDURE — 36415 COLL VENOUS BLD VENIPUNCTURE: CPT

## 2017-08-23 PROCEDURE — 63600175 PHARM REV CODE 636 W HCPCS: Performed by: HOSPITALIST

## 2017-08-23 PROCEDURE — 85025 COMPLETE CBC W/AUTO DIFF WBC: CPT

## 2017-08-23 PROCEDURE — 94761 N-INVAS EAR/PLS OXIMETRY MLT: CPT

## 2017-08-23 PROCEDURE — 25000242 PHARM REV CODE 250 ALT 637 W/ HCPCS: Performed by: NURSE PRACTITIONER

## 2017-08-23 PROCEDURE — 27000221 HC OXYGEN, UP TO 24 HOURS

## 2017-08-23 PROCEDURE — 25000003 PHARM REV CODE 250: Performed by: HOSPITALIST

## 2017-08-23 PROCEDURE — 25000003 PHARM REV CODE 250: Performed by: NURSE PRACTITIONER

## 2017-08-23 PROCEDURE — 80048 BASIC METABOLIC PNL TOTAL CA: CPT

## 2017-08-23 RX ORDER — CEPHALEXIN 500 MG/1
500 CAPSULE ORAL 4 TIMES DAILY
Qty: 16 CAPSULE | Refills: 0 | Status: SHIPPED | OUTPATIENT
Start: 2017-08-23 | End: 2017-08-27

## 2017-08-23 RX ORDER — PREGABALIN 50 MG/1
50 CAPSULE ORAL 2 TIMES DAILY
Start: 2017-08-23

## 2017-08-23 RX ORDER — NYSTATIN AND TRIAMCINOLONE ACETONIDE 100000; 1 [USP'U]/G; MG/G
OINTMENT TOPICAL 2 TIMES DAILY
Qty: 60 G | Refills: 0 | Status: SHIPPED | OUTPATIENT
Start: 2017-08-23

## 2017-08-23 RX ADMIN — PENTOXIFYLLINE 400 MG: 400 TABLET, FILM COATED, EXTENDED RELEASE ORAL at 09:08

## 2017-08-23 RX ADMIN — CEPHALEXIN 500 MG: 500 CAPSULE ORAL at 12:08

## 2017-08-23 RX ADMIN — THERA TABS 1 TABLET: TAB at 08:08

## 2017-08-23 RX ADMIN — DULOXETINE 60 MG: 30 CAPSULE, DELAYED RELEASE ORAL at 08:08

## 2017-08-23 RX ADMIN — PREDNISONE 40 MG: 20 TABLET ORAL at 08:08

## 2017-08-23 RX ADMIN — ATORVASTATIN CALCIUM 40 MG: 40 TABLET, FILM COATED ORAL at 08:08

## 2017-08-23 RX ADMIN — IPRATROPIUM BROMIDE AND ALBUTEROL SULFATE 3 ML: .5; 3 SOLUTION RESPIRATORY (INHALATION) at 12:08

## 2017-08-23 RX ADMIN — IPRATROPIUM BROMIDE AND ALBUTEROL SULFATE 3 ML: .5; 3 SOLUTION RESPIRATORY (INHALATION) at 08:08

## 2017-08-23 RX ADMIN — PREGABALIN 50 MG: 50 CAPSULE ORAL at 08:08

## 2017-08-23 RX ADMIN — IPRATROPIUM BROMIDE AND ALBUTEROL SULFATE 3 ML: .5; 3 SOLUTION RESPIRATORY (INHALATION) at 04:08

## 2017-08-23 RX ADMIN — CEPHALEXIN 500 MG: 500 CAPSULE ORAL at 05:08

## 2017-08-23 RX ADMIN — FLUTICASONE FUROATE AND VILANTEROL TRIFENATATE 1 PUFF: 100; 25 POWDER RESPIRATORY (INHALATION) at 08:08

## 2017-08-23 RX ADMIN — AMLODIPINE BESYLATE 5 MG: 5 TABLET ORAL at 08:08

## 2017-08-23 RX ADMIN — NYSTATIN AND TRIAMCINOLONE ACETONIDE: 100000; 1 OINTMENT TOPICAL at 05:08

## 2017-08-23 NOTE — PLAN OF CARE
Problem: Patient Care Overview  Goal: Plan of Care Review  Outcome: Revised  Pt VSS and NAD noted overnight. Pt sat 93-95% per 2L NC, RR even and unlabored, mild ROBERT noted on exertion when ambulating to the bathroom. Bi-pap worn tonight. Pt headache pain controlled with tylenol. Received IV antibiotics. Pt ambulated to restroom with standy-by/ minimal assistance using her home walker at the beside. Family at the bedside. All safety precautions maintained.   Tele Recap:  NSR, 60-70's.   Rare PVC's noted.     Rn to continue to monitor.

## 2017-08-23 NOTE — DISCHARGE SUMMARY
[facility]  Discharge Summary      Patient Name: Adela Jessica  MRN: [id number]  Admission Date: 8/20/2017  Hospital Length of Stay: 3 days  Discharge Date and Time: 8/23/2017  1:41 PM  Attending Physician: Drake Coyne MD  Discharging Provider: Drake Coyne MD  Primary Care Provider: Cindy Spencer NP      HPI:   Adela Jessica is a 77 y.o. white woman with morbid obesity, obstructive sleep apnea, chronic obstructive pulmonary disease, chronic hypoxia and hypercapnia (on supplemental oxygen), chronic kidney disease stage 3, Alzheimer's dementia, hypertension, hyperlipidemia, and diabetes mellitus type 2.  She lives with her sister in [place], [place].  Her primary care provider is nurse practitioner Cindy Spencer.     She presented to [facility] on 8/20/17 with weakness, somnolence, and delirium.  Her home CPAP machine was broken.  Her nephew reported that EMS checked her 2 days prior and noted hypoxia, but she refused to go to the hospital at that time.  EMS gave her a new nasal cannula because hers was not operating correctly.  She worsened before being taken to the ED, where ABG showed pH 7.288, pCO2 83.4, pO2 84.  She was put on CPAP.  She also had left leg cellulitis that had been present for the past week, likely caused by a scratch from her dog.  She was given piperacillin-tazobactam.  She also had Candida intertrigo, which she was treating with zinc oxide.  She was admitted to [facility].       Indwelling Lines/Drains at time of discharge:   Lines/Drains/Airways     Central Venous Catheter Line                 Trialysis (Dialysis) Catheter 01/25/17 0400 right internal jugular 210 days              Hospital Course:   She was continued on BiPAP for the next couple of days.  IV cefazolin was given for her cellulitis.  Nystatin-triamcinolone was given for her Candida intertrigo.  She was weaned back to her usual 2 liters nasal  cannula by 8/23/17.  She was discharged home with prescriptions for cephalexin, nystatin-triamcinolone, and a BiPAP.  Pregabalin was decreased from three times daily to twice daily because it is a sedating medication.     Consults:   Consults         Status Ordering Provider     Inpatient consult to Pulmonology  Once     Provider:  Cheryl Maxwell MD    Completed DEEPTI PATRICK          Significant Diagnostic Studies:  X-Ray Chest 1 View 8/20/17: Cardiac silhouette is upper limits of normal but stable size.  Lungs are symmetrically expanded.  Prominence of central pulmonary vasculature with increased bilateral interstitial markings and perihilar opacity suggestive for mild pulmonary edema.  No evidence of focal consolidative process, pneumothorax, or significant effusion.  Bones show DJD.  No free air visualized beneath the diaphragm.  US Lower Extremity Veins Bilateral 8/20/17:   RIGHT LEG:  The imaged common femoral, superficial femoral, popliteal, peroneal and anterior and posterior tibial veins show no signs of deep vein thrombosis.  The imaged common femoral, superficial femoral and popliteal veins were examined using spectral analysis and show normal wave forms with normal response to augmentation and respiration.  LEFT LEG:  The imaged common femoral, superficial femoral, popliteal, peroneal and anterior and posterior tibial veins show no signs of deep vein thrombosis.  The imaged common femoral, superficial femoral and popliteal veins were examined using spectral analysis and show normal wave forms with normal response to augmentation and respiration. Nonspecific mild subcutaneous edema at the left calf region.  Impression: No evidence of deep venous thrombosis in either lower extremity.  Left calf nonspecific mild subcutaneous edema.  CT Head Without Contrast 8/20/17: Patient is rotated and tilted within the scanner. The brain appears normally formed without evidence of hemorrhage, mass, midline shift  or acute major vascular territory infarct.  Gray-white interface appears intact.  The ventricular system is normal in size for age and demonstrates no distortion by mass effect.  No extra-axial hemorrhage or mass.  There is a 1.5 cm or circumscribed, homogeneous, round mass with peripheral calcification located within the subcutaneous soft tissues of the right frontal scalp near the vertex, while nonspecific suggests a complex sebaceous cyst. Hyperostosis frontalis interna. No displaced calvarial fracture.  Imaged portions of the paranasal sinuses and mastoid air cells are clear. Probable prior surgery to the bilateral ocular lenses.  X-Ray Tibia Fibula 2 View Left 8/20/17: No evidence of fracture, dislocation, or osseous destructive process.  Degenerative changes are visualized at the ankle.  There is significant soft tissue swelling at the ankle.  Large posterior calcaneal spur visualized.  Mild degenerative changes seen of the knee.  No suprapatellar joint effusion.    Final Active Diagnoses:    Diagnosis Date Noted POA    Yeast infection of the skin [B37.2] 08/21/2017 Yes    Supplemental oxygen dependent [Z99.81] 08/21/2017 Not Applicable     Chronic    Chronic respiratory failure with hypoxia and hypercapnia [J96.11, J96.12] 08/21/2017 Yes     Chronic    Cellulitis of left lower extremity [L03.116] 08/20/2017 Yes    HIWOT and COPD overlap syndrome [G47.33, J44.9] 08/20/2017 Yes     Chronic    Centrilobular emphysema [J43.2] 01/26/2017 Yes     Chronic    Morbid obesity due to excess calories [E66.01] 01/25/2017 Yes    Normocytic anemia [D64.9] 01/25/2017 Yes    Alzheimer's dementia without behavioral disturbance [G30.9, F02.80] 01/25/2017 Yes     Chronic    CKD (chronic kidney disease) stage 3, GFR 30-59 ml/min [N18.3] 11/11/2013 Yes     Chronic    Essential hypertension [I10] 11/11/2013 Yes     Chronic    Mixed hyperlipidemia [E78.2] 11/11/2013 Yes    Type 2 diabetes mellitus treated without insulin  [E11.9] 11/11/2013 Yes     Chronic      Problems Resolved During this Admission:    Diagnosis Date Noted Date Resolved POA    PRINCIPAL PROBLEM:  Acute on chronic respiratory failure with hypoxia and hypercapnia [J96.21, J96.22] 11/10/2013 08/23/2017 Yes    Acute respiratory acidosis [E87.2] 08/21/2017 08/23/2017 Yes    Encephalopathy acute [G93.40] 01/25/2017 08/22/2017 Yes        Discharged Condition: good    Disposition: Home-Health Care [facility] [place] [place]    Follow Up:  Follow-up Information     Cindy Spencer NP.    Specialty:  Family Medicine  Why:  walk-in clinic only  Contact information:  [address]  [place] [address]  [phone]             Carla Mcknight MD. Go on 8/29/2017.    Specialty:  Hospitalist  Why:  @2:30 in the lab and 3pm with Dr. Mcknight in the priority care clinic  Contact information:  [address]  [address]  [facility] [id number] [phone]             [facility]    Specialty:  DME Provider  Why:  BIPAP  Contact information:  [address]  [address]  [place] [address]  [phone]             [facility].    Specialties:  Home Health Services, Physical Therapy, Occupational Therapy  Why:  Home Health  Contact information:  [address]  [address]  [facility] [id number] [phone]                 Patient Instructions:     BIPAP FOR HOME USE   Order Specific Question Answer Comments   Type: BiPap    BiPap setting (cmH20) Inspiratory: 16    BiPap setting (cmH20) Expiratory: 5    Length of need (1-99 months): 99    Humidification: Heated    Type of mask: FFM    Accessories needed: Headgear    Accessories needed: Tubing    Accessories needed: Humidifier chamber    Accessories needed: Chin strap    Accessories needed: Filters    BiPap supply refills: 12      Diet Diabetic 1800 Calories     Activity as tolerated     Call MD for:  increased confusion or weakness       Medications:  Reconciled Home Medications:   Discharge Medication List as of 8/23/2017  12:08 PM      START taking these medications    Details   cephALEXin (KEFLEX) 500 MG capsule Take 1 capsule (500 mg total) by mouth 4 (four) times daily., Starting Wed 8/23/2017, Until Sun 8/27/2017, Normal      nystatin-triamcinolone (MYCOLOG) ointment Apply topically 2 (two) times daily. To areas of redness, Starting Wed 8/23/2017, Normal         CONTINUE these medications which have CHANGED    Details   pregabalin (LYRICA) 50 MG capsule Take 1 capsule (50 mg total) by mouth 2 (two) times daily., Starting Wed 8/23/2017, No Print         CONTINUE these medications which have NOT CHANGED    Details   albuterol (PROVENTIL/VENTOLIN) 90 mcg/actuation inhaler Inhale 2 puffs into the lungs every 6 (six) hours as needed for Wheezing., Starting 11/13/2013, Until Sun 3/6/16, Print      amlodipine (NORVASC) 5 MG tablet Take 1 tablet (5 mg total) by mouth once daily., Starting 1/30/2017, Until Tue 1/30/18, Print      atorvastatin (LIPITOR) 40 MG tablet Take 1 tablet (40 mg total) by mouth once daily., Starting 1/30/2017, Until Tue 1/30/18, Print      donepezil (ARICEPT) 5 MG tablet Take 5 mg by mouth every evening., Until Discontinued, Historical Med      duloxetine (CYMBALTA) 60 MG capsule Take 1 capsule (60 mg total) by mouth once daily., Starting 1/30/2017, Until Discontinued, No Print      fish oil-omega-3 fatty acids 300-1,000 mg capsule Take 2 g by mouth once daily., Until Discontinued, Historical Med      fluticasone-salmeterol 100-50 mcg/dose (ADVAIR) 100-50 mcg/dose diskus inhaler Inhale 1 puff into the lungs 2 (two) times daily., Until Discontinued, Historical Med      glipiZIDE (GLUCOTROL) 5 MG tablet Take 0.5 tablets (2.5 mg total) by mouth daily with breakfast., Starting 1/30/2017, Until Discontinued, Print      multivitamin (THERAGRAN) per tablet Take 1 tablet by mouth once daily., Until Discontinued, Historical Med      omega-3 acid ethyl esters (LOVAZA) 1 gram capsule Take 2 g by mouth 2 (two) times daily.,  Until Discontinued, Historical Med      pentoxifylline (TRENTAL) 400 mg TbSR Take 400 mg by mouth 2 (two) times daily with meals., Until Discontinued, Historical Med           HOS POC IP DISCHARGE SUMMARY    Drake Coyne MD  Department of Hospital Medicine  [facility]

## 2017-08-23 NOTE — SUBJECTIVE & OBJECTIVE
Interval History: Weaned to nasal cannula and transferred out of the ICU yesterday.    Review of Systems   Constitutional: Negative for chills and fever.   Gastrointestinal: Negative for nausea and vomiting.     Objective:     Vital Signs (Most Recent):  Temp: 97.8 °F (36.6 °C) (08/23/17 0816)  Pulse: 67 (08/23/17 0816)  Resp: 19 (08/23/17 0816)  BP: (!) 124/58 (08/23/17 0816)  SpO2: 99 % (08/23/17 0811) Vital Signs (24h Range):  Temp:  [96.7 °F (35.9 °C)-99 °F (37.2 °C)] 97.8 °F (36.6 °C)  Pulse:  [63-86] 67  Resp:  [13-58] 19  SpO2:  [92 %-99 %] 99 %  BP: (104-141)/(58-76) 124/58     Weight: (!) 148 kg (326 lb 4.5 oz)  Body mass index is 52.66 kg/m².    Intake/Output Summary (Last 24 hours) at 08/23/17 0834  Last data filed at 08/23/17 0500   Gross per 24 hour   Intake             1320 ml   Output              425 ml   Net              895 ml      Physical Exam   Constitutional: She appears well-developed and well-nourished. No distress. Nasal cannula in place.   HENT:   Head: Normocephalic and atraumatic.   Cardiovascular: Normal rate and regular rhythm.    No murmur heard.  Pulmonary/Chest: Effort normal. No respiratory distress. She has decreased breath sounds. She has no wheezes.   Abdominal: Soft. Bowel sounds are normal. She exhibits no distension. There is no tenderness.   Musculoskeletal: She exhibits edema.   Neurological: She is alert.   Skin: Skin is warm and dry. There is erythema.   Nursing note and vitals reviewed.      Significant Labs:   ABGs:     Recent Labs  Lab 08/22/17  1128   PH 7.373   PCO2 73.1*   HCO3 42.5*   POCSATURATED 90*   BE 17     CBC:     Recent Labs  Lab 08/22/17  0331 08/23/17  0551   WBC 7.62 9.92   HGB 10.6* 10.3*   HCT 35.4* 34.2*    310     CMP:     Recent Labs  Lab 08/22/17  0331 08/23/17  0551    141   K 4.6 4.0   CL 98 98   CO2 38* 37*   * 109   BUN 28* 34*   CREATININE 1.0 1.1   CALCIUM 9.0 9.1   ANIONGAP 5* 6*   EGFRNONAA 54* 48*     Magnesium:      Recent Labs  Lab 08/22/17  0331   MG 1.8     POCT Glucose:     Recent Labs  Lab 08/22/17  1728 08/22/17  2200 08/23/17  0606   POCTGLUCOSE 134* 147* 114*       Significant Imaging: I have reviewed all pertinent imaging results/findings within the past 24 hours.

## 2017-08-23 NOTE — PROGRESS NOTES
[facility] Hospital Medicine  Progress Note    Patient Name: Adela Jessica  MRN: [id number]  Patient Class: IP- Inpatient   Admission Date: 8/20/2017  Length of Stay: 3 days  Attending Physician: Drake Coyne MD  Primary Care Provider: Cindy Spencer NP        Subjective:     Principal Problem:Acute on chronic respiratory failure with hypoxia and hypercapnia    HPI:  Adela Jessica is a 77 y.o. white woman with morbid obesity, obstructive sleep apnea, chronic obstructive pulmonary disease, chronic hypoxia and hypercapnia (on supplemental oxygen), chronic kidney disease stage 3, Alzheimer's dementia, hypertension, hyperlipidemia, and diabetes mellitus type 2.  She lives with her sister in [place], [place].  Her primary care provider is nurse practitioner Cindy Spencer.     She presented to [facility] on 8/20/17 with weakness, somnolence, and delirium.  Her home CPAP machine was broken.  Her nephew reported that EMS checked her 2 days prior and noted hypoxia, but she refused to go to the hospital at that time.  EMS gave her a new nasal cannula because hers was not operating correctly.  She worsened before being taken to the ED, where ABG showed pH 7.288, pCO2 83.4, pO2 84.  She was put on CPAP.  She also had left leg cellulitis that had been present for the past week, likely caused by a scratch from her dog.  She was given piperacillin-tazobactam.  She also had Candida intertrigo, which she was treating with zinc oxide.  She was admitted to [facility].        Hospital Course:  She was continued on BiPAP for the next couple of days.  IV cefazolin was given for her cellulitis.  Nystatin-triamcinolone was given for her Candida intertrigo.  She was weaned back to her usual 2 liters nasal cannula by 8/23/17.    Interval History: Weaned to nasal cannula and transferred out of the ICU yesterday.    Review of Systems   Constitutional: Negative for chills and fever.    Gastrointestinal: Negative for nausea and vomiting.     Objective:     Vital Signs (Most Recent):  Temp: 97.8 °F (36.6 °C) (08/23/17 0816)  Pulse: 67 (08/23/17 0816)  Resp: 19 (08/23/17 0816)  BP: (!) 124/58 (08/23/17 0816)  SpO2: 99 % (08/23/17 0811) Vital Signs (24h Range):  Temp:  [96.7 °F (35.9 °C)-99 °F (37.2 °C)] 97.8 °F (36.6 °C)  Pulse:  [63-86] 67  Resp:  [13-58] 19  SpO2:  [92 %-99 %] 99 %  BP: (104-141)/(58-76) 124/58     Weight: (!) 148 kg (326 lb 4.5 oz)  Body mass index is 52.66 kg/m².    Intake/Output Summary (Last 24 hours) at 08/23/17 0834  Last data filed at 08/23/17 0500   Gross per 24 hour   Intake             1320 ml   Output              425 ml   Net              895 ml      Physical Exam   Constitutional: She appears well-developed and well-nourished. No distress. Nasal cannula in place.   HENT:   Head: Normocephalic and atraumatic.   Cardiovascular: Normal rate and regular rhythm.    No murmur heard.  Pulmonary/Chest: Effort normal. No respiratory distress. She has decreased breath sounds. She has no wheezes.   Abdominal: Soft. Bowel sounds are normal. She exhibits no distension. There is no tenderness.   Musculoskeletal: She exhibits edema.   Neurological: She is alert.   Skin: Skin is warm and dry. There is erythema.   Nursing note and vitals reviewed.      Significant Labs:   ABGs:     Recent Labs  Lab 08/22/17  1128   PH 7.373   PCO2 73.1*   HCO3 42.5*   POCSATURATED 90*   BE 17     CBC:     Recent Labs  Lab 08/22/17  0331 08/23/17  0551   WBC 7.62 9.92   HGB 10.6* 10.3*   HCT 35.4* 34.2*    310     CMP:     Recent Labs  Lab 08/22/17  0331 08/23/17  0551    141   K 4.6 4.0   CL 98 98   CO2 38* 37*   * 109   BUN 28* 34*   CREATININE 1.0 1.1   CALCIUM 9.0 9.1   ANIONGAP 5* 6*   EGFRNONAA 54* 48*     Magnesium:     Recent Labs  Lab 08/22/17  0331   MG 1.8     POCT Glucose:     Recent Labs  Lab 08/22/17  1728 08/22/17  2200 08/23/17  0606   POCTGLUCOSE 134* 147* 114*        Significant Imaging: I have reviewed all pertinent imaging results/findings within the past 24 hours.     Assessment/Plan:      Chronic respiratory failure with hypoxia and hypercapnia    Centrilobular emphysema  HIWOT and COPD overlap syndrome  Prescribe BiPAP.          Yeast infection of the skin    Cleans BID and apply nystatin-triamcinolone ointment TID        Cellulitis of left lower extremity    Prescribe cephalexin.          Alzheimer's dementia without behavioral disturbance    Continue donepezil 5 mg HS.          Normocytic anemia    At Baseline. Hemoccult + Brown stool per rectal exam. Monitor hemoglobin. Does not appear to be acute GI bleeding.         Morbid obesity due to excess calories    BMI 56.7.          Mixed hyperlipidemia    Continue home atorvastatin 40 mg daily          Essential hypertension    Continue home amlodipine 5 mg daily.  Hydralazine prn.           Type 2 diabetes mellitus treated without insulin    Hemoglobin A1c 5.5%.  Takes glipizide 5 mg daily.  Holding.  Continue sliding scale insulin aspart.        CKD (chronic kidney disease) stage 3, GFR 30-59 ml/min    Stable.            VTE Risk Mitigation         Ordered     enoxaparin injection 40 mg  Daily     Route:  Subcutaneous        08/21/17 0738     High Risk of VTE  Once      08/21/17 0738        Disposition: Home today.    Time Spent:  I spent 35 minutes on this discharge, which includes examination, reviewing hospital course with patient/family, reviewing discharge medications and arranging follow-up care.      Drake Coyne MD  Department of Hospital Medicine   [facility]

## 2017-08-23 NOTE — PLAN OF CARE
[facility] HOME HEALTH ORDERS  FACE TO FACE ENCOUNTER    Patient Name: Adela Jessica  YOB: 1940    PCP: Cindy Spencer NP   PCP Address: [address] [place] [address] / [place] [address]  PCP Phone Number: [phone]  PCP Fax: [phone]    Encounter Date: 08/23/2017    Admit to Home Health    Diagnoses:  Active Hospital Problems    Diagnosis  POA    Yeast infection of the skin [B37.2]  Yes    Supplemental oxygen dependent [Z99.81]  Not Applicable     Chronic    Chronic respiratory failure with hypoxia and hypercapnia [J96.11, J96.12]  Yes     Chronic    Cellulitis of left lower extremity [L03.116]  Yes    HIWOT and COPD overlap syndrome [G47.33, J44.9]  Yes     Chronic    Centrilobular emphysema [J43.2]  Yes     Chronic    Morbid obesity due to excess calories [E66.01]  Yes    Normocytic anemia [D64.9]  Yes    Alzheimer's dementia without behavioral disturbance [G30.9, F02.80]  Yes     Chronic    CKD (chronic kidney disease) stage 3, GFR 30-59 ml/min [N18.3]  Yes     Chronic    Essential hypertension [I10]  Yes     Chronic    Mixed hyperlipidemia [E78.2]  Yes    Type 2 diabetes mellitus treated without insulin [E11.9]  Yes     Chronic      Resolved Hospital Problems    Diagnosis Date Resolved POA    *Acute on chronic respiratory failure with hypoxia and hypercapnia [J96.21, J96.22] 08/23/2017 Yes     Priority: 1 - High    Acute respiratory acidosis [E87.2] 08/23/2017 Yes    Encephalopathy acute [G93.40] 08/22/2017 Yes       No future appointments.  Follow-up Information     Cindy Spencer NP.    Specialty:  Family Medicine  Why:  walk-in clinic only  Contact information:  [address] [place] [address]  [phone]             Carla Mcknight MD.    Specialty:  Hospitalist  Why:  appt has been requested  Contact information:  [address] [address] [phone]                     I have seen and examined this patient face to face today.  My clinical findings that support the need for the home health skilled services and home bound status are the following:  Weakness/numbness causing balance and gait disturbance due to COPD Exacerbation making it taxing to leave home.  Mental confusion making it unsafe for patient to leave home alone due to  Dementia.    Allergies:Review of patient's allergies indicates:  No Known Allergies    Diet: diabetic diet: 1800 calorie    Activities: activity as tolerated    Nursing:   SN to complete comprehensive assessment including routine vital signs. Instruct on disease process and s/s of complications to report to MD. Review/verify medication list sent home with the patient at time of discharge  and instruct patient/caregiver as needed. Frequency may be adjusted depending on start of care date.    Notify MD if SBP > 160 or < 90; DBP > 90 or < 50; HR > 120 or < 50; Temp > 101      CONSULTS:    Physical Therapy to evaluate and treat. Evaluate for home safety and equipment needs; Establish/upgrade home exercise program. Perform / instruct on therapeutic exercises, gait training, transfer training, and Range of Motion.  Occupational Therapy to evaluate and treat. Evaluate home environment for safety and equipment needs. Perform/Instruct on transfers, ADL training, ROM, and therapeutic exercises.  Aide to provide assistance with personal care, ADLs, and vital signs.    MISCELLANEOUS CARE:  Home Oxygen:  No change    WOUND CARE ORDERS  n/a      Medications: Review discharge medications with patient and family and provide education.      Current Discharge Medication List      START taking these medications    Details   cephALEXin (KEFLEX) 500 MG capsule Take 1 capsule (500 mg total) by mouth 4 (four) times daily.  Qty: 16 capsule, Refills: 0      nystatin-triamcinolone (MYCOLOG) ointment Apply topically 2 (two) times daily. To areas of redness  Qty: 60 g, Refills: 0         CONTINUE these medications which have CHANGED     Details   pregabalin (LYRICA) 50 MG capsule Take 1 capsule (50 mg total) by mouth 2 (two) times daily.         CONTINUE these medications which have NOT CHANGED    Details   albuterol (PROVENTIL/VENTOLIN) 90 mcg/actuation inhaler Inhale 2 puffs into the lungs every 6 (six) hours as needed for Wheezing.  Qty: 2 each, Refills: 0    Comments: MDI inhaler      amlodipine (NORVASC) 5 MG tablet Take 1 tablet (5 mg total) by mouth once daily.  Qty: 30 tablet, Refills: 11      atorvastatin (LIPITOR) 40 MG tablet Take 1 tablet (40 mg total) by mouth once daily.  Qty: 90 tablet, Refills: 3      donepezil (ARICEPT) 5 MG tablet Take 5 mg by mouth every evening.      duloxetine (CYMBALTA) 60 MG capsule Take 1 capsule (60 mg total) by mouth once daily.  Qty: 30 capsule, Refills: 2      fish oil-omega-3 fatty acids 300-1,000 mg capsule Take 2 g by mouth once daily.      fluticasone-salmeterol 100-50 mcg/dose (ADVAIR) 100-50 mcg/dose diskus inhaler Inhale 1 puff into the lungs 2 (two) times daily.      glipiZIDE (GLUCOTROL) 5 MG tablet Take 0.5 tablets (2.5 mg total) by mouth daily with breakfast.  Qty: 15 tablet, Refills: 1      multivitamin (THERAGRAN) per tablet Take 1 tablet by mouth once daily.      omega-3 acid ethyl esters (LOVAZA) 1 gram capsule Take 2 g by mouth 2 (two) times daily.      pentoxifylline (TRENTAL) 400 mg TbSR Take 400 mg by mouth 2 (two) times daily with meals.             I certify that this patient is confined to her home and needs intermittent skilled nursing care, physical therapy and occupational therapy.      Drake Coyne MD  [facility]  08/23/2017

## 2017-08-23 NOTE — PROGRESS NOTES
[facility]  Quang Church RN  Transitional Navigator/Case Management  [phone]

## 2017-08-23 NOTE — PLAN OF CARE
Discharged home with instructions after seen by Dr Peoples team,Iv Telemetry discontinued . CS to arrange for C folow upbefore departure

## 2017-08-23 NOTE — PLAN OF CARE
Patient choice form for [facility] has been signed, patient has history with that company.  TN confirmed with [facility] that patient has been approved for Bipap for Home Use, patient has requested home delivery on the unit.  Patient's nephew is currently bringing her portable O2 to the hospital for her DC home. Accepted in [facility].    Follow-up With  Details  Why  Contact Info   Cindy Spencer NP    walk-in clinic only  [address] [facility] [place] [address] [phone]   Carla Mcknight MD  Go on 8/29/2017  @2:30 in the lab and 3pm with Dr. Mcknight in the priority care clinic  [address]  [address]  [facility] [id number] [phone]   [facility]    BIPAP  [address]  [facility] [address]  [phone]   [facility]  [facility] [address]  [address]  [facility] [address]  [phone]          08/23/17 1215   Final Note   Assessment Type Final Discharge Note   Discharge Disposition Home-Health   What phone number can be called within the next 1-3 days to see how you are doing after discharge? [phone]   Hospital Follow Up  Appt(s) scheduled? Yes   Discharge plans and expectations educations in teach back method with documentation complete? Yes   Right Care Referral Info   Post Acute Recommendation Home-care

## 2017-08-24 ENCOUNTER — OUTPATIENT CASE MANAGEMENT (OUTPATIENT)
Dept: ADMINISTRATIVE | Facility: OTHER | Age: 77
End: 2017-08-24

## 2017-08-24 ENCOUNTER — PATIENT OUTREACH (OUTPATIENT)
Dept: ADMINISTRATIVE | Facility: CLINIC | Age: 77
End: 2017-08-24

## 2017-08-24 NOTE — PROGRESS NOTES
Thank you for the referral.  Patient has been assigned to Tamela Gaitan LMSW  for low risk screening for Outpatient Case Management.     Reason for referral: Low risk     Cellulitis of left lower extremity    Thank you,    Sandra Escalera, SSC

## 2017-08-24 NOTE — PATIENT INSTRUCTIONS
COPD Flare    You have had a flare-up of your COPD.  COPD, or chronic obstructive pulmonary disease, is a common lung disease. It causes your airways to become irritated and narrower. This makes it harder for you to breathe. Emphysema and chronic bronchitis are both types of COPD. This is a chronic condition, which means you always have it. Sometimes it gets worse. When this happens, it is called a flare-up.  Symptoms of COPD  People with COPD may have symptoms most of the time. In a flare-up, your symptoms get worse. These symptoms may mean you are having a flare-up:  · Shortness of breath, shallow or rapid breathing, or wheezing that gets worse  · Lung infection  · Cough that gets worse  · More mucus, thicker mucus or mucus of a different color  · Tiredness, decreased energy, or trouble doing your usual activities  · Fever  · Chest tightness  · Your symptoms dont get better even when you use your usual medicines, inhalers, and nebulizer  · Trouble talking  · You feel confused  Causes of flare-ups  Unfortunately, a flare-up can happen even though you did everything right, and you followed your doctors instructions. Some causes of flare-ups are:  · Smoking or secondhand smoke  · Colds, the flu, or respiratory infections  · Air pollution  · Sudden change in the weather  · Dust, irritating chemicals, or strong fumes  · Not taking your medicines as prescribed  Home care  Here are some things you can do at home to treat a flare-up:  · Try not to panic. This makes it harder to breathe, and keeps you from doing the right things.  · Dont smoke or be around others who are smoking.  · Try to drink more fluids than usual during a flare-up, unless your doctor has told you not to because of heart and kidney problems. More fluids can help loosen the mucus.  · Use your inhalers and nebulizer, if you have one, as you have been told to.  · If you were given antibiotics, take them until they are used up or your doctor tells you  to stop. Its important to finish the antibiotics, even though you feel better. This will make sure the infection has cleared.  · If you were given prednisone or another steroid, finish it even if you feel better.  Preventing a flare-up  Even though flare-ups happen, the best way to treat one is to prevent it before it starts. Here are some pointers:  · Dont smoke or be around others who are smoking.  · Take your medicines as you have been told.  · Talk with your doctor about getting a flu shot every year. Also find out if you need a pneumonia shot.  · If there is a weather advisory warning to stay indoors, try to stay inside when possible.  · Try to eat healthy and get plenty of sleep.  · Try to avoid things that usually set you off, like dust, chemical fumes, hairsprays, or strong perfumes.  Follow-up care  Follow up with your healthcare provider, or as advised.  If a culture was done, you will be told if your treatment needs to be changed. You can call as directed for the results.  If X-rays were done, you will be notified of any new findings that may affect your care.  Call 911  Call 911 if any of these occur:  · You have trouble breathing  · You feel confused or its difficult to wake you up  · You faint or lose consciousness  · You have a rapid heart rate  · You have new pain in your chest, arm, shoulder, neck or upper back  When to seek medical advice  Call your healthcare provider right away if any of these occur:  · Wheezing or shortness of breath gets worse  · You need to use your inhalers more often than usual without relief  · Fever of 100.4°F (38ºC) or higher, or as directed by your healthcare provider  · Coughing up lots of dark-colored or bloody mucus (sputum)  · Chest pain with each breath  · You do not start to get better within 24 hours  · Swelling of your ankles gets worse  · Dizziness or weakness  Date Last Reviewed: 9/1/2017  © 0081-1305 [employer]. [address] [facility],  [place] [address]. All rights reserved. This information is not intended as a substitute for professional medical care. Always follow your healthcare professional's instructions.

## 2017-08-25 LAB
BACTERIA BLD CULT: NORMAL
BACTERIA BLD CULT: NORMAL

## 2017-08-28 ENCOUNTER — TELEPHONE (OUTPATIENT)
Dept: PRIMARY CARE CLINIC | Facility: CLINIC | Age: 77
End: 2017-08-28

## 2017-08-28 DIAGNOSIS — J96.90 RESPIRATORY FAILURE, UNSPECIFIED CHRONICITY, UNSPECIFIED WHETHER WITH HYPOXIA OR HYPERCAPNIA: Primary | ICD-10-CM

## 2017-08-28 NOTE — TELEPHONE ENCOUNTER
I returned Isis's  (home health nurse-[facility]) phone call to see when Mrs. Chapman wanted to reschedule her appointment for. She stated that she would have to talk to her nephew and then call us back. The home health nurse wanted to let us know that her BP was 165/80 and then after taking medication and waiting a while she tested it again and it was 140/60. She also wants to know if she can take mucinex.

## 2017-08-29 ENCOUNTER — OUTPATIENT CASE MANAGEMENT (OUTPATIENT)
Dept: ADMINISTRATIVE | Facility: OTHER | Age: 77
End: 2017-08-29

## 2017-08-29 NOTE — PROGRESS NOTES
Attempt #:  1  This LMSW attempted to reach patient/caregiver to provide resource and was unable to leave a message requesting a return call.

## 2017-08-30 ENCOUNTER — OUTPATIENT CASE MANAGEMENT (OUTPATIENT)
Dept: ADMINISTRATIVE | Facility: OTHER | Age: 77
End: 2017-08-30

## 2017-08-30 NOTE — PROGRESS NOTES
Attempt #:  2  This LMSW attempted to reach patient/caregiver to provide resource and was unable to leave a msg requesting a return call. Letter with contact information was sent via US mail to patient/caregiver. Referral source notified.

## 2017-08-30 NOTE — LETTER
August 30, 2017    Adela Jessica  [address] [facility] [place] [address]             [facility] [address] [place] [address] Dear Ms. Jessica:    I am writing from the Outpatient Complex Care Management Department at [facility].  I received a referral from Dr. Coyne  to contact you or your caregiver regarding any needs you may have. I have attempted to contact you or your cargeiver by phone two times unsuccessfully.  Please contact the Outpatient Complex Care Management Department at [phone] if you would like to discuss your needs.      Sincerely,         Tamela Gallardo LMSW

## 2017-09-05 ENCOUNTER — TELEPHONE (OUTPATIENT)
Dept: PRIMARY CARE CLINIC | Facility: CLINIC | Age: 77
End: 2017-09-05

## 2017-09-05 NOTE — TELEPHONE ENCOUNTER
Called patient to reschedule appointment, the patient could not make the appointment due to not having a ride to get here. I tried to leave a voicemail with the person that is responsible for her appointments but there was no voicemail set up.

## 2017-09-05 NOTE — TELEPHONE ENCOUNTER
Called patients relative to reschedule appointment. We rescheduled her appointment for tomorrow at 11 am with labs for 10:30 am.

## 2017-09-06 ENCOUNTER — TELEPHONE (OUTPATIENT)
Dept: PRIMARY CARE CLINIC | Facility: CLINIC | Age: 77
End: 2017-09-06

## 2017-09-06 NOTE — TELEPHONE ENCOUNTER
Alli Kitchen called to reschedule Adela's appointment because he went to  his [facility] to bring to the appointment. The rehab facility wouldn't let her leave and that is when he realized he was trying to bring the wrong patient. We rescheduled her appointment for next Wednesday the 13th for 11:00 am.

## 2017-09-11 ENCOUNTER — TELEPHONE (OUTPATIENT)
Dept: PRIMARY CARE CLINIC | Facility: CLINIC | Age: 77
End: 2017-09-11

## 2017-09-11 NOTE — TELEPHONE ENCOUNTER
Isis from [facility] called in to let us know that the patient is coughing up green mucus. She wanted to let us know that she may need a chest xray during her visit in case she has pneumonia. She stated that the patient is not complying with her medications and is not eating breakfast. We will try to move her appointment to Tuesday the 12th if possible, otherwise the patient will keep her scheduled appointment for Wednesday September 13th.

## 2017-09-13 ENCOUNTER — OFFICE VISIT (OUTPATIENT)
Dept: PRIMARY CARE CLINIC | Facility: CLINIC | Age: 77
End: 2017-09-13
Payer: MEDICARE

## 2017-09-13 ENCOUNTER — HOSPITAL ENCOUNTER (OUTPATIENT)
Dept: RADIOLOGY | Facility: HOSPITAL | Age: 77
Discharge: HOME | End: 2017-09-13
Attending: INTERNAL MEDICINE
Payer: MEDICARE

## 2017-09-13 VITALS
SYSTOLIC BLOOD PRESSURE: 119 MMHG | TEMPERATURE: 99 F | WEIGHT: 293 LBS | BODY MASS INDEX: 51.72 KG/M2 | DIASTOLIC BLOOD PRESSURE: 69 MMHG | HEART RATE: 79 BPM

## 2017-09-13 DIAGNOSIS — G47.33 OSA AND COPD OVERLAP SYNDROME: Chronic | ICD-10-CM

## 2017-09-13 DIAGNOSIS — L03.116 CELLULITIS OF LEFT LOWER EXTREMITY: ICD-10-CM

## 2017-09-13 DIAGNOSIS — R05.8 COUGH WITH SPUTUM: ICD-10-CM

## 2017-09-13 DIAGNOSIS — E66.01 MORBID OBESITY DUE TO EXCESS CALORIES: ICD-10-CM

## 2017-09-13 DIAGNOSIS — J96.11 CHRONIC RESPIRATORY FAILURE WITH HYPOXIA AND HYPERCAPNIA: Chronic | ICD-10-CM

## 2017-09-13 DIAGNOSIS — I10 ESSENTIAL HYPERTENSION: Chronic | ICD-10-CM

## 2017-09-13 DIAGNOSIS — J44.9 OSA AND COPD OVERLAP SYNDROME: Chronic | ICD-10-CM

## 2017-09-13 DIAGNOSIS — Z99.81 SUPPLEMENTAL OXYGEN DEPENDENT: Chronic | ICD-10-CM

## 2017-09-13 DIAGNOSIS — J18.9 PNEUMONIA OF BOTH LOWER LOBES DUE TO INFECTIOUS ORGANISM: Primary | ICD-10-CM

## 2017-09-13 DIAGNOSIS — J96.12 CHRONIC RESPIRATORY FAILURE WITH HYPOXIA AND HYPERCAPNIA: Chronic | ICD-10-CM

## 2017-09-13 PROCEDURE — 99999 PR PBB SHADOW E&M-EST. PATIENT-LVL III: CPT | Mod: PBBFAC,,, | Performed by: INTERNAL MEDICINE

## 2017-09-13 PROCEDURE — 99496 TRANSJ CARE MGMT HIGH F2F 7D: CPT | Mod: S$GLB,,, | Performed by: INTERNAL MEDICINE

## 2017-09-13 PROCEDURE — 71020 XR CHEST PA AND LATERAL: CPT | Mod: TC

## 2017-09-13 PROCEDURE — 71020 XR CHEST PA AND LATERAL: CPT | Mod: 26,,, | Performed by: RADIOLOGY

## 2017-09-13 RX ORDER — DOXYCYCLINE 100 MG/1
100 CAPSULE ORAL 2 TIMES DAILY
Qty: 14 CAPSULE | Refills: 0 | Status: SHIPPED | OUTPATIENT
Start: 2017-09-13 | End: 2017-09-20

## 2017-09-13 NOTE — PROGRESS NOTES
[facility]  New Visit Progress Note   Recent Hospital Discharge     PRESENTING HISTORY     Chief Complaint/Reason for Admission:  Follow up Hospital Discharge   No chief complaint on file.    PCP: Cindy Spencer NP    History of Present Illness:  Ms. Adela Jessica is a 77 y.o. female who was recently admitted to the hospital.    ___________________________________________________________________    Today:  [facility] to [facility] for [facility] follow up.  Recently hospitalized for acute on chronic hypoxemic respiratory failure.  Also found to have leg cellulitis related to a recent dog scratch.  Responded well to supportive care.  Home BiPAP ordered for nightly use.  Patient discharged home with home health services and instructions to complete a course of Keflex for cellulitis treatment.     Patient accompanied today by her nephew.  She is in a wheelchair and has supplemental oxygen in place.   Patient is mostly wheelchair and bed bound, but able to ambulate short distances in her home with some assistance.  Requires assistance with transfers to tub and toilet. Requires assistance with hygiene and ADL's.    Social situation is unstable. Patient living in [facility] with multiple, extended family members and three dogs.   Many of those family members are also disabled or in poor health.  There is a lack of consistent phone service as the patient and others living there are unable to pay the cell phone bills.   She has minimal to no assistance with meals, cleaning, or ADL's depending on who is present.  The patients nephew who has accompanied her today reports the trailer is filthy with trash, dirty dishes, and animal feces.  He is encouraging patient to exert authority over who lives there since she pays the bills.   I see attempts from Outpatient Case Management to contact the patient- all unsuccessful.  I have obtained the cell phone number of the nephew with her today and I will place this number in her  chart and also provide it to the case management team.       Patient has no knowledge of her medications. Unable to verify what she is actually taking.  She is receiving home health and it seems as if the home health nurse is organizing the medications in a pill dispenser.  I will communicate with the home health care team regarding medication management.    Plans were made for BiPap machine to be delivered to her home. The patient has not received the machine.   I have been in touch with the hospital case management team and [facility] to coordinate delivery.     The patient appears to have ongoing cellulitis of her LLE.  It is unclear if she took the course of prescribed Keflex.  She also has a cough productive of thick green sputum and wheezing on exam today.   Chest X Ray is abnormal.  I will prescribe a course of doxycycline for coverage of both the cellulitis and PNA.       Review of Systems  General ROS: negative for chills, fever or weight loss  Psychological ROS: negative for hallucination, depression or suicidal ideation  Ophthalmic ROS: negative for blurry vision, photophobia or eye pain  ENT ROS: negative for epistaxis, sore throat or rhinorrhea  Respiratory ROS: + cough productive of green sputum + wheezing + dyspnea  Cardiovascular ROS: no chest pain   Gastrointestinal ROS: no abdominal pain, change in bowel habits, or black/ bloody stools  Genito-Urinary ROS: no dysuria, trouble voiding, or hematuria  Musculoskeletal ROS: negative for gait disturbance or muscular weakness  Neurological ROS: no syncope or seizures; no ataxia  Dermatological ROS: negative for pruritis, rash and jaundice  + erythema, pain to lateral left shin      PAST HISTORY:     Past Medical History:   Diagnosis Date    Arthritis     CKD (chronic kidney disease)     Dementia     Depression     DM2 (diabetes mellitus, type 2)     GERD (gastroesophageal reflux disease)     HLD (hyperlipidemia)     HTN (hypertension)     HIWOT on  CPAP     Streptococcal pneumonia 1/27/2017       Past Surgical History:   Procedure Laterality Date    HYSTERECTOMY         No family history on file.    Social History     Social History    Marital status: Single     Spouse name: N/A    Number of children: N/A    Years of education: N/A     Occupational History          retired     Social History Main Topics    Smoking status: Never Smoker    Smokeless tobacco: Never Used    Alcohol use No    Drug use: No    Sexual activity: Not on file     Other Topics Concern    Not on file     Social History Narrative    No narrative on file       MEDICATIONS & ALLERGIES:     Current Outpatient Prescriptions on File Prior to Visit   Medication Sig Dispense Refill    albuterol (PROVENTIL/VENTOLIN) 90 mcg/actuation inhaler Inhale 2 puffs into the lungs every 6 (six) hours as needed for Wheezing. 2 each 0    amlodipine (NORVASC) 5 MG tablet Take 1 tablet (5 mg total) by mouth once daily. 30 tablet 11    atorvastatin (LIPITOR) 40 MG tablet Take 1 tablet (40 mg total) by mouth once daily. 90 tablet 3    donepezil (ARICEPT) 5 MG tablet Take 5 mg by mouth every evening.      duloxetine (CYMBALTA) 60 MG capsule Take 1 capsule (60 mg total) by mouth once daily. 30 capsule 2    fish oil-omega-3 fatty acids 300-1,000 mg capsule Take 2 g by mouth once daily.      fluticasone-salmeterol 100-50 mcg/dose (ADVAIR) 100-50 mcg/dose diskus inhaler Inhale 1 puff into the lungs 2 (two) times daily.      glipiZIDE (GLUCOTROL) 5 MG tablet Take 0.5 tablets (2.5 mg total) by mouth daily with breakfast. 15 tablet 1    multivitamin (THERAGRAN) per tablet Take 1 tablet by mouth once daily.      nystatin-triamcinolone (MYCOLOG) ointment Apply topically 2 (two) times daily. To areas of redness 60 g 0    omega-3 acid ethyl esters (LOVAZA) 1 gram capsule Take 2 g by mouth 2 (two) times daily.      pentoxifylline (TRENTAL) 400 mg TbSR Take 400 mg by mouth 2 (two) times daily with  meals.      pregabalin (LYRICA) 50 MG capsule Take 1 capsule (50 mg total) by mouth 2 (two) times daily.       Current Facility-Administered Medications on File Prior to Visit   Medication Dose Route Frequency Provider Last Rate Last Dose    etomidate injection   Intravenous PRN Satinder Freire MD   20 mg at 11/11/13 0019    propofol (DIPRIVAN) 10 mg/mL infusion   Intravenous PRN Satinder Freire MD   50 mg at 11/11/13 0028    succinylcholine injection    PRN Satinder Freire MD   200 mg at 11/11/13 0016        Review of patient's allergies indicates:  No Known Allergies    OBJECTIVE:     Vital Signs:  Vitals:    09/13/17 1140   BP: 119/69   Pulse: 79   Temp: 98.5 °F (36.9 °C)     Wt Readings from Last 1 Encounters:   08/22/17 0509 (!) 148 kg (326 lb 4.5 oz)   08/21/17 1234 (!) 150 kg (330 lb 11 oz)   08/20/17 1743 (!) 145.2 kg (320 lb)     Body mass index is 51.72 kg/m².       Physical Exam:  /69 (BP Location: Left arm, Patient Position: Sitting, BP Method: Small (Automatic))   Pulse 79   Temp 98.5 °F (36.9 °C) (Oral)   Wt (!) 145.4 kg (320 lb 7 oz)   BMI 51.72 kg/m²   General appearance: alert, cooperative, no distress  Constitutional:Oriented to person, place, and time + morbidly obese   HEENT: Normocephalic, atraumatic, neck symmetrical, no nasal discharge   Eyes: conjunctivae/corneas clear, PERRL, EOM's intact  Lungs: + poor inspiratory effort +wheezing, scattered, inspiratory and expiratory   - no crackles   Heart: regular rate and rhythm without rub; no displacement of the PMI   Abdomen: soft, non-tender; bowel sounds normoactive; no organomegaly  Extremities: extremities symmetric; no clubbing, cyanosis, + trace pitting edema, bilateral LE  Integument: + Left lateral shin- erythema, blistering, induration   Neurologic: Alert and oriented X 3, normal strength  Psychiatric: no pressured speech; normal affect;   + evidence of impaired cognition- poor historian, poor insight into medical condition,  no knowledge of medications, unable to answer basic questions regarding her living situation, symptoms, and treatment     Laboratory  Lab Results   Component Value Date    WBC 9.92 08/23/2017    HGB 10.3 (L) 08/23/2017    HCT 34.2 (L) 08/23/2017    MCV 96 08/23/2017     08/23/2017     BMP  Lab Results   Component Value Date     09/13/2017    K 4.4 09/13/2017    CL 95 09/13/2017    CO2 41 (HH) 09/13/2017    BUN 20 09/13/2017    CREATININE 1.1 09/13/2017    CALCIUM 9.8 09/13/2017    ANIONGAP 6 (L) 09/13/2017    ESTGFRAFRICA 56 (A) 09/13/2017    EGFRNONAA 48 (A) 09/13/2017     Lab Results   Component Value Date    ALT 14 08/20/2017    AST 16 08/20/2017    ALKPHOS 114 08/20/2017    BILITOT 0.4 08/20/2017     Lab Results   Component Value Date    INR 0.9 08/20/2017    INR 1.1 03/06/2016     Lab Results   Component Value Date    HGBA1C 5.5 08/22/2017     No results for input(s): POCTGLUCOSE in the last 72 hours.    Diagnostic Results:  Chest X Ray 9/13/17:  Increased attenuation left lung base concerning for segmental area of pneumonia or aspiration atelectasis, better seen on the lateral view.  CHF and cardiomegaly.      TRANSITION OF CARE:     [facility] Contact Note: 8/24/17    Family and/or Caretaker present at visit?  Yes. (patients nephew present today)  Diagnostic tests reviewed/disposition: I have reviewed all completed as well as pending diagnostic tests at the time of discharge.  Disease/illness education: Yes  Home health/community services discussion/referrals: Patient has home health established at her home (trailer). .   Establishment or re-establishment of referral orders for community resources: Needs BiPap, Needs outpatient case management .   Discussion with other health care providers: Discussed with [facility] respiratory team.     ASSESSMENT & PLAN:     Pneumonia of both lower lobes due to infectious organism  Cough with sputum  - Chest X Ray with evidence of newly developing  infiltrate and patient symptomatic with cough/sputum production  - will prescribe doxycycline to cover both PNA and cellulitis; however, need to consider differential of HCAP given her recent hospitalization, will have low threshold for changing ABX treatment if she does not improve  -     X-Ray Chest PA And Lateral; Future; Expected date: 09/13/2017    Cellulitis of left lower extremity  - unclear if she took recent Rx for Keflex- I am unable to verify any of the patients medications and she cannot provide a history  - cellulitis is ongoing and nephew tells me it looks worse than it previously did  - will initiate a course of doxycycline   -     doxycycline (MONODOX) 100 MG capsule; Take 1 capsule (100 mg total) by mouth 2 (two) times daily.  Dispense: 14 capsule; Refill: 0    Morbid obesity due to excess calories  - patient morbidly obese with severely limited mobility  - wheelchair bound and bed bound; at high risk for development of wounds    Chronic respiratory failure with hypoxia and hypercapnia  HIWOT and COPD overlap syndrome  - has continuous NC oxygen and portable tanks  - is supposed to be on home BiPap nightly but does not have working machine at present  - I have communicated with [facility] respiratory supervisor regarding urgent delivery of home BiPap   - serum CO2 noted to be elevated at 41 today- unknown baseline    Supplemental oxygen dependent  - stable on 2.5 L NC oxygen continuous    Essential hypertension  - blood pressure under good control at present      I will [facility], sooner if needed.   Patient at high risk for poor outcome and high risk for hospital re-admission.   She is unable to care for herself properly or safely, has poor social support, and has chronic, advanced medical conditions.   She would benefit from placement in a nursing home or similar long term care environment.     Instructions for the patient:      Scheduled Follow-up :  Future  Appointments  Date Time Provider Department Center   9/21/2017 9:30 AM Carla Mcknight MD [facility]       Post Visit Medication List:     Medication List          Accurate as of 9/13/17 11:59 PM. If you have any questions, ask your nurse or doctor.               START taking these medications    doxycycline 100 MG capsule  Commonly known as:  MONODOX  Take 1 capsule (100 mg total) by mouth 2 (two) times daily.  Started by:  Carla Mcknight MD        CONTINUE taking these medications    albuterol 90 mcg/actuation inhaler  Commonly known as:  PROVENTIL/VENTOLIN  Inhale 2 puffs into the lungs every 6 (six) hours as needed for Wheezing.     amlodipine 5 MG tablet  Commonly known as:  NORVASC  Take 1 tablet (5 mg total) by mouth once daily.     atorvastatin 40 MG tablet  Commonly known as:  LIPITOR  Take 1 tablet (40 mg total) by mouth once daily.     donepezil 5 MG tablet  Commonly known as:  ARICEPT     duloxetine 60 MG capsule  Commonly known as:  CYMBALTA  Take 1 capsule (60 mg total) by mouth once daily.     fish oil-omega-3 fatty acids 300-1,000 mg capsule     fluticasone-salmeterol 100-50 mcg/dose 100-50 mcg/dose diskus inhaler  Commonly known as:  ADVAIR     glipiZIDE 5 MG tablet  Commonly known as:  GLUCOTROL  Take 0.5 tablets (2.5 mg total) by mouth daily with breakfast.     multivitamin per tablet  Commonly known as:  THERAGRAN     nystatin-triamcinolone ointment  Commonly known as:  MYCOLOG  Apply topically 2 (two) times daily. To areas of redness     omega-3 acid ethyl esters 1 gram capsule  Commonly known as:  LOVAZA     pentoxifylline 400 mg Tbsr  Commonly known as:  TRENTAL     pregabalin 50 MG capsule  Commonly known as:  LYRICA  Take 1 capsule (50 mg total) by mouth 2 (two) times daily.           Where to Get Your Medications      These medications were sent to [facility] [place] - [address]  [address], [place]  [address]    Phone:  [phone]   · doxycycline 100 MG capsule         Signing Physician:  Carla Mcknight MD

## 2017-09-14 ENCOUNTER — TELEPHONE (OUTPATIENT)
Dept: PRIMARY CARE CLINIC | Facility: CLINIC | Age: 77
End: 2017-09-14

## 2017-09-14 PROBLEM — J18.9 PNEUMONIA OF BOTH LOWER LOBES DUE TO INFECTIOUS ORGANISM: Status: ACTIVE | Noted: 2017-09-14

## 2017-09-14 NOTE — TELEPHONE ENCOUNTER
Received call from Ruben with [facility] regarding home BiPap delivery. Ruben went to patients house, knocked on door, remained on site for 20 minutes attempting to reach patient or family. No answer at door. No answer to any listed phone number, including cell phone of patients nephew who accompanied her to visit yesterday. No voice mail set up on any available cell phone numbers. Ruben unable to successfully deliver BiPap to the patient.

## 2017-09-18 ENCOUNTER — TELEPHONE (OUTPATIENT)
Dept: PRIMARY CARE CLINIC | Facility: CLINIC | Age: 77
End: 2017-09-18

## 2017-09-18 NOTE — TELEPHONE ENCOUNTER
Left message for Garfield with [facility] to get the patients bi-pap redelivered. Requested a return call.

## 2017-09-18 NOTE — TELEPHONE ENCOUNTER
Returned Isis's call from [facility]. She stated that patient needs a bariatric bedside commode and wanted to know if the patient could take mucinex. She also requested a few more home visits to be authorized for home health. She requested that we set up a new delivery for the bi-pap machine. She stated the the patients o2 sats were at 86%

## 2017-09-19 DIAGNOSIS — R53.81 DEBILITY: Primary | ICD-10-CM

## 2017-09-21 ENCOUNTER — TELEPHONE (OUTPATIENT)
Dept: PRIMARY CARE CLINIC | Facility: CLINIC | Age: 77
End: 2017-09-21

## 2017-09-21 NOTE — TELEPHONE ENCOUNTER
Called patient because she missed her appointment. I spoke to her sister. I asked if she would like to reschedule, the sister said yes but she would have to call back because she did not have anyone to bring her to the appointment.

## 2017-09-26 ENCOUNTER — TELEPHONE (OUTPATIENT)
Dept: PRIMARY CARE CLINIC | Facility: CLINIC | Age: 77
End: 2017-09-26

## 2017-09-26 NOTE — TELEPHONE ENCOUNTER
Spoke to Isis from [facility] regarding the delivery of the patients cpap machine. I told her that I left a message with [facility] to try to redeliver. She will try to follow up with them as well.

## 2018-09-11 ENCOUNTER — HOSPITAL ENCOUNTER (INPATIENT)
Facility: HOSPITAL | Age: 78
LOS: 2 days | Discharge: HOME HEALTH | DRG: 291 | End: 2018-09-13
Attending: EMERGENCY MEDICINE | Admitting: HOSPITALIST
Payer: MEDICARE

## 2018-09-11 DIAGNOSIS — I50.9 CHF (CONGESTIVE HEART FAILURE): ICD-10-CM

## 2018-09-11 DIAGNOSIS — G47.33 OSA (OBSTRUCTIVE SLEEP APNEA): Chronic | ICD-10-CM

## 2018-09-11 DIAGNOSIS — J96.21 ACUTE ON CHRONIC RESPIRATORY FAILURE WITH HYPOXIA: Chronic | ICD-10-CM

## 2018-09-11 DIAGNOSIS — E66.01 MORBID OBESITY DUE TO EXCESS CALORIES: ICD-10-CM

## 2018-09-11 DIAGNOSIS — E11.9 TYPE 2 DIABETES MELLITUS TREATED WITHOUT INSULIN: Primary | Chronic | ICD-10-CM

## 2018-09-11 DIAGNOSIS — R06.00 DYSPNEA: ICD-10-CM

## 2018-09-11 PROBLEM — L30.9 DERMATITIS OF LOWER EXTREMITY: Chronic | Status: ACTIVE | Noted: 2018-09-11

## 2018-09-11 PROBLEM — L03.116 CELLULITIS OF LEFT LOWER EXTREMITY: Status: RESOLVED | Noted: 2017-08-20 | Resolved: 2018-09-11

## 2018-09-11 LAB
ALBUMIN SERPL BCP-MCNC: 2.9 G/DL
ALP SERPL-CCNC: 82 U/L
ALT SERPL W/O P-5'-P-CCNC: 10 U/L
ANION GAP SERPL CALC-SCNC: 9 MMOL/L
AST SERPL-CCNC: 16 U/L
BASOPHILS # BLD AUTO: 0.01 K/UL
BASOPHILS NFR BLD: 0.2 %
BILIRUB SERPL-MCNC: 0.3 MG/DL
BILIRUB UR QL STRIP: NEGATIVE
BNP SERPL-MCNC: 26 PG/ML
BUN SERPL-MCNC: 16 MG/DL
CALCIUM SERPL-MCNC: 9.7 MG/DL
CHLORIDE SERPL-SCNC: 96 MMOL/L
CHOLEST SERPL-MCNC: 123 MG/DL
CHOLEST/HDLC SERPL: 3 {RATIO}
CLARITY UR: CLEAR
CO2 SERPL-SCNC: 39 MMOL/L
COLOR UR: YELLOW
CREAT SERPL-MCNC: 1 MG/DL
DIFFERENTIAL METHOD: ABNORMAL
EOSINOPHIL # BLD AUTO: 0.1 K/UL
EOSINOPHIL NFR BLD: 1.9 %
ERYTHROCYTE [DISTWIDTH] IN BLOOD BY AUTOMATED COUNT: 14 %
EST. GFR  (AFRICAN AMERICAN): >60 ML/MIN/1.73 M^2
EST. GFR  (NON AFRICAN AMERICAN): 54 ML/MIN/1.73 M^2
ESTIMATED AVG GLUCOSE: 94 MG/DL
GLUCOSE SERPL-MCNC: 109 MG/DL
GLUCOSE UR QL STRIP: NEGATIVE
HBA1C MFR BLD HPLC: 4.9 %
HCT VFR BLD AUTO: 33.7 %
HDLC SERPL-MCNC: 41 MG/DL
HDLC SERPL: 33.3 %
HGB BLD-MCNC: 9.7 G/DL
HGB UR QL STRIP: NEGATIVE
KETONES UR QL STRIP: NEGATIVE
LDLC SERPL CALC-MCNC: 65.8 MG/DL
LEUKOCYTE ESTERASE UR QL STRIP: NEGATIVE
LYMPHOCYTES # BLD AUTO: 0.9 K/UL
LYMPHOCYTES NFR BLD: 14.4 %
MCH RBC QN AUTO: 29.8 PG
MCHC RBC AUTO-ENTMCNC: 28.8 G/DL
MCV RBC AUTO: 103 FL
MONOCYTES # BLD AUTO: 0.3 K/UL
MONOCYTES NFR BLD: 4.6 %
NEUTROPHILS # BLD AUTO: 5.1 K/UL
NEUTROPHILS NFR BLD: 78.7 %
NITRITE UR QL STRIP: NEGATIVE
NONHDLC SERPL-MCNC: 82 MG/DL
PH UR STRIP: 7 [PH] (ref 5–8)
PLATELET # BLD AUTO: 240 K/UL
PMV BLD AUTO: 8.4 FL
POCT GLUCOSE: 126 MG/DL (ref 70–110)
POTASSIUM SERPL-SCNC: 4.6 MMOL/L
PROT SERPL-MCNC: 7.4 G/DL
PROT UR QL STRIP: NEGATIVE
RBC # BLD AUTO: 3.26 M/UL
SODIUM SERPL-SCNC: 144 MMOL/L
SP GR UR STRIP: 1.01 (ref 1–1.03)
TRIGL SERPL-MCNC: 81 MG/DL
TROPONIN I SERPL DL<=0.01 NG/ML-MCNC: <0.006 NG/ML
URN SPEC COLLECT METH UR: NORMAL
UROBILINOGEN UR STRIP-ACNC: NEGATIVE EU/DL
WBC # BLD AUTO: 6.46 K/UL

## 2018-09-11 PROCEDURE — 80061 LIPID PANEL: CPT

## 2018-09-11 PROCEDURE — 94761 N-INVAS EAR/PLS OXIMETRY MLT: CPT

## 2018-09-11 PROCEDURE — 25000003 PHARM REV CODE 250: Performed by: PHYSICIAN ASSISTANT

## 2018-09-11 PROCEDURE — 25000242 PHARM REV CODE 250 ALT 637 W/ HCPCS: Performed by: EMERGENCY MEDICINE

## 2018-09-11 PROCEDURE — 25000242 PHARM REV CODE 250 ALT 637 W/ HCPCS: Performed by: PHYSICIAN ASSISTANT

## 2018-09-11 PROCEDURE — 94640 AIRWAY INHALATION TREATMENT: CPT

## 2018-09-11 PROCEDURE — 81003 URINALYSIS AUTO W/O SCOPE: CPT

## 2018-09-11 PROCEDURE — 93005 ELECTROCARDIOGRAM TRACING: CPT

## 2018-09-11 PROCEDURE — 11000001 HC ACUTE MED/SURG PRIVATE ROOM

## 2018-09-11 PROCEDURE — 83880 ASSAY OF NATRIURETIC PEPTIDE: CPT

## 2018-09-11 PROCEDURE — 96374 THER/PROPH/DIAG INJ IV PUSH: CPT

## 2018-09-11 PROCEDURE — 36415 COLL VENOUS BLD VENIPUNCTURE: CPT

## 2018-09-11 PROCEDURE — 80053 COMPREHEN METABOLIC PANEL: CPT

## 2018-09-11 PROCEDURE — 93010 ELECTROCARDIOGRAM REPORT: CPT | Mod: ,,, | Performed by: INTERNAL MEDICINE

## 2018-09-11 PROCEDURE — 94644 CONT INHLJ TX 1ST HOUR: CPT

## 2018-09-11 PROCEDURE — 85025 COMPLETE CBC W/AUTO DIFF WBC: CPT

## 2018-09-11 PROCEDURE — 63600175 PHARM REV CODE 636 W HCPCS: Performed by: PHYSICIAN ASSISTANT

## 2018-09-11 PROCEDURE — 83036 HEMOGLOBIN GLYCOSYLATED A1C: CPT

## 2018-09-11 PROCEDURE — 99285 EMERGENCY DEPT VISIT HI MDM: CPT

## 2018-09-11 PROCEDURE — 84484 ASSAY OF TROPONIN QUANT: CPT

## 2018-09-11 RX ORDER — FUROSEMIDE 10 MG/ML
40 INJECTION INTRAMUSCULAR; INTRAVENOUS ONCE
Status: COMPLETED | OUTPATIENT
Start: 2018-09-11 | End: 2018-09-11

## 2018-09-11 RX ORDER — PENTOXIFYLLINE 400 MG/1
400 TABLET, EXTENDED RELEASE ORAL 2 TIMES DAILY WITH MEALS
Status: DISCONTINUED | OUTPATIENT
Start: 2018-09-11 | End: 2018-09-13 | Stop reason: HOSPADM

## 2018-09-11 RX ORDER — ACETAMINOPHEN 325 MG/1
650 TABLET ORAL EVERY 4 HOURS PRN
Status: DISCONTINUED | OUTPATIENT
Start: 2018-09-11 | End: 2018-09-13 | Stop reason: HOSPADM

## 2018-09-11 RX ORDER — IPRATROPIUM BROMIDE AND ALBUTEROL SULFATE 2.5; .5 MG/3ML; MG/3ML
3 SOLUTION RESPIRATORY (INHALATION) EVERY 4 HOURS
Status: DISCONTINUED | OUTPATIENT
Start: 2018-09-11 | End: 2018-09-13 | Stop reason: HOSPADM

## 2018-09-11 RX ORDER — SODIUM CHLORIDE 0.9 % (FLUSH) 0.9 %
5 SYRINGE (ML) INJECTION
Status: DISCONTINUED | OUTPATIENT
Start: 2018-09-11 | End: 2018-09-13 | Stop reason: HOSPADM

## 2018-09-11 RX ORDER — ENOXAPARIN SODIUM 100 MG/ML
40 INJECTION SUBCUTANEOUS EVERY 24 HOURS
Status: DISCONTINUED | OUTPATIENT
Start: 2018-09-11 | End: 2018-09-13 | Stop reason: HOSPADM

## 2018-09-11 RX ORDER — ONDANSETRON 8 MG/1
8 TABLET, ORALLY DISINTEGRATING ORAL EVERY 8 HOURS PRN
Status: DISCONTINUED | OUTPATIENT
Start: 2018-09-11 | End: 2018-09-13 | Stop reason: HOSPADM

## 2018-09-11 RX ORDER — ACETAMINOPHEN 325 MG/1
650 TABLET ORAL EVERY 8 HOURS PRN
Status: DISCONTINUED | OUTPATIENT
Start: 2018-09-11 | End: 2018-09-13 | Stop reason: HOSPADM

## 2018-09-11 RX ORDER — AMLODIPINE BESYLATE 5 MG/1
5 TABLET ORAL DAILY
Status: DISCONTINUED | OUTPATIENT
Start: 2018-09-12 | End: 2018-09-13 | Stop reason: HOSPADM

## 2018-09-11 RX ORDER — AMITRIPTYLINE HYDROCHLORIDE 100 MG/1
100 TABLET ORAL NIGHTLY PRN
COMMUNITY

## 2018-09-11 RX ORDER — AMITRIPTYLINE HYDROCHLORIDE 25 MG/1
100 TABLET, FILM COATED ORAL NIGHTLY PRN
Status: DISCONTINUED | OUTPATIENT
Start: 2018-09-11 | End: 2018-09-13 | Stop reason: HOSPADM

## 2018-09-11 RX ORDER — IBUPROFEN 200 MG
24 TABLET ORAL
Status: DISCONTINUED | OUTPATIENT
Start: 2018-09-11 | End: 2018-09-13 | Stop reason: HOSPADM

## 2018-09-11 RX ORDER — IPRATROPIUM BROMIDE 0.5 MG/2.5ML
0.5 SOLUTION RESPIRATORY (INHALATION)
Status: COMPLETED | OUTPATIENT
Start: 2018-09-11 | End: 2018-09-11

## 2018-09-11 RX ORDER — ALBUTEROL SULFATE 2.5 MG/.5ML
10 SOLUTION RESPIRATORY (INHALATION)
Status: COMPLETED | OUTPATIENT
Start: 2018-09-11 | End: 2018-09-11

## 2018-09-11 RX ORDER — GLUCAGON 1 MG
1 KIT INJECTION
Status: DISCONTINUED | OUTPATIENT
Start: 2018-09-11 | End: 2018-09-13 | Stop reason: HOSPADM

## 2018-09-11 RX ORDER — DONEPEZIL HYDROCHLORIDE 5 MG/1
5 TABLET, FILM COATED ORAL NIGHTLY
Status: DISCONTINUED | OUTPATIENT
Start: 2018-09-11 | End: 2018-09-13 | Stop reason: HOSPADM

## 2018-09-11 RX ORDER — IBUPROFEN 200 MG
16 TABLET ORAL
Status: DISCONTINUED | OUTPATIENT
Start: 2018-09-11 | End: 2018-09-13 | Stop reason: HOSPADM

## 2018-09-11 RX ORDER — DULOXETIN HYDROCHLORIDE 30 MG/1
60 CAPSULE, DELAYED RELEASE ORAL DAILY
Status: DISCONTINUED | OUTPATIENT
Start: 2018-09-12 | End: 2018-09-13 | Stop reason: HOSPADM

## 2018-09-11 RX ORDER — PREGABALIN 50 MG/1
50 CAPSULE ORAL 3 TIMES DAILY
Status: DISCONTINUED | OUTPATIENT
Start: 2018-09-11 | End: 2018-09-13 | Stop reason: HOSPADM

## 2018-09-11 RX ORDER — INSULIN ASPART 100 [IU]/ML
0-5 INJECTION, SOLUTION INTRAVENOUS; SUBCUTANEOUS
Status: DISCONTINUED | OUTPATIENT
Start: 2018-09-11 | End: 2018-09-13 | Stop reason: HOSPADM

## 2018-09-11 RX ADMIN — PREGABALIN 50 MG: 50 CAPSULE ORAL at 10:09

## 2018-09-11 RX ADMIN — IPRATROPIUM BROMIDE 0.5 MG: 0.5 SOLUTION RESPIRATORY (INHALATION) at 04:09

## 2018-09-11 RX ADMIN — IPRATROPIUM BROMIDE AND ALBUTEROL SULFATE 3 ML: .5; 3 SOLUTION RESPIRATORY (INHALATION) at 08:09

## 2018-09-11 RX ADMIN — FUROSEMIDE 40 MG: 10 INJECTION, SOLUTION INTRAMUSCULAR; INTRAVENOUS at 06:09

## 2018-09-11 RX ADMIN — ENOXAPARIN SODIUM 40 MG: 100 INJECTION SUBCUTANEOUS at 10:09

## 2018-09-11 RX ADMIN — DONEPEZIL HYDROCHLORIDE 5 MG: 5 TABLET, FILM COATED ORAL at 10:09

## 2018-09-11 RX ADMIN — ALBUTEROL SULFATE 10 MG: 2.5 SOLUTION RESPIRATORY (INHALATION) at 04:09

## 2018-09-11 RX ADMIN — IPRATROPIUM BROMIDE AND ALBUTEROL SULFATE 3 ML: .5; 3 SOLUTION RESPIRATORY (INHALATION) at 11:09

## 2018-09-11 NOTE — SUBJECTIVE & OBJECTIVE
Past Medical History:   Diagnosis Date    Arthritis     CKD (chronic kidney disease)     Dementia     Depression     DM2 (diabetes mellitus, type 2)     GERD (gastroesophageal reflux disease)     HLD (hyperlipidemia)     HTN (hypertension)     HIWOT on CPAP     Streptococcal pneumonia 1/27/2017       Past Surgical History:   Procedure Laterality Date    HYSTERECTOMY         Review of patient's allergies indicates:  No Known Allergies    No current facility-administered medications on file prior to encounter.      Current Outpatient Medications on File Prior to Encounter   Medication Sig    albuterol (PROVENTIL/VENTOLIN) 90 mcg/actuation inhaler Inhale 2 puffs into the lungs every 6 (six) hours as needed for Wheezing.    amitriptyline (ELAVIL) 100 MG tablet Take 100 mg by mouth nightly as needed for Insomnia.    amlodipine (NORVASC) 5 MG tablet Take 1 tablet (5 mg total) by mouth once daily.    donepezil (ARICEPT) 5 MG tablet Take 5 mg by mouth every evening.    duloxetine (CYMBALTA) 60 MG capsule Take 1 capsule (60 mg total) by mouth once daily.    fish oil-omega-3 fatty acids 300-1,000 mg capsule Take 1 g by mouth 2 (two) times daily.     fluticasone-salmeterol 100-50 mcg/dose (ADVAIR) 100-50 mcg/dose diskus inhaler Inhale 1 puff into the lungs 2 (two) times daily.    glipiZIDE (GLUCOTROL) 5 MG tablet Take 0.5 tablets (2.5 mg total) by mouth daily with breakfast. (Patient taking differently: Take 5 mg by mouth daily with breakfast. )    pentoxifylline (TRENTAL) 400 mg TbSR Take 400 mg by mouth 2 (two) times daily with meals.    pregabalin (LYRICA) 50 MG capsule Take 1 capsule (50 mg total) by mouth 2 (two) times daily. (Patient taking differently: Take 50 mg by mouth 3 (three) times daily. )    atorvastatin (LIPITOR) 40 MG tablet Take 1 tablet (40 mg total) by mouth once daily.    multivitamin (THERAGRAN) per tablet Take 1 tablet by mouth once daily.    nystatin-triamcinolone (MYCOLOG)  ointment Apply topically 2 (two) times daily. To areas of redness    omega-3 acid ethyl esters (LOVAZA) 1 gram capsule Take 2 g by mouth 2 (two) times daily.     Family History     None        Tobacco Use    Smoking status: Never Smoker    Smokeless tobacco: Never Used   Substance and Sexual Activity    Alcohol use: No    Drug use: No    Sexual activity: Not on file     Review of Systems   Constitutional: Negative for activity change, appetite change, chills, diaphoresis, fever and unexpected weight change.   HENT: Negative for facial swelling, sinus pressure, sinus pain and sore throat.    Eyes: Negative for photophobia and visual disturbance.   Respiratory: Positive for cough, shortness of breath and wheezing. Negative for chest tightness.    Cardiovascular: Positive for leg swelling. Negative for chest pain and palpitations.   Gastrointestinal: Negative for abdominal distention, abdominal pain, blood in stool, constipation, diarrhea, nausea and vomiting.   Endocrine: Negative for cold intolerance, heat intolerance, polydipsia, polyphagia and polyuria.   Genitourinary: Negative for difficulty urinating, dysuria, flank pain, hematuria and urgency.   Musculoskeletal: Positive for gait problem. Negative for arthralgias, back pain, myalgias, neck pain and neck stiffness.   Skin: Positive for rash. Negative for color change and pallor.   Allergic/Immunologic: Negative for environmental allergies, food allergies and immunocompromised state.   Neurological: Negative for dizziness, seizures, syncope, weakness, light-headedness and headaches.   Hematological: Negative for adenopathy. Does not bruise/bleed easily.   Psychiatric/Behavioral: Negative for behavioral problems, confusion, hallucinations and sleep disturbance. The patient is not nervous/anxious.    All other systems reviewed and are negative.    Objective:     Vital Signs (Most Recent):  Temp: 97.9 °F (36.6 °C) (09/11/18 1427)  Pulse: 86 (09/11/18  1730)  Resp: 18 (09/11/18 1730)  BP: (!) 153/64 (09/11/18 1730)  SpO2: 100 % (09/11/18 1730) Vital Signs (24h Range):  Temp:  [97.9 °F (36.6 °C)] 97.9 °F (36.6 °C)  Pulse:  [74-86] 86  Resp:  [16-19] 18  SpO2:  [93 %-100 %] 100 %  BP: (146-157)/(61-73) 153/64     Weight: (!) 158.8 kg (350 lb)  Body mass index is 56.49 kg/m².    Physical Exam   Constitutional: She is oriented to person, place, and time. She appears well-developed and well-nourished. She has a sickly appearance. No distress. Face mask in place.   HENT:   Head: Normocephalic and atraumatic.   Right Ear: External ear normal.   Left Ear: External ear normal.   Nose: Nose normal.   Eyes: Conjunctivae and EOM are normal. Pupils are equal, round, and reactive to light.   Neck: Normal range of motion. Neck supple.   Cardiovascular: Normal rate, regular rhythm and intact distal pulses.   Cardiac exam difficult to assess 2/2 morbid obesity.   Pulmonary/Chest: Effort normal. She has wheezes.   Abdominal: Soft. Bowel sounds are normal. There is no tenderness. There is no guarding.   Musculoskeletal: She exhibits edema (1+ bilateral LE). She exhibits no tenderness.   Neurological: She is alert and oriented to person, place, and time. No cranial nerve deficit. GCS eye subscore is 4. GCS verbal subscore is 5. GCS motor subscore is 6.   Skin: Skin is warm and dry. She is not diaphoretic. There is erythema (Chronic bilateral LE dermatitis. No warmth or induration.).   Psychiatric: She has a normal mood and affect. Her behavior is normal. Judgment and thought content normal.   Nursing note and vitals reviewed.        CRANIAL NERVES     CN III, IV, VI   Pupils are equal, round, and reactive to light.  Extraocular motions are normal.        Significant Labs: All pertinent labs within the past 24 hours have been reviewed.    Significant Imaging: I have reviewed all pertinent imaging results/findings within the past 24 hours.     X-ray Chest Ap Portable    Result Date:  9/11/2018  EXAMINATION: XR CHEST AP PORTABLE CLINICAL HISTORY: dyspnea; TECHNIQUE: Single frontal view of the chest was performed. COMPARISON: 09/13/2017 FINDINGS: There is enlargement of the cardiac silhouette representing cardiomegaly.  Mediastinum is unremarkable.  There is no tracheal abnormality. There is central vascular congestion with slight prominence of the interstitial markings in the lower lung zones bilaterally, suggestive of findings of early congestive heart failure.  There are multiple right hilar calcified nodes unchanged from the previous study.  There are cardiac monitoring leads over the chest.  The visualized bony thorax is grossly unremarkable.     1. Cardiomegaly. 2. Pulmonary vascular congestion with mild prominence of the interstitial markings possibly from CHF. 3. Findings suggestive of calcified right hilar nodes.    Electronically signed by: Rubin Laird MD Date: 09/11/2018 Time: 15:16

## 2018-09-11 NOTE — HPI
"Ms. Jessica is a 78 y/o  female with a history of morbid obesity, HIWOT, hypertension, DM type II without current use of insulin, chronic bilateral lower extremity dermatitis, dementia, anemia, depression, GERD, home oxygen dependence, and chronic kidney disease who presents to the [facility] ED with complaint of worsening shortness of breath and leg swelling for the past 2-3 days. She also admits to productive cough with green sputum. She states that she normally uses 3 L O2 via nasal cannula and inhalers at home which did not provide relief. She denies any exacerbating or alleviating factors. She states that she has taken all of her regular medications. Pt states that she has a CPAP machine at home but does not use it because it "blows too much air in my face." She denies fever, chills, headaches, dizziness, chest pain, nausea, vomiting, abdominal pain, changes in urinary or bowel habits. In the ED she was found to be hypoxic with documented O2 sat at 93%. Chest XR showed cardiomegaly; pulmonary vascular congestion with mild prominence of the interstitial markings possibly from CHF; and findings suggestive of calcified right hilar nodes. WBC count normal, UA negative, BNP 26, troponin negative. She was given nebulizer treatments with improvement in SpO2. She was admitted to [facility].    She lives with her sister in [facility]. She uses a wheelchair to ambulate and is mostly bed bound. Unsure if patient is an accurate historian given known dementia. She denies tobacco, alcohol, or illicit drug use. Her PCP is NP Cindy Spencer.         "

## 2018-09-11 NOTE — ASSESSMENT & PLAN NOTE
Pt refuses to see outpatient nephrology. Cr at 1.0. Continue to monitor. Avoid nephrotoxins and renally dose meds.

## 2018-09-11 NOTE — ED NOTES
Pt here c/o uses oxygen at home 3LncO2 and inhalers and having worsening SOB. Pt is AAOx3,clear speech. resp appear unlabored and regular. Breath sounds are diminished. Lower ext with mild to moderate with redness no open wounds observed. Pt denies fevers at home. And denies changes in her lower ext.

## 2018-09-11 NOTE — ASSESSMENT & PLAN NOTE
Physical deconditioning  Pt would benefit from weight loss. Counseled on importance of diet and weight loss. She uses a ?walker/?wheelchair at home and is mostly bedbound. Unsure of ADLs status. Consult PT/OT.

## 2018-09-11 NOTE — ASSESSMENT & PLAN NOTE
Diabetic neuropathy  Pt takes glipizide 5 mg daily. Will hold during hospital admission. Check A1c. Monitor BG and titrate with SSI. Continue home lyrica. Diabetic diet.

## 2018-09-11 NOTE — ED PROVIDER NOTES
Encounter Date: 9/11/2018       History     Chief Complaint   Patient presents with    Shortness of Breath     x 2 days, admits to productive cough with white to green sputum, denies fever. FP NP called stating patient is in early RF but refuses to see nephrologist      Adela Jessica is a 78 y.o. female who  has a past medical history of Arthritis, CKD (chronic kidney disease), Dementia, Depression, DM2 (diabetes mellitus, type 2), GERD (gastroesophageal reflux disease), HLD (hyperlipidemia), HTN (hypertension), HIWOT on CPAP, and Streptococcal pneumonia (1/27/2017).    Patient brought in by ambulance for 3 days of worsening leg swelling as well as shortness of breath she denies chest pain nausea vomiting or fever.  Patient sleeps in a recliner chair normally uses 3 L nasal cannula has had to use more for the past couple of days.  This is exacerbated by movement.  She does not report compliance with her medication regimen          Review of patient's allergies indicates:  No Known Allergies  Past Medical History:   Diagnosis Date    Arthritis     CKD (chronic kidney disease)     Dementia     Depression     DM2 (diabetes mellitus, type 2)     GERD (gastroesophageal reflux disease)     HLD (hyperlipidemia)     HTN (hypertension)     HIWOT on CPAP     Streptococcal pneumonia 1/27/2017     Past Surgical History:   Procedure Laterality Date    HYSTERECTOMY       No family history on file.  Social History     Tobacco Use    Smoking status: Never Smoker    Smokeless tobacco: Never Used   Substance Use Topics    Alcohol use: No    Drug use: No     Review of Systems   Constitutional: Negative for chills and fever.   HENT: Negative for rhinorrhea, sore throat and trouble swallowing.    Eyes: Negative for visual disturbance.   Respiratory: Negative for cough and shortness of breath.    Cardiovascular: Negative for chest pain.   Gastrointestinal: Negative for abdominal pain, diarrhea and vomiting.   Genitourinary:  Negative for dysuria and hematuria.   Musculoskeletal: Negative for back pain.   Skin: Negative for rash.   Neurological: Negative for numbness and headaches.   Hematological: Negative for adenopathy.       Physical Exam     Initial Vitals [09/11/18 1427]   BP Pulse Resp Temp SpO2   (!) 156/72 74 18 97.9 °F (36.6 °C) 98 %      MAP       --         Physical Exam    Constitutional:  Non-toxic appearance. No distress.   Ill-appearing   HENT:   Head: Normocephalic and atraumatic.   Eyes: Conjunctivae and EOM are normal. Pupils are equal, round, and reactive to light. Right eye exhibits no nystagmus. Left eye exhibits no nystagmus.   Neck: Neck supple.   Cardiovascular: Regular rhythm, S1 normal and S2 normal.   No murmur heard.  Pulmonary/Chest: No respiratory distress. She has no wheezes. She has rales.   Abdominal: Soft. She exhibits no distension. There is no tenderness.   Neurological: She is alert. No cranial nerve deficit. GCS eye subscore is 4. GCS verbal subscore is 5. GCS motor subscore is 6.   Skin: Skin is warm. No rash noted.   Bilateral pitting pretibial edema erythema without induration or warmth consistent with stasis   Psychiatric: She has a normal mood and affect. Her behavior is normal.         ED Course   Procedures  Labs Reviewed   CBC W/ AUTO DIFFERENTIAL - Abnormal; Notable for the following components:       Result Value    RBC 3.26 (*)     Hemoglobin 9.7 (*)     Hematocrit 33.7 (*)      (*)     MCHC 28.8 (*)     MPV 8.4 (*)     Lymph # 0.9 (*)     Gran% 78.7 (*)     Lymph% 14.4 (*)     All other components within normal limits   COMPREHENSIVE METABOLIC PANEL - Abnormal; Notable for the following components:    CO2 39 (*)     Albumin 2.9 (*)     eGFR if non  54 (*)     All other components within normal limits   URINALYSIS   TROPONIN I   B-TYPE NATRIURETIC PEPTIDE          Imaging Results          X-Ray Chest AP Portable (Final result)  Result time 09/11/18 15:16:03    Final  result by Rubin Laird MD (09/11/18 15:16:03)                 Impression:      1. Cardiomegaly.  2. Pulmonary vascular congestion with mild prominence of the interstitial markings possibly from CHF.  3. Findings suggestive of calcified right hilar nodes.      Electronically signed by: Rubin Laird MD  Date:    09/11/2018  Time:    15:16             Narrative:    EXAMINATION:  XR CHEST AP PORTABLE    CLINICAL HISTORY:  dyspnea;    TECHNIQUE:  Single frontal view of the chest was performed.    COMPARISON:  09/13/2017    FINDINGS:  There is enlargement of the cardiac silhouette representing cardiomegaly.  Mediastinum is unremarkable.  There is no tracheal abnormality.    There is central vascular congestion with slight prominence of the interstitial markings in the lower lung zones bilaterally, suggestive of findings of early congestive heart failure.  There are multiple right hilar calcified nodes unchanged from the previous study.  There are cardiac monitoring leads over the chest.  The visualized bony thorax is grossly unremarkable.                                 Medical Decision Making:   Initial Assessment:   Patient with increased oxygen demand in the setting of history of COPD CHF will obtain labs EKG/troponin to evaluate for ACS suspect admission  Differential Diagnosis:   Differential Diagnosis includes, but is not limited to:  ACS/MI, PE, aortic dissection, pneumothorax, cardiac tamponade, pericarditis/myocarditis, pneumonia, infection/abscess, lung mass, trauma/fracture, costochondritis/pleurisy, MSK pain/contusion, GERD, biliary disease, pancreatitis, anemia    ED Management:  Patient with increased oxygen demand and orthopnea. ECG/ Trop negative patient presently stable suspect dyspnea secondary to multiple factors including her obesity COPD and pulmonary congestion on chest x-ray patient to be admitted to Internal Medicine                   ED Course as of Sep 13 0232   Tue Sep 11, 2018   1523 X-Ray  Chest AP Portable [RN]   1547 EKG:  Rate 82.  Interval 204 normal sinus rhythm left anterior fascicular block Q-wave in aVL  [RN]      ED Course User Index  [RN] Alonzo Henriquez Jr., MD     Clinical Impression:   Diagnoses of Dyspnea and CHF (congestive heart failure) were pertinent to this visit.                             Alonzo Henriquez Jr., MD  09/13/18 1825

## 2018-09-11 NOTE — ASSESSMENT & PLAN NOTE
Chronic hypercapnia  Obstructive sleep apnea  Supplemental oxygen dependent  Pt presents with 2-3 days of worsening SOB, leg swelling, and productive cough. Pt on 3 L O2 via NC at home. States she does not use her CPAP machine as recommended.    - ?centrilobular emphysema noted in chart. Pt denies history of smoking.  - WBC count normal. Afebrile. CO2 chronically elevated; appears to be at baseline at 39.  - Chest XR showed cardiomegaly; pulmonary vascular congestion with mild prominence of the interstitial markings possibly from CHF; findings suggestive of calcified right hilar nodes.  - DuoNebs, monitor O2 sats, continue supplemental oxygen and wean as tolerated.  - BNP normal but pt seems wet on exam. Give lasix 40 mg IV. Monitor I&Os. Will order echo.

## 2018-09-12 PROBLEM — I50.30 HEART FAILURE WITH PRESERVED EJECTION FRACTION: Status: ACTIVE | Noted: 2018-09-12

## 2018-09-12 LAB
ANION GAP SERPL CALC-SCNC: 8 MMOL/L
BASOPHILS # BLD AUTO: 0.01 K/UL
BASOPHILS NFR BLD: 0.2 %
BUN SERPL-MCNC: 12 MG/DL
CALCIUM SERPL-MCNC: 9.3 MG/DL
CHLORIDE SERPL-SCNC: 91 MMOL/L
CO2 SERPL-SCNC: 46 MMOL/L
CREAT SERPL-MCNC: 0.9 MG/DL
DIASTOLIC DYSFUNCTION: NO
DIFFERENTIAL METHOD: ABNORMAL
EOSINOPHIL # BLD AUTO: 0.2 K/UL
EOSINOPHIL NFR BLD: 2.4 %
ERYTHROCYTE [DISTWIDTH] IN BLOOD BY AUTOMATED COUNT: 14.1 %
EST. GFR  (AFRICAN AMERICAN): >60 ML/MIN/1.73 M^2
EST. GFR  (NON AFRICAN AMERICAN): >60 ML/MIN/1.73 M^2
ESTIMATED PA SYSTOLIC PRESSURE: 24.32
GLUCOSE SERPL-MCNC: 100 MG/DL
HCT VFR BLD AUTO: 31 %
HGB BLD-MCNC: 9.2 G/DL
LYMPHOCYTES # BLD AUTO: 1.5 K/UL
LYMPHOCYTES NFR BLD: 22.4 %
MAGNESIUM SERPL-MCNC: 1.5 MG/DL
MCH RBC QN AUTO: 30.4 PG
MCHC RBC AUTO-ENTMCNC: 29.7 G/DL
MCV RBC AUTO: 102 FL
MITRAL VALVE MOBILITY: NORMAL
MONOCYTES # BLD AUTO: 0.5 K/UL
MONOCYTES NFR BLD: 6.9 %
NEUTROPHILS # BLD AUTO: 4.5 K/UL
NEUTROPHILS NFR BLD: 67.8 %
PLATELET # BLD AUTO: 247 K/UL
PMV BLD AUTO: 8.5 FL
POCT GLUCOSE: 116 MG/DL (ref 70–110)
POCT GLUCOSE: 156 MG/DL (ref 70–110)
POCT GLUCOSE: 86 MG/DL (ref 70–110)
POCT GLUCOSE: 98 MG/DL (ref 70–110)
POTASSIUM SERPL-SCNC: 3.4 MMOL/L
RBC # BLD AUTO: 3.03 M/UL
RETIRED EF AND QEF - SEE NOTES: 55 (ref 55–65)
SODIUM SERPL-SCNC: 145 MMOL/L
TRICUSPID VALVE REGURGITATION: NORMAL
WBC # BLD AUTO: 6.65 K/UL

## 2018-09-12 PROCEDURE — 97165 OT EVAL LOW COMPLEX 30 MIN: CPT

## 2018-09-12 PROCEDURE — 25000242 PHARM REV CODE 250 ALT 637 W/ HCPCS: Performed by: PHYSICIAN ASSISTANT

## 2018-09-12 PROCEDURE — G8978 MOBILITY CURRENT STATUS: HCPCS | Mod: CK

## 2018-09-12 PROCEDURE — 80048 BASIC METABOLIC PNL TOTAL CA: CPT

## 2018-09-12 PROCEDURE — 25000003 PHARM REV CODE 250: Performed by: HOSPITALIST

## 2018-09-12 PROCEDURE — 27000221 HC OXYGEN, UP TO 24 HOURS

## 2018-09-12 PROCEDURE — 11000001 HC ACUTE MED/SURG PRIVATE ROOM

## 2018-09-12 PROCEDURE — 94761 N-INVAS EAR/PLS OXIMETRY MLT: CPT

## 2018-09-12 PROCEDURE — G8979 MOBILITY GOAL STATUS: HCPCS | Mod: CH

## 2018-09-12 PROCEDURE — 93306 TTE W/DOPPLER COMPLETE: CPT | Mod: 26,,, | Performed by: INTERNAL MEDICINE

## 2018-09-12 PROCEDURE — 85025 COMPLETE CBC W/AUTO DIFF WBC: CPT

## 2018-09-12 PROCEDURE — 99900035 HC TECH TIME PER 15 MIN (STAT)

## 2018-09-12 PROCEDURE — 25000003 PHARM REV CODE 250: Performed by: PHYSICIAN ASSISTANT

## 2018-09-12 PROCEDURE — 36415 COLL VENOUS BLD VENIPUNCTURE: CPT

## 2018-09-12 PROCEDURE — 94640 AIRWAY INHALATION TREATMENT: CPT

## 2018-09-12 PROCEDURE — 93306 TTE W/DOPPLER COMPLETE: CPT

## 2018-09-12 PROCEDURE — 97535 SELF CARE MNGMENT TRAINING: CPT

## 2018-09-12 PROCEDURE — 97161 PT EVAL LOW COMPLEX 20 MIN: CPT

## 2018-09-12 PROCEDURE — 63600175 PHARM REV CODE 636 W HCPCS: Performed by: PHYSICIAN ASSISTANT

## 2018-09-12 PROCEDURE — 83735 ASSAY OF MAGNESIUM: CPT

## 2018-09-12 RX ORDER — LANOLIN ALCOHOL/MO/W.PET/CERES
400 CREAM (GRAM) TOPICAL 2 TIMES DAILY
Status: DISCONTINUED | OUTPATIENT
Start: 2018-09-12 | End: 2018-09-13

## 2018-09-12 RX ORDER — LANOLIN ALCOHOL/MO/W.PET/CERES
400 CREAM (GRAM) TOPICAL 2 TIMES DAILY
Status: DISCONTINUED | OUTPATIENT
Start: 2018-09-12 | End: 2018-09-12

## 2018-09-12 RX ORDER — HYDRALAZINE HYDROCHLORIDE 25 MG/1
50 TABLET, FILM COATED ORAL EVERY 8 HOURS PRN
Status: DISCONTINUED | OUTPATIENT
Start: 2018-09-12 | End: 2018-09-13 | Stop reason: HOSPADM

## 2018-09-12 RX ORDER — POTASSIUM CHLORIDE 20 MEQ/1
40 TABLET, EXTENDED RELEASE ORAL ONCE
Status: COMPLETED | OUTPATIENT
Start: 2018-09-12 | End: 2018-09-12

## 2018-09-12 RX ORDER — FUROSEMIDE 10 MG/ML
20 INJECTION INTRAMUSCULAR; INTRAVENOUS ONCE
Status: COMPLETED | OUTPATIENT
Start: 2018-09-12 | End: 2018-09-12

## 2018-09-12 RX ADMIN — PENTOXIFYLLINE 400 MG: 400 TABLET, FILM COATED, EXTENDED RELEASE ORAL at 06:09

## 2018-09-12 RX ADMIN — FUROSEMIDE 20 MG: 10 INJECTION, SOLUTION INTRAMUSCULAR; INTRAVENOUS at 03:09

## 2018-09-12 RX ADMIN — MAGNESIUM OXIDE TAB 400 MG (241.3 MG ELEMENTAL MG) 400 MG: 400 (241.3 MG) TAB at 08:09

## 2018-09-12 RX ADMIN — DONEPEZIL HYDROCHLORIDE 5 MG: 5 TABLET, FILM COATED ORAL at 08:09

## 2018-09-12 RX ADMIN — PENTOXIFYLLINE 400 MG: 400 TABLET, FILM COATED, EXTENDED RELEASE ORAL at 12:09

## 2018-09-12 RX ADMIN — DULOXETINE 60 MG: 30 CAPSULE, DELAYED RELEASE ORAL at 11:09

## 2018-09-12 RX ADMIN — PREGABALIN 50 MG: 50 CAPSULE ORAL at 08:09

## 2018-09-12 RX ADMIN — IPRATROPIUM BROMIDE AND ALBUTEROL SULFATE 3 ML: .5; 3 SOLUTION RESPIRATORY (INHALATION) at 08:09

## 2018-09-12 RX ADMIN — ENOXAPARIN SODIUM 40 MG: 100 INJECTION SUBCUTANEOUS at 09:09

## 2018-09-12 RX ADMIN — IPRATROPIUM BROMIDE AND ALBUTEROL SULFATE 3 ML: .5; 3 SOLUTION RESPIRATORY (INHALATION) at 11:09

## 2018-09-12 RX ADMIN — POTASSIUM CHLORIDE 40 MEQ: 20 TABLET, EXTENDED RELEASE ORAL at 03:09

## 2018-09-12 RX ADMIN — PREGABALIN 50 MG: 50 CAPSULE ORAL at 11:09

## 2018-09-12 RX ADMIN — POTASSIUM CHLORIDE 40 MEQ: 1500 TABLET, EXTENDED RELEASE ORAL at 11:09

## 2018-09-12 RX ADMIN — IPRATROPIUM BROMIDE AND ALBUTEROL SULFATE 3 ML: .5; 3 SOLUTION RESPIRATORY (INHALATION) at 04:09

## 2018-09-12 RX ADMIN — IPRATROPIUM BROMIDE AND ALBUTEROL SULFATE 3 ML: .5; 3 SOLUTION RESPIRATORY (INHALATION) at 03:09

## 2018-09-12 RX ADMIN — PREGABALIN 50 MG: 50 CAPSULE ORAL at 03:09

## 2018-09-12 RX ADMIN — AMLODIPINE BESYLATE 5 MG: 5 TABLET ORAL at 11:09

## 2018-09-12 NOTE — SUBJECTIVE & OBJECTIVE
Interval History: Pt seen and examined at bedside. Her sister is present. She states her SOB has improved some. Currently on 2 L O2 via NC. She denies any acute complaints.    Review of Systems   Constitutional: Positive for fatigue. Negative for appetite change, chills and fever.   Respiratory: Positive for cough and shortness of breath. Negative for chest tightness and wheezing.    Cardiovascular: Positive for leg swelling. Negative for chest pain.   Gastrointestinal: Negative for abdominal pain, constipation, diarrhea, nausea and vomiting.   Endocrine: Negative for cold intolerance, heat intolerance, polydipsia, polyphagia and polyuria.   Genitourinary: Negative for difficulty urinating, dysuria, flank pain, hematuria and urgency.   Musculoskeletal: Positive for gait problem. Negative for arthralgias, myalgias, neck pain and neck stiffness.   Skin: Positive for rash.   Neurological: Negative for dizziness, syncope, light-headedness and headaches.   Psychiatric/Behavioral: Negative for agitation, confusion and sleep disturbance.     Objective:     Vital Signs (Most Recent):  Temp: 99 °F (37.2 °C) (09/12/18 1124)  Pulse: 86 (09/12/18 1124)  Resp: 18 (09/12/18 1124)  BP: 125/61 (09/12/18 1124)  SpO2: (!) 93 % (09/12/18 1109) Vital Signs (24h Range):  Temp:  [97.4 °F (36.3 °C)-99.4 °F (37.4 °C)] 99 °F (37.2 °C)  Pulse:  [82-93] 86  Resp:  [16-22] 18  SpO2:  [91 %-100 %] 93 %  BP: (125-179)/(61-77) 125/61     Weight: (!) 152.3 kg (335 lb 12.2 oz)  Body mass index is 54.19 kg/m².    Intake/Output Summary (Last 24 hours) at 9/12/2018 1520  Last data filed at 9/12/2018 1230  Gross per 24 hour   Intake 225 ml   Output 5700 ml   Net -5475 ml      Physical Exam   Constitutional: She is oriented to person, place, and time. She appears well-developed and well-nourished. No distress.   Morbidly obese.   HENT:   Head: Normocephalic and atraumatic.   Eyes: Conjunctivae and EOM are normal. Pupils are equal, round, and reactive to  light.   Neck: Normal range of motion. Neck supple.   Cardiovascular: Normal rate, regular rhythm, normal heart sounds and intact distal pulses.   Pulmonary/Chest: Effort normal and breath sounds normal. No respiratory distress. She has no wheezes.   Abdominal: Soft. Bowel sounds are normal. There is no tenderness.   Musculoskeletal: She exhibits edema (1+ B/l LE ). She exhibits no tenderness.   B/l LE weakness   Neurological: She is alert and oriented to person, place, and time. She is not disoriented. GCS eye subscore is 4. GCS verbal subscore is 5. GCS motor subscore is 6.   Skin: Skin is warm and dry. She is not diaphoretic. There is erythema (Chronic bilateral LE dermatitis. No warmth or induration).   Psychiatric: She has a normal mood and affect. Her behavior is normal. Judgment and thought content normal.   Nursing note and vitals reviewed.      Significant Labs: All pertinent labs within the past 24 hours have been reviewed.    Significant Imaging: I have reviewed all pertinent imaging results/findings within the past 24 hours.

## 2018-09-12 NOTE — H&P
"[facility]  History & Physical    Patient Name: Adela Jessica  MRN: [id number]  Admission Date: 9/11/2018  Attending Physician: Alonzo Henriquez Jr., MD   Primary Care Provider: Cindy Spencer NP         Patient information was obtained from patient, past medical records and ER records.     Subjective:     Principal Problem:Acute on chronic respiratory failure with hypoxia    Chief Complaint:   Chief Complaint   Patient presents with    Shortness of Breath     x 2 days, admits to productive cough with white to green sputum, denies fever. FP NP called stating patient is in early RF but refuses to see nephrologist         HPI: Ms. Jessica is a 76 y/o  female with a history of morbid obesity, HIWOT, hypertension, DM type II without current use of insulin, chronic bilateral lower extremity dermatitis, dementia, depression, GERD, home oxygen dependence, and chronic kidney disease who presents to the [facility] ED with complaint of worsening shortness of breath and leg swelling for the past 2-3 days. She also admits to productive cough with green sputum. She states that she normally uses 3 L O2 via nasal cannula and inhalers at home which did not provide relief. She denies any exacerbating or alleviating factors. She states that she has taken all of her regular medications. Pt states that she has a CPAP machine at home but does not use it because it "blows too much air in my face." She denies fever, chills, headaches, dizziness, chest pain, nausea, vomiting, abdominal pain, changes in urinary or bowel habits. In the ED she was found to be hypoxic with documented O2 sat at 93%. Chest XR showed cardiomegaly; pulmonary vascular congestion with mild prominence of the interstitial markings possibly from CHF; and findings suggestive of calcified right hilar nodes. WBC count normal, UA negative, BNP 26, troponin negative. She was given nebulizer treatments with improvement in SpO2. She was " admitted to [facility].    She lives with her sister in [facility]. She uses a wheelchair to ambulate and is mostly bed bound. Unsure if patient is an accurate historian given known dementia. She denies tobacco, alcohol, or illicit drug use. Her PCP is Dr. Cindy Spencer.           Past Medical History:   Diagnosis Date    Arthritis     CKD (chronic kidney disease)     Dementia     Depression     DM2 (diabetes mellitus, type 2)     GERD (gastroesophageal reflux disease)     HLD (hyperlipidemia)     HTN (hypertension)     HIWOT on CPAP     Streptococcal pneumonia 1/27/2017       Past Surgical History:   Procedure Laterality Date    HYSTERECTOMY         Review of patient's allergies indicates:  No Known Allergies    No current facility-administered medications on file prior to encounter.      Current Outpatient Medications on File Prior to Encounter   Medication Sig    albuterol (PROVENTIL/VENTOLIN) 90 mcg/actuation inhaler Inhale 2 puffs into the lungs every 6 (six) hours as needed for Wheezing.    amitriptyline (ELAVIL) 100 MG tablet Take 100 mg by mouth nightly as needed for Insomnia.    amlodipine (NORVASC) 5 MG tablet Take 1 tablet (5 mg total) by mouth once daily.    donepezil (ARICEPT) 5 MG tablet Take 5 mg by mouth every evening.    duloxetine (CYMBALTA) 60 MG capsule Take 1 capsule (60 mg total) by mouth once daily.    fish oil-omega-3 fatty acids 300-1,000 mg capsule Take 1 g by mouth 2 (two) times daily.     fluticasone-salmeterol 100-50 mcg/dose (ADVAIR) 100-50 mcg/dose diskus inhaler Inhale 1 puff into the lungs 2 (two) times daily.    glipiZIDE (GLUCOTROL) 5 MG tablet Take 0.5 tablets (2.5 mg total) by mouth daily with breakfast. (Patient taking differently: Take 5 mg by mouth daily with breakfast. )    pentoxifylline (TRENTAL) 400 mg TbSR Take 400 mg by mouth 2 (two) times daily with meals.    pregabalin (LYRICA) 50 MG capsule Take 1 capsule (50 mg total) by mouth 2 (two)  times daily. (Patient taking differently: Take 50 mg by mouth 3 (three) times daily. )    atorvastatin (LIPITOR) 40 MG tablet Take 1 tablet (40 mg total) by mouth once daily.    multivitamin (THERAGRAN) per tablet Take 1 tablet by mouth once daily.    nystatin-triamcinolone (MYCOLOG) ointment Apply topically 2 (two) times daily. To areas of redness    omega-3 acid ethyl esters (LOVAZA) 1 gram capsule Take 2 g by mouth 2 (two) times daily.     Family History     None        Tobacco Use    Smoking status: Never Smoker    Smokeless tobacco: Never Used   Substance and Sexual Activity    Alcohol use: No    Drug use: No    Sexual activity: Not on file     Review of Systems   Constitutional: Negative for activity change, appetite change, chills, diaphoresis, fever and unexpected weight change.   HENT: Negative for facial swelling, sinus pressure, sinus pain and sore throat.    Eyes: Negative for photophobia and visual disturbance.   Respiratory: Positive for cough, shortness of breath and wheezing. Negative for chest tightness.    Cardiovascular: Positive for leg swelling. Negative for chest pain and palpitations.   Gastrointestinal: Negative for abdominal distention, abdominal pain, blood in stool, constipation, diarrhea, nausea and vomiting.   Endocrine: Negative for cold intolerance, heat intolerance, polydipsia, polyphagia and polyuria.   Genitourinary: Negative for difficulty urinating, dysuria, flank pain, hematuria and urgency.   Musculoskeletal: Positive for gait problem. Negative for arthralgias, back pain, myalgias, neck pain and neck stiffness.   Skin: Positive for rash. Negative for color change and pallor.   Allergic/Immunologic: Negative for environmental allergies, food allergies and immunocompromised state.   Neurological: Negative for dizziness, seizures, syncope, weakness, light-headedness and headaches.   Hematological: Negative for adenopathy. Does not bruise/bleed easily.    Psychiatric/Behavioral: Negative for behavioral problems, confusion, hallucinations and sleep disturbance. The patient is not nervous/anxious.    All other systems reviewed and are negative.    Objective:     Vital Signs (Most Recent):  Temp: 97.9 °F (36.6 °C) (09/11/18 1427)  Pulse: 86 (09/11/18 1730)  Resp: 18 (09/11/18 1730)  BP: (!) 153/64 (09/11/18 1730)  SpO2: 100 % (09/11/18 1730) Vital Signs (24h Range):  Temp:  [97.9 °F (36.6 °C)] 97.9 °F (36.6 °C)  Pulse:  [74-86] 86  Resp:  [16-19] 18  SpO2:  [93 %-100 %] 100 %  BP: (146-157)/(61-73) 153/64     Weight: (!) 158.8 kg (350 lb)  Body mass index is 56.49 kg/m².    Physical Exam   Constitutional: She is oriented to person, place, and time. She appears well-developed and well-nourished. She has a sickly appearance. No distress. Face mask in place.   HENT:   Head: Normocephalic and atraumatic.   Right Ear: External ear normal.   Left Ear: External ear normal.   Nose: Nose normal.   Eyes: Conjunctivae and EOM are normal. Pupils are equal, round, and reactive to light.   Neck: Normal range of motion. Neck supple.   Cardiovascular: Normal rate, regular rhythm and intact distal pulses.   Cardiac exam difficult to assess 2/2 morbid obesity.   Pulmonary/Chest: Effort normal. She has wheezes.   Abdominal: Soft. Bowel sounds are normal. There is no tenderness. There is no guarding.   Musculoskeletal: She exhibits edema (1+ bilateral LE). She exhibits no tenderness.   Neurological: She is alert and oriented to person, place, and time. No cranial nerve deficit. GCS eye subscore is 4. GCS verbal subscore is 5. GCS motor subscore is 6.   Skin: Skin is warm and dry. She is not diaphoretic. There is erythema (Chronic bilateral LE dermatitis. No warmth or induration.).   Psychiatric: She has a normal mood and affect. Her behavior is normal. Judgment and thought content normal.   Nursing note and vitals reviewed.        CRANIAL NERVES     CN III, IV, VI   Pupils are equal,  round, and reactive to light.  Extraocular motions are normal.        Significant Labs: All pertinent labs within the past 24 hours have been reviewed.    Significant Imaging: I have reviewed all pertinent imaging results/findings within the past 24 hours.     X-ray Chest Ap Portable    Result Date: 9/11/2018  EXAMINATION: XR CHEST AP PORTABLE CLINICAL HISTORY: dyspnea; TECHNIQUE: Single frontal view of the chest was performed. COMPARISON: 09/13/2017 FINDINGS: There is enlargement of the cardiac silhouette representing cardiomegaly.  Mediastinum is unremarkable.  There is no tracheal abnormality. There is central vascular congestion with slight prominence of the interstitial markings in the lower lung zones bilaterally, suggestive of findings of early congestive heart failure.  There are multiple right hilar calcified nodes unchanged from the previous study.  There are cardiac monitoring leads over the chest.  The visualized bony thorax is grossly unremarkable.     1. Cardiomegaly. 2. Pulmonary vascular congestion with mild prominence of the interstitial markings possibly from CHF. 3. Findings suggestive of calcified right hilar nodes.    Electronically signed by: Rubin Laird MD Date: 09/11/2018 Time: 15:16      Assessment/Plan:     * Acute on chronic respiratory failure with hypoxia    Chronic hypercapnia  Obstructive sleep apnea  Supplemental oxygen dependent  Pt presents with 2-3 days of worsening SOB, leg swelling, and productive cough. Pt on 3 L O2 via NC at home. States she does not use her CPAP machine as recommended.    - ?centrilobular emphysema noted in chart. Pt denies history of smoking.  - WBC count normal. Afebrile. CO2 chronically elevated; appears to be at baseline at 39.  - Chest XR showed cardiomegaly; pulmonary vascular congestion with mild prominence of the interstitial markings possibly from CHF; findings suggestive of calcified right hilar nodes.  - Rhiannon, monitor O2 sats, continue  supplemental oxygen and wean as tolerated.  - BNP normal but pt seems wet on exam. Give lasix 40 mg IV. Monitor I&Os. Will order echo.          Dermatitis of lower extremity    Bilateral LE dermatitis with erythema, no warmth or induration. No open wound noted. Pt states this is chronic and she is followed by home health but no record of this noted in the chart in the last year.          Alzheimer's dementia without behavioral disturbance    Continue home aricept. Consult PT/OT.          Morbid obesity due to excess calories    Physical deconditioning  Pt would benefit from weight loss. Counseled on importance of diet and weight loss. She uses a ?walker/?wheelchair at home and is mostly bedbound. Unsure of ADLs status. Consult PT/OT.          Mixed hyperlipidemia    Pt used to take lipitor, denies taking now. Will check lipids.          Essential hypertension    -162. Pt takes amlodipine 5 mg daily. Continue home med and monitor BP.          Type 2 diabetes mellitus treated without insulin    Diabetic neuropathy  Pt takes glipizide 5 mg daily. Will hold during hospital admission. Check A1c. Monitor BG and titrate with SSI. Continue home lyrica. Diabetic diet.          CKD (chronic kidney disease) stage 3, GFR 30-59 ml/min    Pt refuses to see outpatient nephrology. Cr at 1.0. Continue to monitor. Avoid nephrotoxins and renally dose meds.            VTE Risk Mitigation (From admission, onward)    None             Nahed Hernandez PA-C  Department of Hospital Medicine   [facility]

## 2018-09-12 NOTE — PLAN OF CARE
Problem: Patient Care Overview  Goal: Plan of Care Review  Outcome: Ongoing (interventions implemented as appropriate)  Patient on oxygen with documented flow.  Will attempt to wean per O2 order protocol. No resp distress noted. The proper method of use, as well as anticipated side effects, of this aerosol treatment are discussed and demonstrated to the patient.  Will continue to monitor.

## 2018-09-12 NOTE — ASSESSMENT & PLAN NOTE
Bilateral LE dermatitis with erythema, no warmth or induration. No open wound noted. Pt states this is chronic and she is followed by home health but no record of this noted in the chart in the last year. Plan is for home health on discharge.

## 2018-09-12 NOTE — PT/OT/SLP EVAL
Physical Therapy Evaluation    Patient Name:  Adela Jessica   MRN:  [id number]    Recommendations:     Discharge Recommendations:  home with home health   Discharge Equipment Recommendations: hospital bed   Barriers to discharge: None    Assessment:     Adela Jessica is a 78 y.o. female admitted with a medical diagnosis of Acute on chronic respiratory failure with hypoxia.  She presents with the following impairments/functional limitations:  weakness, gait instability, decreased lower extremity function, impaired self care skills, impaired functional mobilty, impaired endurance, impaired balance, edema, pain, impaired cardiopulmonary response to activity, impaired skin . Patient close to her functional baseline. Would benefit from Home Health assessment.    Rehab Prognosis:  good; patient would benefit from acute skilled PT services to address these deficits and reach maximum level of function.      Recent Surgery: * No surgery found *      Plan:     During this hospitalization, patient to be seen 6 x/week to address the above listed problems via gait training, therapeutic activities, therapeutic exercises  · Plan of Care Expires:  10/12/18   Plan of Care Reviewed with: patient, sibling    Subjective     Communicated with primary nurse prior to session.  Patient found supine upon PT entry to room, agreeable to evaluation.      Chief Complaint: weakness  Patient comments/goals: go home  Pain/Comfort:  · Pain Rating 1: other (see comments)(did not rate on scale chronic knees)  · Location - Side 1: Bilateral  · Location - Orientation 1: generalized  · Location 1: knee    Patients cultural, spiritual, Anabaptist conflicts given the current situation:      Living Environment:  Lives with sitter in mobile home ramp entrance  Prior to admission, patients level of function was independent.  Patient has the following equipment: shower chair, walker, rolling, bedside commode.  DME owned (not currently used): none.  Upon  discharge, patient will have assistance from family.    Objective:     Patient found with: telemetry, oxygen     General Precautions: Standard, fall, respiratory   Orthopedic Precautions:N/A   Braces: N/A     Exams:  · RLE ROM: limited active and passive hip,knee and ankle  · RLE Strength: 3/5 to -4/5  · LLE ROM: limited active and passive hip ,knee and ankle  · LLE Strength: 3/5 to -4/5    Functional Mobility:  · Bed Mobility:     · Supine to Sit: minimum assistance  · Sit to Supine: minimum assistance and moderate assistance  · Transfers:     · Sit to Stand:  contact guard assistance with no AD  · Gait: 12 ft with RW and CG   · Balance: fair with RW    AM-PAC 6 CLICK MOBILITY  Total Score:17       Therapeutic Activities and Exercises:   na    Patient left HOB elevated with call button in reach, bed alarm on and family present.    GOALS:   Multidisciplinary Problems     Physical Therapy Goals        Problem: Physical Therapy Goal    Goal Priority Disciplines Outcome Goal Variances Interventions   Physical Therapy Goal     PT, PT/OT Ongoing (interventions implemented as appropriate)     Description:  Goals to be met by: 10/12/2018     Patient will increase functional independence with mobility by performin. Supine to sit with Modified [place]  2. Sit to stand transfer with Modified [place]  3. Bed to chair transfer with Modified [place] using Rolling Walker  4. Gait  x 20 feet with Modified [place] using Rolling Walker.                       History:     Past Medical History:   Diagnosis Date    Arthritis     CKD (chronic kidney disease)     Dementia     Depression     DM2 (diabetes mellitus, type 2)     GERD (gastroesophageal reflux disease)     HLD (hyperlipidemia)     HTN (hypertension)     HIWOT on CPAP     Streptococcal pneumonia 2017       Past Surgical History:   Procedure Laterality Date    HYSTERECTOMY         Clinical Decision Making:     History  Co-morbidities and  personal factors that may impact the plan of care Examination  Body Structures and Functions, activity limitations and participation restrictions that may impact the plan of care Clinical Presentation   Decision Making/ Complexity Score   Co-morbidities:   [] Time since onset of injury / illness / exacerbation  [] Status of current condition  []Patient's cognitive status and safety concerns    [x] Multiple Medical Problems (see med hx)  Personal Factors:   [] Patient's age  [x] Prior Level of function   [] Patient's home situation (environment and family support)  [] Patient's level of motivation  [] Expected progression of patient      HISTORY:(criteria)    [] 98521 - no personal factors/history    [x] 56756 - has 1-2 personal factor/comorbidity     [] 96745 - has >3 personal factor/comorbidity     Body Regions:  [] Objective examination findings  [] Head     []  Neck  [] Trunk   [] Upper Extremity  [x] Lower Extremity    Body Systems:  [] For communication ability, affect, cognition, language, and learning style: the assessment of the ability to make needs known, consciousness, orientation (person, place, and time), expected emotional /behavioral responses, and learning preferences (eg, learning barriers, education  needs)  [x] For the neuromuscular system: a general assessment of gross coordinated movement (eg, balance, gait, locomotion, transfers, and transitions) and motor function  (motor control and motor learning)  [x] For the musculoskeletal system: the assessment of gross symmetry, gross range of motion, gross strength, height, and weight  [x] For the integumentary system: the assessment of pliability(texture), presence of scar formation, skin color, and skin integrity  [] For cardiovascular/pulmonary system: the assessment of heart rate, respiratory rate, blood pressure, and edema     Activity limitations:    [] Patient's cognitive status and saf ety concerns          [] Status of current condition       [] Weight bearing restriction  [] Cardiopulmunary Restriction    Participation Restrictions:   [] Goals and goal agreement with the patient     [] Rehab potential (prognosis) and probable outcome      Examination of Body System: (criteria)    [x] 72110 - addressing 1-2 elements    [] 74316 - addressing a total of 3 or more elements     [] 95304 -  Addressing a total of 4 or more elements         Clinical Presentation: (criteria)  Stable - 45051     On examination of body system using standardized tests and measures patient presents with 1-2 elements from any of the following: body structures and functions, activity limitations, and/or participation restrictions.  Leading to a clinical presentation that is considered stable and/or uncomplicated                              Clinical Decision Making  (Eval Complexity):  Low- 35903     Time Tracking:     PT Received On: 10/12/18  PT Start Time: 1000     PT Stop Time: 1020  PT Total Time (min): 20 min     Billable Minutes: Evaluation 20      Jack Cruz, PT  09/12/2018

## 2018-09-12 NOTE — NURSING
Received a 78 yrs old female patient from ER with the H/O worsening SOB and both legs swelling for 2 - 3 days.Has home O2 normally on 3 litres at home. Has CPAP machine at home but not using it regularly. Now she is on 2 litres via nasal cannula. Vital signs taken and are with in normal limits. Room and jackson orientation given. Importance of Bed alarms and prevention of fall explained. All basic needs met. Made her comfortable in bed.

## 2018-09-12 NOTE — PLAN OF CARE
Problem: Physical Therapy Goal  Goal: Physical Therapy Goal  Goals to be met by: 10/12/2018     Patient will increase functional independence with mobility by performin. Supine to sit with Modified [place]  2. Sit to stand transfer with Modified [place]  3. Bed to chair transfer with Modified [place] using Rolling Walker  4. Gait  x 20 feet with Modified [place] using Rolling Walker.     Outcome: Ongoing (interventions implemented as appropriate)  Recommend Home with Home Health  May benefit from hospital bed.

## 2018-09-12 NOTE — PROGRESS NOTES
"[facility]  Progress Note    Patient Name: Adela Jessica  MRN: [id number]  Patient Class: IP- Inpatient   Admission Date: 9/11/2018  Length of Stay: 1 days  Attending Physician: Narinder Junior MD  Primary Care Provider: Cindy Spencer NP      Subjective:     Principal Problem:Acute on chronic respiratory failure with hypoxia    HPI:  Ms. Jessica is a 76 y/o  female with a history of morbid obesity, HIWOT, hypertension, DM type II without current use of insulin, chronic bilateral lower extremity dermatitis, dementia, depression, GERD, home oxygen dependence, and chronic kidney disease who presents to the [facility] ED with complaint of worsening shortness of breath and leg swelling for the past 2-3 days. She also admits to productive cough with green sputum. She states that she normally uses 3 L O2 via nasal cannula and inhalers at home which did not provide relief. She denies any exacerbating or alleviating factors. She states that she has taken all of her regular medications. Pt states that she has a CPAP machine at home but does not use it because it "blows too much air in my face." She denies fever, chills, headaches, dizziness, chest pain, nausea, vomiting, abdominal pain, changes in urinary or bowel habits. In the ED she was found to be hypoxic with documented O2 sat at 93%. Chest XR showed cardiomegaly; pulmonary vascular congestion with mild prominence of the interstitial markings possibly from CHF; and findings suggestive of calcified right hilar nodes. WBC count normal, UA negative, BNP 26, troponin negative. She was given nebulizer treatments with improvement in SpO2. She was admitted to [facility].    She lives with her sister in [facility]. She uses a wheelchair to ambulate and is mostly bed bound. Unsure if patient is an accurate historian given known dementia. She denies tobacco, alcohol, or illicit drug use. Her PCP is AMANDA White" [place].         Hospital Course:  Pt with known HIWOT and not using CPAP machine at home. Chest XR showed cardiomegaly; pulmonary vascular congestion with mild prominence of the interstitial markings possibly from CHF; findings suggestive of calcified right hilar nodes. A dose of lasix 40 mg IV was given. WBC count normal. Afebrile. Protein was low but urine had no gross proteinuria. EKG showed NSR with no STEMI. Troponin negative. BNP likely falsely low in the setting of morbid obesity. CO2 chronically elevated; appeared to be at baseline at 39. Duoneb treatments were given. An echo was ordered.    9/12/18: CO2 elevated to 46 after dose of IV lasix. Pt put out 4900 cc urine overnight. Potassium and magnesium slightly decreased and were replaced. SpO2 at 91-93% on 2 L O2 via NC. Echo showed EF 55-60%, no wall motion abnormalities. Indicative of heart failure with preserved ejection fraction. Lasix 20 mg IV was given.      Interval History: Pt seen and examined at bedside. Her sister is present. She states her SOB has improved some. Currently on 2 L O2 via NC. She denies any acute complaints.    Review of Systems   Constitutional: Positive for fatigue. Negative for appetite change, chills and fever.   Respiratory: Positive for cough and shortness of breath. Negative for chest tightness and wheezing.    Cardiovascular: Positive for leg swelling. Negative for chest pain.   Gastrointestinal: Negative for abdominal pain, constipation, diarrhea, nausea and vomiting.   Endocrine: Negative for cold intolerance, heat intolerance, polydipsia, polyphagia and polyuria.   Genitourinary: Negative for difficulty urinating, dysuria, flank pain, hematuria and urgency.   Musculoskeletal: Positive for gait problem. Negative for arthralgias, myalgias, neck pain and neck stiffness.   Skin: Positive for rash.   Neurological: Negative for dizziness, syncope, light-headedness and headaches.   Psychiatric/Behavioral: Negative for agitation,  confusion and sleep disturbance.     Objective:     Vital Signs (Most Recent):  Temp: 99 °F (37.2 °C) (09/12/18 1124)  Pulse: 86 (09/12/18 1124)  Resp: 18 (09/12/18 1124)  BP: 125/61 (09/12/18 1124)  SpO2: (!) 93 % (09/12/18 1109) Vital Signs (24h Range):  Temp:  [97.4 °F (36.3 °C)-99.4 °F (37.4 °C)] 99 °F (37.2 °C)  Pulse:  [82-93] 86  Resp:  [16-22] 18  SpO2:  [91 %-100 %] 93 %  BP: (125-179)/(61-77) 125/61     Weight: (!) 152.3 kg (335 lb 12.2 oz)  Body mass index is 54.19 kg/m².    Intake/Output Summary (Last 24 hours) at 9/12/2018 1520  Last data filed at 9/12/2018 1230  Gross per 24 hour   Intake 225 ml   Output 5700 ml   Net -5475 ml      Physical Exam   Constitutional: She is oriented to person, place, and time. She appears well-developed and well-nourished. No distress.   Morbidly obese.   HENT:   Head: Normocephalic and atraumatic.   Eyes: Conjunctivae and EOM are normal. Pupils are equal, round, and reactive to light.   Neck: Normal range of motion. Neck supple.   Cardiovascular: Normal rate, regular rhythm, normal heart sounds and intact distal pulses.   Pulmonary/Chest: Effort normal and breath sounds normal. No respiratory distress. She has no wheezes.   Abdominal: Soft. Bowel sounds are normal. There is no tenderness.   Musculoskeletal: She exhibits edema (1+ B/l LE ). She exhibits no tenderness.   B/l LE weakness   Neurological: She is alert and oriented to person, place, and time. She is not disoriented. GCS eye subscore is 4. GCS verbal subscore is 5. GCS motor subscore is 6.   Skin: Skin is warm and dry. She is not diaphoretic. There is erythema (Chronic bilateral LE dermatitis. No warmth or induration).   Psychiatric: She has a normal mood and affect. Her behavior is normal. Judgment and thought content normal.   Nursing note and vitals reviewed.      Significant Labs: All pertinent labs within the past 24 hours have been reviewed.    Significant Imaging: I have reviewed all pertinent imaging  results/findings within the past 24 hours.    Assessment/Plan:      * Acute on chronic respiratory failure with hypoxia    Heart failure with preserved ejection fraction  Chronic hypercapnia  Obstructive sleep apnea  Supplemental oxygen dependent  Pt presents with 2-3 days of worsening SOB, leg swelling, and productive cough. Pt on 3 L O2 via NC at home. States she does not use her CPAP machine as recommended.    - ?centrilobular emphysema noted in chart history. Pt denies history of smoking.  - WBC count normal. Afebrile. CO2 chronically elevated; appears to be at baseline at 39. Elevated to 46 after a dose of IV lasix. Continue to monitor.  - Chest XR showed cardiomegaly; pulmonary vascular congestion with mild prominence of the interstitial markings possibly from CHF; findings suggestive of calcified right hilar nodes.  - SpO2 at 93% on 2 L O2 via NC. Continue duonebs prn and monitor O2 sats.  - Echo showed EF 55-60%, no wall motion abnormalities. Give dose of lasix 20 mg IV. Monitor and replace electrolytes as needed. Monitor I&Os.          Dermatitis of lower extremity    Bilateral LE dermatitis with erythema, no warmth or induration. No open wound noted. Pt states this is chronic and she is followed by home health but no record of this noted in the chart in the last year. Plan is for home health on discharge.          Alzheimer's dementia without behavioral disturbance    Continue home aricept. PT and OT consulted, appreciate input. Recommend HH on discharge.          Morbid obesity due to excess calories    Physical deconditioning  Pt would benefit from weight loss. Counseled on importance of diet and weight loss. She uses a ?walker/?wheelchair at home and is mostly bedbound. Unsure of ADLs status. PT and OT consulted, appreciate recommendations.          Essential hypertension    -154. Pt takes amlodipine 5 mg daily. Continue home med and monitor BP.          Type 2 diabetes mellitus treated without  insulin    Diabetic neuropathy  A1c at 4.9. Pt takes glipizide 5 mg daily. Will hold during hospital admission. Monitor BG and titrate with SSI as needed.. Continue home lyrica. Diabetic diet.          CKD (chronic kidney disease) stage 3, GFR 30-59 ml/min    Pt refuses to see outpatient nephrology. Cr at 1.0 > 0.9. Continue to monitor. Avoid nephrotoxins and renally dose meds.            VTE Risk Mitigation (From admission, onward)        Ordered     enoxaparin injection 40 mg  Daily      09/11/18 2157     IP VTE HIGH RISK PATIENT  Once      09/11/18 2157     Place sequential compression device  Until discontinued      09/11/18 2157     Place ROB hose  Until discontinued      09/11/18 2157            Nahed Hernandez PA-C  Department of Hospital Medicine   [facility]

## 2018-09-12 NOTE — ASSESSMENT & PLAN NOTE
Heart failure with preserved ejection fraction  Chronic hypercapnia  Obstructive sleep apnea  Supplemental oxygen dependent  Pt presents with 2-3 days of worsening SOB, leg swelling, and productive cough. Pt on 3 L O2 via NC at home. States she does not use her CPAP machine as recommended.    - ?centrilobular emphysema noted in chart history. Pt denies history of smoking.  - WBC count normal. Afebrile. CO2 chronically elevated; appears to be at baseline at 39. Elevated to 46 after a dose of IV lasix. Continue to monitor.  - Chest XR showed cardiomegaly; pulmonary vascular congestion with mild prominence of the interstitial markings possibly from CHF; findings suggestive of calcified right hilar nodes.  - SpO2 at 93% on 2 L O2 via NC. Continue duonebs prn and monitor O2 sats.  - Echo showed EF 55-60%, no wall motion abnormalities. Give dose of lasix 20 mg IV. Monitor and replace electrolytes as needed. Monitor I&Os.

## 2018-09-12 NOTE — PLAN OF CARE
Problem: Patient Care Overview  Goal: Plan of Care Review  Outcome: Ongoing (interventions implemented as appropriate)  Pt on document o2 flow  of 2l-nc..neb tx given radha.

## 2018-09-12 NOTE — PLAN OF CARE
Problem: Occupational Therapy Goal  Goal: Occupational Therapy Goal  Goals to be met by: 10/12     Patient will increase functional independence with ADLs by performing:    UE Dressing with Set-up Assistance.  LE Dressing with Set-up Assistance.  Grooming while standing with Supervision.  Toileting from toilet with Supervision for hygiene and clothing management.   Toilet transfer to toilet with Supervision.  Upper extremity exercise program x10 reps per handout, with independence.    Outcome: Ongoing (interventions implemented as appropriate)  OT eval performed, report to follow    Rec home w/HH

## 2018-09-12 NOTE — HOSPITAL COURSE
Ms. Jessica is a 77 y/o female with known HIWOT and COPD, not using CPAP machine nightly as prescribed at home, presented with 2-3 days of worsening shortness of breath and leg swelling. Chest XR showed cardiomegaly; pulmonary vascular congestion with mild prominence of the interstitial markings possibly from CHF; findings suggestive of calcified right hilar nodes. A dose of lasix 40 mg IV was given. WBC count normal. Afebrile. BP controlled on home amlodipine. Chronic anemia, H/H stable. Protein was low but urine had no gross proteinuria. UA negative. EKG showed NSR with no STEMI. Troponin negative. BNP at 26, likely falsely low in the setting of morbid obesity. CO2 chronically elevated; appeared to be at baseline at 39. Duoneb treatments were given and supplemental oxygen via nasal cannula continued. PT and OT were consulted. An echocardiogram was ordered. CO2 elevated to 46 after dose of IV lasix. Pt put out 4900 cc urine overnight. Potassium and magnesium slightly decreased and were replaced. SpO2 remained at 92-96% on 2 L O2 via NC with no respiratory distress. Echo showed normal LV systolic function (EF 55-60%), normal LV diastolic function, normal RV systolic function, PA systolic pressure is 24 mmHg, no wall motion abnormalities. Lasix 20 mg IV was given with improvement of shortness of breath to patient's baseline. Hemodynamically stable and ready for discharge. She was discharged to home with continuation of home health, lasix 40 mg PO three times weekly, potassium chloride 40 mEq PO three times weekly, and follow-up with her PCP.

## 2018-09-12 NOTE — PT/OT/SLP EVAL
Occupational Therapy   Evaluation    Name: Adela Jessica  MRN: [id number]  Admitting Diagnosis:  Acute on chronic respiratory failure with hypoxia      Recommendations:     Discharge Recommendations: home health PT  Discharge Equipment Recommendations:  hospital bed, other (see comments)(lift chair)  Barriers to discharge:  None    History:     Occupational Profile:  Living Environment: Lives w/sister in  ramp entry, tub/shower; sleeps in recliner  Previous level of function: mod I  Roles and Routines:   Equipment Used at Home:  bedside commode, walker, rolling, shower chair  Assistance upon Discharge: family    Past Medical History:   Diagnosis Date    Arthritis     CKD (chronic kidney disease)     Dementia     Depression     DM2 (diabetes mellitus, type 2)     GERD (gastroesophageal reflux disease)     HLD (hyperlipidemia)     HTN (hypertension)     HIWOT on CPAP     Streptococcal pneumonia 1/27/2017       Past Surgical History:   Procedure Laterality Date    HYSTERECTOMY         Subjective     Chief Complaint: weakness  Patient/Family Comments/goals: regain strength    Pain/Comfort:  · Pain Rating 1: (did not rate)  · Location - Orientation 1: generalized    Patients cultural, spiritual, Holiness conflicts given the current situation:      Objective:     Communicated with: nurse prior to session.  Patient found all lines intact, call button in reach, bed alarm on and sister present and peripheral IV, telemetry, oxygen upon OT entry to room.    General Precautions: Standard, fall, respiratory   Orthopedic Precautions:    Braces:       Occupational Performance:    Bed Mobility:    · Patient completed Rolling/Turning to Right with stand by assistance  · Patient completed Scooting/Bridging with stand by assistance  · Patient completed Supine to Sit with stand by assistance  · Patient completed Sit to Supine with minimum assistance    Functional Mobility/Transfers:  · Patient completed Sit <> Stand Transfer  "with stand by assistance and contact guard assistance  with  rolling walker   · Patient completed Toilet Transfer Stand Pivot technique with stand by assistance and contact guard assistance with  rolling walker and bedside commode  · Functional Mobility: SBA-CGA w/RW ~20'    Activities of Daily Living:  · Feeding:  modified independence    · Lower Body Dressing: stand by assistance slip on shoes  · Toileting: stand by assistance and contact guard assistance      Cognitive/Visual Perceptual:  AO4    Physical Exam:  BUE AROM/strength WFL  Good sit balance, fair+ stand balance, poor endurance    AMPAC 6 Click ADL:  AMPAC Total Score: 19    Treatment & Education:  Pt/fam educated on role of OT/POC  Education:    Patient left HOB elevated with all lines intact, call button in reach, bed alarm on, nurse notified and sister present    Assessment:     Adela Jessica is a 78 y.o. female with a medical diagnosis of Acute on chronic respiratory failure with hypoxia.  She presents with the following performance deficits affecting function: weakness, impaired functional mobilty, impaired endurance, gait instability, pain, impaired self care skills, impaired balance, decreased lower extremity function, decreased upper extremity function, edema, impaired cardiopulmonary response to activity.      Rehab Prognosis: Good; patient would benefit from acute skilled OT services to address these deficits and reach maximum level of function.         Clinical Decision Makin.  OT Low:  "Pt evaluation falls under low complexity for evaluation coding due to performance deficits noted in 1-3 areas as stated above and 0 co-morbities affecting current functional status. Data obtained from problem focused assessments. No modifications or assistance was required for completion of evaluation. Only brief occupational profile and history review completed."     Plan:     Patient to be seen 5 x/week to address the above listed problems via " self-care/home management, therapeutic activities, therapeutic exercises  · Plan of Care Expires: 10/12/18  · Plan of Care Reviewed with: patient, sibling    This Plan of care has been discussed with the patient who was involved in its development and understands and is in agreement with the identified goals and treatment plan    GOALS:   Multidisciplinary Problems     Occupational Therapy Goals        Problem: Occupational Therapy Goal    Goal Priority Disciplines Outcome Interventions   Occupational Therapy Goal     OT, PT/OT Ongoing (interventions implemented as appropriate)    Description:  Goals to be met by: 10/12     Patient will increase functional independence with ADLs by performing:    UE Dressing with Set-up Assistance.  LE Dressing with Set-up Assistance.  Grooming while standing with Supervision.  Toileting from toilet with Supervision for hygiene and clothing management.   Toilet transfer to toilet with Supervision.  Upper extremity exercise program x10 reps per handout, with independence.                      Time Tracking:     OT Date of Treatment: 09/12/18  OT Start Time: 0955  OT Stop Time: 1018  OT Total Time (min): 23 min w/PT    Billable Minutes:Evaluation 15 self care 8    Jermaine Ge OT  9/12/2018

## 2018-09-12 NOTE — ASSESSMENT & PLAN NOTE
Pt refuses to see outpatient nephrology. Cr at 1.0 > 0.9. Continue to monitor. Avoid nephrotoxins and renally dose meds.

## 2018-09-12 NOTE — PLAN OF CARE
"TN went to meet with patient, sister Zara at bedside. Patient lives with her sister Zara at home. She has a walker, inhalers, oxygen and Bipap?or Cpap? At home. Patient reports her oxygen is supplied by [employer] (concentrator and portable tank). Patient is current with [facility], but reports she may want to switch home health companies. They do not drive, but her nephew Yaya provides transport to all follow-up appointments. Patient reports her sister helps her at home, "when she is not feeling sick herself." TN updated Kelly MUSA (patient's ). She would benefit from an Outpatient  as well.      09/12/18 1640   Discharge Assessment   Assessment Type Discharge Planning Assessment   Confirmed/corrected address and phone number on facesheet? Yes   Assessment information obtained from? Patient   Prior to hospitilization cognitive status: Alert/Oriented   Prior to hospitalization functional status: Assistive Equipment;Needs Assistance   Current cognitive status: Alert/Oriented   Current Functional Status: Needs Assistance;Assistive Equipment   Facility Arrived From: Home with Home Health   Lives With sibling(s)   Able to Return to Prior Arrangements yes   Who are your caregiver(s) and their phone number(s)? Yaya Moya--Nephew--[phone]   Patient's perception of discharge disposition home health   Readmission Within The Last 30 Days no previous admission in last 30 days   Equipment Currently Used at Home BIPAP;CPAP;walker, rolling;oxygen   Does the patient have transportation home? Yes   Transportation Available family or friend will provide   Dialysis Name and Scheduled days N/A   Discharge Plan A Home with family;Home Health   Discharge Plan B Home with family   Patient/Family In Agreement With Plan yes     Jolene Naik RN  Transition Navigator  [phone]  "

## 2018-09-12 NOTE — ASSESSMENT & PLAN NOTE
Diabetic neuropathy  A1c at 4.9. Pt takes glipizide 5 mg daily. Will hold during hospital admission. Monitor BG and titrate with SSI as needed.. Continue home lyrica. Diabetic diet.

## 2018-09-12 NOTE — ASSESSMENT & PLAN NOTE
Physical deconditioning  Pt would benefit from weight loss. Counseled on importance of diet and weight loss. She uses a ?walker/?wheelchair at home and is mostly bedbound. Unsure of ADLs status. PT and OT consulted, appreciate recommendations.

## 2018-09-12 NOTE — ASSESSMENT & PLAN NOTE
Bilateral LE dermatitis with erythema, no warmth or induration. No open wound noted. Pt states this is chronic and she is followed by home health but no record of this noted in the chart in the last year.

## 2018-09-13 VITALS
OXYGEN SATURATION: 96 % | HEART RATE: 82 BPM | TEMPERATURE: 98 F | BODY MASS INDEX: 47.09 KG/M2 | WEIGHT: 293 LBS | SYSTOLIC BLOOD PRESSURE: 148 MMHG | RESPIRATION RATE: 20 BRPM | DIASTOLIC BLOOD PRESSURE: 68 MMHG | HEIGHT: 66 IN

## 2018-09-13 LAB
ANION GAP SERPL CALC-SCNC: 6 MMOL/L
BASOPHILS # BLD AUTO: 0.01 K/UL
BASOPHILS NFR BLD: 0.2 %
BUN SERPL-MCNC: 9 MG/DL
CALCIUM SERPL-MCNC: 9.3 MG/DL
CHLORIDE SERPL-SCNC: 92 MMOL/L
CO2 SERPL-SCNC: 46 MMOL/L
CREAT SERPL-MCNC: 0.9 MG/DL
DIFFERENTIAL METHOD: ABNORMAL
EOSINOPHIL # BLD AUTO: 0.1 K/UL
EOSINOPHIL NFR BLD: 2.2 %
ERYTHROCYTE [DISTWIDTH] IN BLOOD BY AUTOMATED COUNT: 14.3 %
EST. GFR  (AFRICAN AMERICAN): >60 ML/MIN/1.73 M^2
EST. GFR  (NON AFRICAN AMERICAN): >60 ML/MIN/1.73 M^2
FOLATE SERPL-MCNC: 16.5 NG/ML
GLUCOSE SERPL-MCNC: 109 MG/DL
HCT VFR BLD AUTO: 33.3 %
HGB BLD-MCNC: 9.7 G/DL
LYMPHOCYTES # BLD AUTO: 1.2 K/UL
LYMPHOCYTES NFR BLD: 19.6 %
MAGNESIUM SERPL-MCNC: 1.4 MG/DL
MCH RBC QN AUTO: 30.1 PG
MCHC RBC AUTO-ENTMCNC: 29.1 G/DL
MCV RBC AUTO: 103 FL
MONOCYTES # BLD AUTO: 0.5 K/UL
MONOCYTES NFR BLD: 7.9 %
NEUTROPHILS # BLD AUTO: 4.4 K/UL
NEUTROPHILS NFR BLD: 69.9 %
PLATELET # BLD AUTO: 263 K/UL
PMV BLD AUTO: 8.6 FL
POCT GLUCOSE: 117 MG/DL (ref 70–110)
POCT GLUCOSE: 97 MG/DL (ref 70–110)
POTASSIUM SERPL-SCNC: 3.6 MMOL/L
RBC # BLD AUTO: 3.22 M/UL
SODIUM SERPL-SCNC: 144 MMOL/L
VIT B12 SERPL-MCNC: 631 PG/ML
WBC # BLD AUTO: 6.24 K/UL

## 2018-09-13 PROCEDURE — 97116 GAIT TRAINING THERAPY: CPT

## 2018-09-13 PROCEDURE — 94761 N-INVAS EAR/PLS OXIMETRY MLT: CPT

## 2018-09-13 PROCEDURE — 25000003 PHARM REV CODE 250: Performed by: HOSPITALIST

## 2018-09-13 PROCEDURE — 83735 ASSAY OF MAGNESIUM: CPT

## 2018-09-13 PROCEDURE — 25000242 PHARM REV CODE 250 ALT 637 W/ HCPCS: Performed by: PHYSICIAN ASSISTANT

## 2018-09-13 PROCEDURE — 82746 ASSAY OF FOLIC ACID SERUM: CPT

## 2018-09-13 PROCEDURE — 85025 COMPLETE CBC W/AUTO DIFF WBC: CPT

## 2018-09-13 PROCEDURE — 94640 AIRWAY INHALATION TREATMENT: CPT

## 2018-09-13 PROCEDURE — 25000003 PHARM REV CODE 250: Performed by: PHYSICIAN ASSISTANT

## 2018-09-13 PROCEDURE — 27000221 HC OXYGEN, UP TO 24 HOURS

## 2018-09-13 PROCEDURE — 36415 COLL VENOUS BLD VENIPUNCTURE: CPT

## 2018-09-13 PROCEDURE — 97530 THERAPEUTIC ACTIVITIES: CPT

## 2018-09-13 PROCEDURE — 82607 VITAMIN B-12: CPT

## 2018-09-13 PROCEDURE — 63600175 PHARM REV CODE 636 W HCPCS: Performed by: PHYSICIAN ASSISTANT

## 2018-09-13 PROCEDURE — 80048 BASIC METABOLIC PNL TOTAL CA: CPT

## 2018-09-13 RX ORDER — MAGNESIUM SULFATE HEPTAHYDRATE 40 MG/ML
2 INJECTION, SOLUTION INTRAVENOUS ONCE
Status: COMPLETED | OUTPATIENT
Start: 2018-09-13 | End: 2018-09-13

## 2018-09-13 RX ORDER — POTASSIUM CHLORIDE 20 MEQ/1
40 TABLET, EXTENDED RELEASE ORAL
Qty: 30 TABLET | Refills: 3 | Status: SHIPPED | OUTPATIENT
Start: 2018-09-14

## 2018-09-13 RX ORDER — MICONAZOLE NITRATE 2 %
POWDER (GRAM) TOPICAL 2 TIMES DAILY
Refills: 0 | COMMUNITY
Start: 2018-09-13

## 2018-09-13 RX ORDER — FUROSEMIDE 40 MG/1
40 TABLET ORAL
Qty: 12 TABLET | Refills: 3 | Status: SHIPPED | OUTPATIENT
Start: 2018-09-14 | End: 2019-09-14

## 2018-09-13 RX ORDER — MICONAZOLE NITRATE 2 %
POWDER (GRAM) TOPICAL 2 TIMES DAILY
Status: DISCONTINUED | OUTPATIENT
Start: 2018-09-13 | End: 2018-09-13 | Stop reason: HOSPADM

## 2018-09-13 RX ADMIN — MAGNESIUM SULFATE IN WATER 2 G: 40 INJECTION, SOLUTION INTRAVENOUS at 09:09

## 2018-09-13 RX ADMIN — AMLODIPINE BESYLATE 5 MG: 5 TABLET ORAL at 09:09

## 2018-09-13 RX ADMIN — IPRATROPIUM BROMIDE AND ALBUTEROL SULFATE 3 ML: .5; 3 SOLUTION RESPIRATORY (INHALATION) at 07:09

## 2018-09-13 RX ADMIN — IPRATROPIUM BROMIDE AND ALBUTEROL SULFATE 3 ML: .5; 3 SOLUTION RESPIRATORY (INHALATION) at 03:09

## 2018-09-13 RX ADMIN — IPRATROPIUM BROMIDE AND ALBUTEROL SULFATE 3 ML: .5; 3 SOLUTION RESPIRATORY (INHALATION) at 12:09

## 2018-09-13 RX ADMIN — DULOXETINE 60 MG: 30 CAPSULE, DELAYED RELEASE ORAL at 09:09

## 2018-09-13 RX ADMIN — PREGABALIN 50 MG: 50 CAPSULE ORAL at 09:09

## 2018-09-13 RX ADMIN — PENTOXIFYLLINE 400 MG: 400 TABLET, FILM COATED, EXTENDED RELEASE ORAL at 09:09

## 2018-09-13 RX ADMIN — MICONAZOLE NITRATE: 20 POWDER TOPICAL at 12:09

## 2018-09-13 NOTE — PT/OT/SLP DISCHARGE
Occupational Therapy Discharge Summary    Adela Jessica  MRN: [id number]   Principal Problem: Acute on chronic respiratory failure with hypoxia      Patient Discharged from acute Occupational Therapy on 9/13.  Please refer to prior OT note dated 9/12 for functional status.    Assessment:      Patient appropriate for care in another setting.    Objective:     GOALS:   Multidisciplinary Problems     Occupational Therapy Goals     Not on file          Multidisciplinary Problems (Resolved)        Problem: Occupational Therapy Goal    Goal Priority Disciplines Outcome Interventions   Occupational Therapy Goal   (Resolved)     OT, PT/OT Outcome(s) achieved    Description:  Goals to be met by: 10/12     Patient will increase functional independence with ADLs by performing:    UE Dressing with Set-up Assistance.  LE Dressing with Set-up Assistance.  Grooming while standing with Supervision.  Toileting from toilet with Supervision for hygiene and clothing management.   Toilet transfer to toilet with Supervision.  Upper extremity exercise program x10 reps per handout, with independence.                      Reasons for Discontinuation of Therapy Services  Transfer to alternate level of care.      Plan:     Patient Discharged to: Home with Home Health Service    Jermaine Ge OT  9/13/2018

## 2018-09-13 NOTE — PHYSICIAN QUERY
"PT Name: Adela Jessica  MR #: [id number]    Physician Query Form - Heart  Condition Clarification     CDS/: Shayy ZARAGOZA        Contact information [email or web address]  This form is a permanent document in the medical record.     Query Date: September 13, 2018    By submitting this query, we are merely seeking further clarification of documentation. Please utilize your independent clinical judgment when addressing the question(s) below.    The medical record contains the following   Indicators     Supporting Clinical Findings Location in Medical Record   X BNP . BNP likely falsely low in the setting of morbid obesity    BNP - 26   Hospital medicine note   Nahed Hernandez  9/12 347 pm   X EF 55-60% Hospital medicine note   Nahed Hernandez  9/12 347 pm   X Radiology findings Chest XR showed cardiomegaly; pulmonary vascular congestion with mild prominence of the interstitial markings possibly from CHF; findings suggestive of calcified right hilar nodes. Hospital medicine note   Nahed Hernandez  9/12 347 pm   X Echo Results Echo showed EF 55-60%, no wall motion abnormalities. Indicative of heart failure with preserved ejection fraction. Lasix 20 mg IV was given.   Hospital medicine note   Nahed Hernandez  9/12 347 pm    "Ascites" documented     X "SOB" or "ROBERT" documented Shortness of Breath   x 2 days, admits to productive cough with white to green sputum    Positive for cough, shortness of breath and wheezing.   ER MD Note      H&P   X "Hypoxia" documented Acute on chronic respiratory failure with hypoxia   H&P   X Heart Failure documented Echo showed EF 55-60%, no wall motion abnormalities. Indicative of heart failure with preserved ejection fraction   Hospital medicine note   Nahed Hernandez  9/12 347 pm   X "Edema" documented Positive for leg swelling.   H&P   X Diuretics/Meds after dose of IV lasix. Pt put out 4900 cc urine overnight        Furosemide 40 mg IV once 9/11 1851                       " 20 mg IV once 9/12 1535   [facility] medicine note   Nahed HOLLAND Mary  9/12 347 pm    Medication sheets    Treatment:      Other:      Heart failure (HF) can be acute, chronic or both. It is generally further specificed as systolic, diastolic, or combined. Lastly, it is important to identify an underlying etiology if known or suspected.     Common clues to acute exacerbation:  Rapidly progressive symptoms (w/in 2 weeks of presentation), using IV diuretics to treat, using supplemental O2, pulmonary edema on Xray, MI w/in 4 weeks, and/or BNP >500    Systolic Heart Failure: is defined as chart documentation of a left ventricular ejection fraction (LVEF) less than 40%     Diastolic Heart Failure: is defined as a left ventricular ejection fraction (LVEF) greater than 40%   +      Evidence of diastolic dysfunction on echocardiography OR    Right heart catheterization wedge pressure above 12 mm Hg OR    Left heart catheterization left ventricular end diastolic pressure 18 mm Hg or above.    References: *American Heart Association    The clinical guidelines noted below are only system guidelines, and do not replace the providers clinical judgment.     Provider, please specify the diagnosis associated with above clinical findings    [   x] Acute on Chronic Diastolic Heart Failure -    Pre-existing diastoic HF diagnosis.  EF > 40%  and acute HF symptoms documented                                   [   ] Chronic Diastolic Heart Failure - Pre-existing diastolic HF diagnosis.  EF > 40%  without  acute HF symptoms documented    [   ] Other (please specify): ___________________________________    [   ] Clinically Undetermined                          Please document in your progress notes daily for the duration of treatment until resolved and include in your discharge summary.

## 2018-09-13 NOTE — PROGRESS NOTES
Discharge instructions reviewed with patient, patient verbalized understanding, IV removed at bedside, family here for transportation, medication delivered to bedside.

## 2018-09-13 NOTE — PLAN OF CARE
[facility] HEALTH ORDERS  FACE TO FACE ENCOUNTER    Patient Name: Adela Jessica  YOB: 1940    PCP: Cindy Spencer NP   PCP Address: [address] [place], [place] [id number]  PCP Phone Number: [phone]  PCP Fax: [phone]    Encounter Date: 09/13/2018    Admit to Home Health    Diagnoses:  Active Hospital Problems    Diagnosis  POA    *Acute on chronic respiratory failure with hypoxia [J96.21]  Unknown     Chronic    Heart failure with preserved ejection fraction [I50.30]  Yes    Dyspnea [R06.00]  Yes    Dermatitis of lower extremity [L30.9]  Unknown     Chronic    Supplemental oxygen dependent [Z99.81]  Not Applicable     Chronic    HIWOT (obstructive sleep apnea) [G47.33]  Yes     Chronic    Alzheimer's dementia without behavioral disturbance [G30.9, F02.80]  Yes     Chronic    Morbid obesity due to excess calories [E66.01]  Yes    CKD (chronic kidney disease) stage 3, GFR 30-59 ml/min [N18.3]  Yes     Chronic    Essential hypertension [I10]  Yes     Chronic    Type 2 diabetes mellitus treated without insulin [E11.9]  Yes     Chronic      Resolved Hospital Problems    Diagnosis Date Resolved POA    Mixed hyperlipidemia [E78.2] 09/12/2018 Yes       Future Appointments   Date Time Provider Department Center   9/21/2018  3:00 PM Carla Mcknight MD [facility]     Follow-up Information     Cindy Spencer NP.    Specialty:  Family Medicine  Contact information:  [address] [place] [id number]  [phone]                   I have seen and examined this patient face to face today. My clinical findings that support the need for the home health skilled services and home bound status are the following:  Weakness causing balance and gait disturbance due to Weakness/morbid obesity making it taxing to leave home.  Requiring assistive device to leave home due to unsteady gait caused by  Weakness/Morbid obesity.  Medical restrictions requiring  assistance of another human to leave home due to Dyspnea on exertion (SOB), Unstable ambulation, Home oxygen requirement and Morbid Obesity.    Allergies:Review of patient's allergies indicates:  No Known Allergies     Diet: diabetic diet: 2000 calorie    Activities: activity as tolerated    Nursing:   SN to complete comprehensive assessment including routine vital signs. Instruct on disease process and s/s of complications to report to MD. Review/verify medication list sent home with the patient at time of discharge  and instruct patient/caregiver as needed. Frequency may be adjusted depending on start of care date.    Notify MD if SBP > 160 or < 90; DBP > 90 or < 50; HR > 120 or < 50; Temp > 101    Labs: BMP in 1 week      CONSULTS:    Physical Therapy to evaluate and treat. Evaluate for home safety and equipment needs; Establish/upgrade home exercise program. Perform / instruct on therapeutic exercises, gait training, transfer training, and Range of Motion.  Occupational Therapy to evaluate and treat. Evaluate home environment for safety and equipment needs. Perform/Instruct on transfers, ADL training, ROM, and therapeutic exercises.  Aide to provide assistance with personal care, ADLs, and vital signs.    MISCELLANEOUS CARE:  Routine Skin for Bedridden Patients: Instruct patient/caregiver to apply moisture barrier cream to all skin folds and wet areas in perineal area daily and after baths and all bowel movements.  Diabetic Care: SN to perform and educate Diabetic management with blood glucose monitoring: and Report CBG < 60 or > 350 to physician.  Home Oxygen:  Oxygen at 3 L/min nasal canula to be used:  At bedtime and As needed for SOB. and Assess oxygen saturation via pulse oximeter as needed for increase in SOB.  Please ensure patient has a functioning CPAP and provide education on its usage.    WOUND CARE ORDERS  n/a    Medications: Review discharge medications with patient and family and provide education.       Current Discharge Medication List      START taking these medications    Details   furosemide (LASIX) 40 MG tablet Take 1 tablet (40 mg total) by mouth 3 (three) times a week. On Monday, Wednesday, and Friday.  Qty: 12 tablet, Refills: 3      miconazole NITRATE 2 % (MICOTIN) 2 % top powder Apply topically 2 (two) times daily. To affected area of breast.  Refills: 0      potassium chloride SA (K-DUR,KLOR-CON) 20 MEQ tablet Take 2 tablets (40 mEq total) by mouth 3 (three) times a week. On Monday, Wednesday, and Friday.  Qty: 30 tablet, Refills: 3         CONTINUE these medications which have NOT CHANGED    Details   albuterol (PROVENTIL/VENTOLIN) 90 mcg/actuation inhaler Inhale 2 puffs into the lungs every 6 (six) hours as needed for Wheezing.  Qty: 2 each, Refills: 0    Comments: MDI inhaler      amitriptyline (ELAVIL) 100 MG tablet Take 100 mg by mouth nightly as needed for Insomnia.      amlodipine (NORVASC) 5 MG tablet Take 1 tablet (5 mg total) by mouth once daily.  Qty: 30 tablet, Refills: 11      donepezil (ARICEPT) 5 MG tablet Take 5 mg by mouth every evening.      duloxetine (CYMBALTA) 60 MG capsule Take 1 capsule (60 mg total) by mouth once daily.  Qty: 30 capsule, Refills: 2      fish oil-omega-3 fatty acids 300-1,000 mg capsule Take 1 g by mouth 2 (two) times daily.       fluticasone-salmeterol 100-50 mcg/dose (ADVAIR) 100-50 mcg/dose diskus inhaler Inhale 1 puff into the lungs 2 (two) times daily.      glipiZIDE (GLUCOTROL) 5 MG tablet Take 0.5 tablets (2.5 mg total) by mouth daily with breakfast.  Qty: 15 tablet, Refills: 1      pentoxifylline (TRENTAL) 400 mg TbSR Take 400 mg by mouth 2 (two) times daily with meals.      pregabalin (LYRICA) 50 MG capsule Take 1 capsule (50 mg total) by mouth 2 (two) times daily.      multivitamin (THERAGRAN) per tablet Take 1 tablet by mouth once daily.      nystatin-triamcinolone (MYCOLOG) ointment Apply topically 2 (two) times daily. To areas of redness  Qty: 60  g, Refills: 0      omega-3 acid ethyl esters (LOVAZA) 1 gram capsule Take 2 g by mouth 2 (two) times daily.         STOP taking these medications       atorvastatin (LIPITOR) 40 MG tablet Comments:   Reason for Stopping:               I certify that this patient is confined to her home and needs intermittent skilled nursing care, physical therapy and occupational therapy.      JARET Bridges MD  [facility]  Narinder Junior MD, [facility]   MD Nurys Shabazz PA-C Renee Melancon, PA-C Kristin Stein, PA-C Arekeva Selmon, NP-C  [address] [place] [id number]  Office Phone: [phone]  Office Fax: [phone]

## 2018-09-13 NOTE — PT/OT/SLP PROGRESS
Physical Therapy Treatment    Patient Name:  Adela Jessica   MRN:  [id number]    Recommendations:     Discharge Recommendations:  home with home health   Discharge Equipment Recommendations: hospital bed   Barriers to discharge: None    Assessment:     Adela Jessica is a 78 y.o. female admitted with a medical diagnosis of Acute on chronic respiratory failure with hypoxia.  She presents with the following impairments/functional limitations:  weakness, impaired endurance, impaired self care skills, impaired functional mobilty, gait instability, impaired balance, decreased upper extremity function, decreased coordination, decreased lower extremity function, decreased ROM, decreased safety awareness, impaired coordination, impaired skin, edema, impaired cardiopulmonary response to activity. Pt able to slightly increase ambulation distance this session. Ambulated ~25-30 ft with RW and CGA. Would benefit from continued PT services to increase pt's tolerance for increased out of bed therapeutic exercise and activity.    Rehab Prognosis:  good; patient would benefit from acute skilled PT services to address these deficits and reach maximum level of function.      Recent Surgery: * No surgery found *      Plan:     During this hospitalization, patient to be seen 6 x/week to address the above listed problems via gait training, therapeutic activities, therapeutic exercises  · Plan of Care Expires:  10/12/18   Plan of Care Reviewed with: patient, sibling    Subjective     Communicated with nurse prior to session.  Patient found supine with sister present upon PT entry to room, agreeable to treatment.      Chief Complaint: None expressed  Patient comments/goals: To get better  Pain/Comfort:  · Pain Rating 1: (Did nolt complain of pain)    Patients cultural, spiritual, Gnosticism conflicts given the current situation: None stated    Objective:     Patient found with: oxygen, telemetry     General Precautions: Standard, fall,  respiratory   Orthopedic Precautions:N/A   Braces: N/A     Functional Mobility:  · Bed Mobility:     · Rolling Right: modified independence  · Scooting: supervision and  To EOB  · Supine to Sit: contact guard assistance and minimum assistance  · Sit to Supine: minimum assistance  · Transfers:     · Sit to Stand:  contact guard assistance with rolling walker  · Gait:  ~25-30 ft in room with RW and CGA      AM-PAC 6 CLICK MOBILITY  Turning over in bed (including adjusting bedclothes, sheets and blankets)?: 4  Sitting down on and standing up from a chair with arms (e.g., wheelchair, bedside commode, etc.): 3  Moving from lying on back to sitting on the side of the bed?: 3  Moving to and from a bed to a chair (including a wheelchair)?: 3  Need to walk in hospital room?: 3  Climbing 3-5 steps with a railing?: 1  Basic Mobility Total Score: 17       Therapeutic Activities and Exercises:   All transfers with assistance as documented above. Pt requested to use B/S commode. Took 2-3 turning steps from EOB to sit on B/S commode. Sat on B/S commode ~3-4 minutes at which time pt unable to perform self-hygiene. Performed ambulation training ~25-30ft with RW and CGA. Set up to each lunch.    Patient left With HOB elevated to est lunch with all lines intact, call button in reach, bed alarm on, nurse notified and  sister present..    GOALS:   Multidisciplinary Problems     Physical Therapy Goals        Problem: Physical Therapy Goal    Goal Priority Disciplines Outcome Goal Variances Interventions   Physical Therapy Goal     PT, PT/OT Ongoing (interventions implemented as appropriate)     Description:  Goals to be met by: 10/12/2018     Patient will increase functional independence with mobility by performin. Supine to sit with Modified Gilbert  2. Sit to stand transfer with Modified Gilbert  3. Bed to chair transfer with Modified Gilbert using Rolling Walker  4. Gait  x 20 feet with Modified Gilbert  using Rolling Walker.                        Time Tracking:     PT Received On: 09/13/18  PT Start Time: 1209     PT Stop Time: 1235  PT Total Time (min): 26 min     Billable Minutes: Gait Training   11 and Therapeutic Activity   15    Treatment Type: Treatment  PT/PTA: PTA     PTA Visit Number: 1     Piper Bose, PTA  09/13/2018

## 2018-09-13 NOTE — PT/OT/SLP PROGRESS
Occupational Therapy      Patient Name:  Adela Jessica   MRN:  [id number]    Patient not seen today secondary to  Pt found sitting EOB, dressed, and awaiting discharge home. Pt reports she is a baseline and has no OT needs at this time. Will follow-up tomorrow.    MAR Wagner  9/13/2018

## 2018-09-13 NOTE — DISCHARGE SUMMARY
"[facility]\"" Medicine  Discharge Summary    Patient Name: Adela Jessica  MRN: [id number]  Admission Date: 9/11/2018  Hospital Length of Stay: 2 days  Discharge Date and Time: 9/13/2018  2:22 PM  Attending Physician: Narinder Junior MD  Discharging Provider: Nahed Hernandez PA-C  Primary Care Provider: Cindy Spencer NP      HPI:   Ms. Jessica is a 78 y/o  female with a history of morbid obesity, HIWOT, hypertension, DM type II without current use of insulin, chronic bilateral lower extremity dermatitis, dementia, anemia, depression, GERD, home oxygen dependence, and chronic kidney disease who presents to the [facility] ED with complaint of worsening shortness of breath and leg swelling for the past 2-3 days. She also admits to productive cough with green sputum. She states that she normally uses 3 L O2 via nasal cannula and inhalers at home which did not provide relief. She denies any exacerbating or alleviating factors. She states that she has taken all of her regular medications. Pt states that she has a CPAP machine at home but does not use it because it "blows too much air in my face." She denies fever, chills, headaches, dizziness, chest pain, nausea, vomiting, abdominal pain, changes in urinary or bowel habits. In the ED she was found to be hypoxic with documented O2 sat at 93%. Chest XR showed cardiomegaly; pulmonary vascular congestion with mild prominence of the interstitial markings possibly from CHF; and findings suggestive of calcified right hilar nodes. WBC count normal, UA negative, BNP 26, troponin negative. She was given nebulizer treatments with improvement in SpO2. She was admitted to [facility].    She lives with her sister in [facility]. She uses a wheelchair to ambulate and is mostly bed bound. Unsure if patient is an accurate historian given known dementia. She denies tobacco, alcohol, or illicit drug use. Her PCP is AMANDA Spencer.         * No " surgery found *      Hospital Course:   Ms. Jessica is a 77 y/o morbidly obese female with known HIWOT and COPD, not using CPAP machine nightly as prescribed at home, who presented with 2-3 days of worsening shortness of breath and leg swelling. Chest XR showed cardiomegaly; pulmonary vascular congestion with mild prominence of the interstitial markings possibly from CHF; findings suggestive of calcified right hilar nodes. A dose of lasix 40 mg IV was given. WBC count normal. Afebrile. BP controlled on home amlodipine. Chronic anemia, H/H stable. Protein was low but urine had no gross proteinuria. UA negative. EKG showed NSR with no STEMI. Troponin negative. BNP at 26, likely falsely low in the setting of morbid obesity. CO2 chronically elevated; appeared to be at baseline at 39. Duoneb treatments were given and supplemental oxygen via nasal cannula was continued. PT and OT were consulted. An echocardiogram was ordered. CO2 elevated to 46 after dose of IV lasix. Pt put out 4900 cc urine overnight. Potassium and magnesium slightly decreased and were replaced. SpO2 remained at 92-96% on 2 L O2 via NC with no respiratory distress. Echo showed normal LV systolic function (EF 55-60%), normal LV diastolic function, normal RV systolic function, PA systolic pressure 24 mmHg, no wall motion abnormalities. Lasix 20 mg IV was given with improvement of shortness of breath to patient's baseline. Hemodynamically stable and ready for discharge. She was discharged to home with continuation of home health, lasix 40 mg PO three times weekly, potassium chloride 40 mEq PO three times weekly, and follow-up with her PCP.       Consults:     No new Assessment & Plan notes have been filed under this hospital service since the last note was generated.  Service: Hospital Medicine    Final Active Diagnoses:    Diagnosis Date Noted POA    PRINCIPAL PROBLEM:  Acute on chronic respiratory failure with hypoxia [J96.21] 08/21/2017 Unknown      "Chronic    Heart failure with preserved ejection fraction [I50.30] 09/12/2018 Yes    Dyspnea [R06.00] 09/11/2018 Yes    Dermatitis of lower extremity [L30.9] 09/11/2018 Unknown     Chronic    Supplemental oxygen dependent [Z99.81] 08/21/2017 Not Applicable     Chronic    HIWOT (obstructive sleep apnea) [G47.33] 08/20/2017 Yes     Chronic    Alzheimer's dementia without behavioral disturbance [G30.9, F02.80] 01/25/2017 Yes     Chronic    Morbid obesity due to excess calories [E66.01] 01/25/2017 Yes    CKD (chronic kidney disease) stage 3, GFR 30-59 ml/min [N18.3] 11/11/2013 Yes     Chronic    Essential hypertension [I10] 11/11/2013 Yes     Chronic    Type 2 diabetes mellitus treated without insulin [E11.9] 11/11/2013 Yes     Chronic      Problems Resolved During this Admission:    Diagnosis Date Noted Date Resolved POA    Mixed hyperlipidemia [E78.2] 11/11/2013 09/12/2018 Yes       Discharged Condition: good    Disposition: Home-Health Care [facility]    Follow Up:  Follow-up Information     Carla Mcknight MD On 9/21/2018.    Specialty:  Hospitalist  Why:  time: 3:00  Contact information:  [address]  [address]  [place] [id number] [phone]                 Patient Instructions:      HOSPITAL BED FOR HOME USE     Order Specific Question Answer Comments   Type: Semi-electric    Length of need (1-99 months): 99    Does patient have medical equipment at home? BIPAP    Does patient have medical equipment at home? CPAP    Does patient have medical equipment at home? walker, rolling    Does patient have medical equipment at home? oxygen    Height: 5' 6" (1.676 m)    Weight: 152.3 kg (335 lb 12.2 oz)    Please check all that apply: Patient requires positioning of the body in ways not feasible in an ordinary bed due to a medical condition which is expected to last at least one month.    Please check all that apply: Patient requires the head of bed to be elevated more than 30 degrees most of the time due to " congestive heart failure, chronic pulmonary disease, or aspiration.  Pillows and wedges have been considered and ruled out.    Please check all that apply: Patient requires a bed height different than a fixed height hospital bed to permit transfers to chair, wheelchair, or standing.      Diet diabetic     Notify your health care provider if you experience any of the following:  temperature >100.4     Notify your health care provider if you experience any of the following:  persistent nausea and vomiting or diarrhea     Notify your health care provider if you experience any of the following:  difficulty breathing or increased cough     Notify your health care provider if you experience any of the following:  severe persistent headache     Notify your health care provider if you experience any of the following:  worsening rash     Notify your health care provider if you experience any of the following:  persistent dizziness, light-headedness, or visual disturbances     Notify your health care provider if you experience any of the following:  increased confusion or weakness     Notify your health care provider if you experience any of the following:  severe uncontrolled pain     Activity as tolerated       Significant Diagnostic Studies: Labs: All labs within the past 24 hours have been reviewed    Pending Diagnostic Studies:     None         Medications:  Reconciled Home Medications:      Medication List      START taking these medications    furosemide 40 MG tablet  Commonly known as:  LASIX  Take 1 tablet (40 mg total) by mouth 3 (three) times a week on Monday, Wednesday, and Friday.     miconazole NITRATE 2 % 2 % top powder  Commonly known as:  MICOTIN  Apply topically 2 (two) times daily. To affected area of breast.     potassium chloride SA 20 MEQ tablet  Commonly known as:  K-DUR,KLOR-CON  Take 2 tablets (40 mEq total) by mouth 3 (three) times a week on Monday, Wednesday, and Friday.        CHANGE how you take  these medications    glipiZIDE 5 MG tablet  Commonly known as:  GLUCOTROL  Take 0.5 tablets (2.5 mg total) by mouth daily with breakfast.  What changed:  how much to take     pregabalin 50 MG capsule  Commonly known as:  LYRICA  Take 1 capsule (50 mg total) by mouth 2 (two) times daily.  What changed:  when to take this        CONTINUE taking these medications    albuterol 90 mcg/actuation inhaler  Commonly known as:  PROVENTIL/VENTOLIN  Inhale 2 puffs into the lungs every 6 (six) hours as needed for Wheezing.     amitriptyline 100 MG tablet  Commonly known as:  ELAVIL  Take 100 mg by mouth nightly as needed for Insomnia.     amLODIPine 5 MG tablet  Commonly known as:  NORVASC  Take 1 tablet (5 mg total) by mouth once daily.     donepezil 5 MG tablet  Commonly known as:  ARICEPT  Take 5 mg by mouth every evening.     DULoxetine 60 MG capsule  Commonly known as:  CYMBALTA  Take 1 capsule (60 mg total) by mouth once daily.     fish oil-omega-3 fatty acids 300-1,000 mg capsule  Take 1 g by mouth 2 (two) times daily.     fluticasone-salmeterol 100-50 mcg/dose 100-50 mcg/dose diskus inhaler  Commonly known as:  ADVAIR  Inhale 1 puff into the lungs 2 (two) times daily.     multivitamin per tablet  Commonly known as:  THERAGRAN  Take 1 tablet by mouth once daily.     nystatin-triamcinolone ointment  Commonly known as:  MYCOLOG  Apply topically 2 (two) times daily. To areas of redness     omega-3 acid ethyl esters 1 gram capsule  Commonly known as:  LOVAZA  Take 2 g by mouth 2 (two) times daily.     pentoxifylline 400 mg Tbsr  Commonly known as:  TRENTAL  Take 400 mg by mouth 2 (two) times daily with meals.        STOP taking these medications    atorvastatin 40 MG tablet  Commonly known as:  LIPITOR            Indwelling Lines/Drains at time of discharge:   Lines/Drains/Airways          None          Time spent on the discharge of patient: 45 minutes  Patient was seen and examined on the date of discharge and determined to  be suitable for discharge.         JARET Bueno MD  [facility]  Narinder Junior MD, [facility] [place]   MD Nurys Shabazz PA-C Renee Melancon, PA-C Kristin Stein, PA-C Arekeva Selmon, NP-C  [address] [place] [id number]  Office Phone: [phone]  Office Fax: [phone]

## 2018-09-13 NOTE — PLAN OF CARE
Pt being discharged home and prefers to resume services  with [facility] as prior to admit.[facility] sent to [facility] via [facility] and pt accepted back. Tn also sent odrer for hospital bed to [facility] and verified address on facesheet is correct and provided pt's sister's # for delivery contact (Key [phone]) as requested by pt. Tn informed pt of [facility] as pt has hx of CHF and lace 66. Pt agrees to follow up in [facility]. TN received email from Teresa Hinojosa in [facility] for appt 9/21/18 @ 3:00. Tn placed info on AVS. Pt had new medications delivered to bedside and floor nurse and Tn educated pt on meds and when to take. Pt verbalized understanding. Pt's nephew arrived to take pt home and has portable oxygen tank. Tn also informed pt and nephew of [facility] has been sent order for hospital bed and area will need to be cleared at home prior to delivery.. Pt has D?c folder, brochure, card and encouraged to call for additional needs.       09/13/18 1417   Final Note   Assessment Type Final Discharge Note   Discharge Disposition Home-Health   What phone number can be called within the next 1-3 days to see how you are doing after discharge? [phone]  (sister, Key)   Hospital Follow Up  Appt(s) scheduled? Yes   Discharge plans and expectations educations in teach back method with documentation complete? Yes   [facility] Referral Info   Post Acute Recommendation Home-care   Referral Type home health   [facility] Name [facility]

## 2018-09-13 NOTE — PLAN OF CARE
Problem: Physical Therapy Goal  Goal: Physical Therapy Goal  Goals to be met by: 10/12/2018     Patient will increase functional independence with mobility by performin. Supine to sit with Modified Harper  2. Sit to stand transfer with Modified Harper  3. Bed to chair transfer with Modified Harper using Rolling Walker  4. Gait  x 20 feet with Modified Harper using Rolling Walker.      Outcome: Ongoing (interventions implemented as appropriate)      Pt continues to work toward established goals.

## 2018-09-13 NOTE — PLAN OF CARE
Problem: Patient Care Overview  Goal: Plan of Care Review  Outcome: Ongoing (interventions implemented as appropriate)  Pt AAOx4, sister at bedside throughout shift. Pt complains of cough but denies pain, n/v/d, and SOB. Pt minimally ambulatory with a walker to [facility], urinating without difficulties. Diabetic diet tolerated well. Oxygen therapy via nasal cannula utilized. Blood glucose checks continued. Safety maintained, bed alarm on - will cont to monitor.

## 2018-09-13 NOTE — PHYSICIAN QUERY
PT Name: Adela Jessica  MR #: [id number]    Physician Query Form - Cause and Effect Relationship Clarification      CDS/: Shayy Luna RN CDI            Contact information: [email or web address]    This form is a permanent document in the medical record.     Query Date: September 13, 2018    By submitting this query, we are merely seeking further clarification of documentation. Please utilize your independent clinical judgment when addressing the question(s) below.    The Medical record contains the following:  Supporting Clinical Findings   Location in record      Acute on chronic respiratory failure with hypoxia    Chronic hypercapnia  Obstructive sleep apnea  Supplemental oxygen dependent  Pt presents with 2-3 days of worsening SOB, leg swelling, and productive cough. Pt on 3 L O2 via NC at home. States she does not use her CPAP machine as recommended.     - ?centrilobular emphysema noted in chart. Pt denies history of smoking.  - WBC count normal. Afebrile. CO2 chronically elevated; appears to be at baseline at 39.  - Chest XR showed cardiomegaly; pulmonary vascular congestion with mild prominence of the interstitial markings possibly from CHF; findings suggestive of calcified right hilar nodes.  - DuoNebs, monitor O2 sats, continue supplemental oxygen and wean as tolerated.  - BNP normal but pt seems wet on exam. Give lasix 40 mg IV. Monitor I&Os. Will order echo.        after dose of IV lasix. Pt put out 4900 cc urine overnight  Echo showed EF 55-60%, no wall motion abnormalities. Indicative of heart failure with preserved ejection fraction. Lasix 20 mg IV was given.  Feeling better today. Less SOB but still not back to baseline. Continue IV furosemide for diuresis. Continue supplemental oxygen.                                                                                                                                                                                     "H&P                                            Hospital medicine note  9/12 347 pm      Furosemide 40 mg IV once 9/11 1851                       20 mg IV once 9/12 1535                                                                                                                                                                                      Medication sheets         Provider, please clarify if there is any correlation between "Acute on chronic respiratory failure" and "HFpEF".         Are the conditions:     [ x ] Due to or associated with each other     [  ] Unrelated to each other     [  ] Other (Please Specify): _________________________     [  ] Clinically Undetermined                                                                                                                                                                                              "

## 2018-09-14 ENCOUNTER — PATIENT OUTREACH (OUTPATIENT)
Dept: ADMINISTRATIVE | Facility: CLINIC | Age: 78
End: 2018-09-14

## 2018-09-14 NOTE — PATIENT INSTRUCTIONS
When Your Child Has Congestive Heart Failure (CHF)  Congestive heart failure (CHF) is a condition in which the heart does not pump as well as it should. When this happens, fluid can build up in the lungs or body tissues (congestion). CHF can cause lung problems, organ failure, and other serious problems in the body. CHF can usually be treated, but it is important to find out the underlying cause. Your childs healthcare provider will evaluate your childs heart and discuss treatment options with you.  What causes congestive heart failure?  CHF often develops in children with certain heart defects present at birth (congenital heart defects). These include defects such as holes in the heart, which cause an increased amount of blood flow from one side of the heart to the other. This changes the dynamics of blood flow and can cause one side of the heart to become weaker. The heart then is unable to support the blood flow resulting in worsening heart function. CHF can also be caused by other types of heart problems such as cardiomyopathy, a condition in which the hearts pumping function is impaired. Some non-heart problems, such as kidney failure, can lead to CHF due to changes in the body's fluid balance or hormone changes that lead to high blood pressure.    What are the symptoms of CHF?  Symptoms vary but may include:  · Swelling (edema) in the face, abdomen, ankles, or feet  · Shortness of breath, rapid breathing, wheezing, or excessive coughing  · Sweating  · Weakness or tiredness  · Poor feeding and weight gain (in infants)  · Racing heartbeat  · Wheezing  · Abdominal pain and nausea  In older children, symptoms may also include:  · Weight loss  · Passing out  · Chest pain  · Tiring easily during exercise  How is the cause of congestive heart failure diagnosed?  Heart problems in children are usually diagnosed and treated by a pediatric cardiologist. This is a doctor who specializes in diagnosing and treating  children's heart disease. The cardiologist will do a physical exam and ask about your childs health history. The following tests may be done to find the underlying cause of CHF:  · Chest X-ray. This test takes a picture of the heart and lungs. The picture can show your childs heart size and shape. This picture also shows the fluid status of your child's lungs, which can be a clue to the heart's function.  · Electrocardiogram (ECG or EKG). During this test, the electrical activity of the heart is recorded to check for heart rhythm problems (arrhythmias) or problems with heart structure.  · Echocardiogram (echo). During this test, sound waves (ultrasound) are used to create a picture of the heart. This test can show problems with heart structure or function. This includes showing how well the heart pumps, if the heart is enlarged, the direction and strength of blood flow, or if there are any valve problems.  · Lab tests. For these tests, blood and urine samples are taken to check for problems in the kidneys or other organs.  How is congestive heart failure treated?  Specific treatment for your child depends on the cause of CHF. If the cause of CHF in your child is a congenital heart defect, a catheter or surgical procedure may be done to repair the defect.  · Medicines are often prescribed to help manage your childs symptoms. These can include:  ¨ Diuretics help rid the body of excess water. This reduces fluid in the lungs and may improve breathing. These are very important in helping manage fluid status in heart failure.  ¨ Digoxin helps the heart pump blood with more force. This improves the hearts performance.  ¨ ACE inhibitors make blood vessels relax and allow blood to flow more easily from the heart.   ¨ Angiotensin receptor blockers or ARBs are very similar to ACE inhibitors. They may be used in a child who can't take an ACE inhibitor.  ¨ Beta-blockers lower blood pressure and slow heart rate by altering  hormones that can damage the heart. Beta-blockers can also improve the hearts pumping action over time.  · Pacemaker. Some children with heart failure need an artificial pacemaker. The pacemaker may help when the heart is not pumping well because of a slow heartbeat.  · Cardiac resynchronization therapy. This uses a special type of pacemaker that paces both pumping chambers of the heart at the same time to coordinate contractions and improve the heart's function. This treatment may be used in some children with long-term heart failure.  · Heart transplant. This is when the diseased heart is replaced with a healthy heart from a donor. This is only an option for very serious disease. And, it often takes some time to find a suitable heart. In some cases, a child may be able to be helped with devices that help the heart pump while he or she waits for a transplant.  Your child may benefit from seeing a nutritionist to help with nutrition for growth problems and to help balance fluids. He or she may also participate in an exercise rehab program to help with the ability to be active.  What are the long-term concerns?  The outcome for a child with CHF depends on many factors, including the underlying heart problem. The cardiologist will discuss your childs condition, treatment options, and potential outcomes with you.  Date Last Reviewed: 3/6/2016  © 1917-9296 [employer]. [address] [place] [address], [place] [address]. All rights reserved. This information is not intended as a substitute for professional medical care. Always follow your healthcare professional's instructions.

## 2018-09-17 ENCOUNTER — TELEPHONE (OUTPATIENT)
Dept: PRIMARY CARE CLINIC | Facility: CLINIC | Age: 78
End: 2018-09-17

## 2018-09-17 NOTE — TELEPHONE ENCOUNTER
[facility] RN clinician contacted patient to check on health status and confirm upcoming [facility] appointment.  Spoke to patient's sister Zaar, as she states patient does not talk on the phone.  States her sister is doing well.  [facility] home health nurse has been coming and did not have any concerns.  Requested to reschedule [facility] appointment from Friday 9/21/2018 @ 1500 to Wednesday 9/26/2018 @ 1500.  No issues or concerns to address at this time.

## 2018-09-28 ENCOUNTER — OFFICE VISIT (OUTPATIENT)
Dept: PRIMARY CARE CLINIC | Facility: CLINIC | Age: 78
End: 2018-09-28
Payer: COMMERCIAL

## 2018-09-28 VITALS
SYSTOLIC BLOOD PRESSURE: 137 MMHG | OXYGEN SATURATION: 96 % | DIASTOLIC BLOOD PRESSURE: 82 MMHG | TEMPERATURE: 97 F | HEART RATE: 77 BPM

## 2018-09-28 DIAGNOSIS — J96.21 ACUTE ON CHRONIC RESPIRATORY FAILURE WITH HYPOXIA: Primary | Chronic | ICD-10-CM

## 2018-09-28 DIAGNOSIS — I50.30 HEART FAILURE WITH PRESERVED EJECTION FRACTION: ICD-10-CM

## 2018-09-28 DIAGNOSIS — G47.33 OSA (OBSTRUCTIVE SLEEP APNEA): Chronic | ICD-10-CM

## 2018-09-28 DIAGNOSIS — E66.01 MORBID OBESITY DUE TO EXCESS CALORIES: ICD-10-CM

## 2018-09-28 DIAGNOSIS — Z99.81 SUPPLEMENTAL OXYGEN DEPENDENT: Chronic | ICD-10-CM

## 2018-09-28 PROBLEM — J18.9 PNEUMONIA OF BOTH LOWER LOBES DUE TO INFECTIOUS ORGANISM: Status: RESOLVED | Noted: 2017-09-14 | Resolved: 2018-09-28

## 2018-09-28 PROBLEM — R06.00 DYSPNEA: Status: RESOLVED | Noted: 2018-09-11 | Resolved: 2018-09-28

## 2018-09-28 PROCEDURE — 99999 PR PBB SHADOW E&M-EST. PATIENT-LVL IV: CPT | Mod: PBBFAC,,, | Performed by: INTERNAL MEDICINE

## 2018-09-28 PROCEDURE — 99215 OFFICE O/P EST HI 40 MIN: CPT | Mod: S$PBB,,, | Performed by: INTERNAL MEDICINE

## 2018-09-28 PROCEDURE — 99214 OFFICE O/P EST MOD 30 MIN: CPT | Mod: PBBFAC,PO | Performed by: INTERNAL MEDICINE

## 2018-09-28 RX ORDER — ATORVASTATIN CALCIUM 40 MG/1
40 TABLET, FILM COATED ORAL DAILY
COMMUNITY

## 2018-09-28 NOTE — PROGRESS NOTES
"[facility]  New Visit Progress Note   Recent Hospital Discharge     PRESENTING HISTORY     Chief Complaint/Reason for Admission:  Follow up Hospital Discharge     PCP: Cindy Spencer NP    History of Present Illness:  Ms. Adela Jessica is a 78 y.o. female who was recently admitted to the hospital.    [facility]  Discharge Summary     Patient Name: Adela Jessica  MRN: [id number]  Admission Date: 9/11/2018  Hospital Length of Stay: 2 days  Discharge Date and Time: 9/13/2018  2:22 PM  Attending Physician: Narinder Junior MD  Discharging Provider: Nahed Hernandez PA-C  Primary Care Provider: Cindy Spencer NP  ___________________________________________________________________    Today:  [facility] for [facility] follow up.  Recently hospitalized for dyspnea.  Findings significant for pulmonary vascular congestion and cardiomegaly on chest X ray.  However, BNP not elevated, and 2 D echo with preserved EF (55-60%) and no evidence of diastolic dysfunction.  She responded well to diuresis and supportive care.  Was discharged home with assistance of [facility].    Accompanied today by her nephew.  She is in a wheelchair and has supplemental oxygen in place.   Patient is mostly wheelchair and bed bound, but able to ambulate short distances in her home with some assistance and a rolling walker.  Requires assistance with transfers to tub and toilet. Requires assistance with hygiene and ADL's.  Primary caretaker is her sister, who is disabled herself.  The patients social situation is complicated- She lives in a trailer in [place], her family describes it as "filthy" with garbage, leftover food, and animal feces.   There are multiple other family members who live in the home under unusual circumstances who do not participate in the patients care and do not contribute to the upkeep of the home.     Patient and nephew are unable to verify any medications.  She " "brought her pill bottles today, but most are empty.  It is unclear if the pills are in the medication divider in her home, or if she is truly out of the medication.  She has a home CPAP machine but does not wear it- tells me "I can't stand that air in my face".   Call to [facility] confirms that she has RN visits and that medication reconciliation has been done in the home.  The team tells me the patient has a pill divider at home that has been organized and verified by the visiting RN.       Review of Systems  General ROS: negative for chills, fever or weight loss  Psychological ROS: negative for hallucination, depression or suicidal ideation  Ophthalmic ROS: negative for blurry vision, photophobia or eye pain  ENT ROS: negative for epistaxis, sore throat or rhinorrhea  Respiratory ROS: no cough, shortness of breath, or wheezing  Cardiovascular ROS: no chest pain + dyspnea on exertion  Gastrointestinal ROS: no abdominal pain, change in bowel habits, or black/ bloody stools  Genito-Urinary ROS: no dysuria, trouble voiding, or hematuria  Musculoskeletal ROS: negative for gait disturbance or muscular weakness  Neurological ROS: no syncope or seizures; no ataxia  Dermatological ROS: negative for pruritis, rash and jaundice    PAST HISTORY:     Past Medical History:   Diagnosis Date    Arthritis     CKD (chronic kidney disease)     COPD (chronic obstructive pulmonary disease)     Dementia     Depression     DM2 (diabetes mellitus, type 2)     GERD (gastroesophageal reflux disease)     HLD (hyperlipidemia)     HTN (hypertension)     HIWOT on CPAP     Streptococcal pneumonia 1/27/2017       Past Surgical History:   Procedure Laterality Date    HYSTERECTOMY         No family history on file.    Social History     Socioeconomic History    Marital status: Single     Spouse name: Not on file    Number of children: Not on file    Years of education: Not on file    Highest education level: Not on file "   Social Needs    Financial resource strain: Not on file    Food insecurity - worry: Not on file    Food insecurity - inability: Not on file    Transportation needs - medical: Not on file    Transportation needs - non-medical: Not on file   Occupational History    Occupation: remi     Comment: retired   Tobacco Use    Smoking status: Never Smoker    Smokeless tobacco: Never Used   Substance and Sexual Activity    Alcohol use: No    Drug use: No    Sexual activity: Not on file   Other Topics Concern    Not on file   Social History Narrative    Not on file       MEDICATIONS & ALLERGIES:     Current Outpatient Medications on File Prior to Visit   Medication Sig Dispense Refill    albuterol (PROVENTIL/VENTOLIN) 90 mcg/actuation inhaler Inhale 2 puffs into the lungs every 6 (six) hours as needed for Wheezing. 2 each 0    amitriptyline (ELAVIL) 100 MG tablet Take 100 mg by mouth nightly as needed for Insomnia.      amlodipine (NORVASC) 5 MG tablet Take 1 tablet (5 mg total) by mouth once daily. 30 tablet 11    donepezil (ARICEPT) 5 MG tablet Take 5 mg by mouth every evening.      duloxetine (CYMBALTA) 60 MG capsule Take 1 capsule (60 mg total) by mouth once daily. 30 capsule 2    fish oil-omega-3 fatty acids 300-1,000 mg capsule Take 1 g by mouth 2 (two) times daily.       fluticasone-salmeterol 100-50 mcg/dose (ADVAIR) 100-50 mcg/dose diskus inhaler Inhale 1 puff into the lungs 2 (two) times daily.      furosemide (LASIX) 40 MG tablet Take 1 tablet (40 mg total) by mouth 3 (three) times a week on Monday, Wednesday, and Friday. 12 tablet 3    glipiZIDE (GLUCOTROL) 5 MG tablet Take 0.5 tablets (2.5 mg total) by mouth daily with breakfast. (Patient taking differently: Take 5 mg by mouth daily with breakfast. ) 15 tablet 1    miconazole NITRATE 2 % (MICOTIN) 2 % top powder Apply topically 2 (two) times daily. To affected area of breast.  0    multivitamin (THERAGRAN) per tablet Take 1 tablet by  mouth once daily.      nystatin-triamcinolone (MYCOLOG) ointment Apply topically 2 (two) times daily. To areas of redness 60 g 0    omega-3 acid ethyl esters (LOVAZA) 1 gram capsule Take 2 g by mouth 2 (two) times daily.      pentoxifylline (TRENTAL) 400 mg TbSR Take 400 mg by mouth 2 (two) times daily with meals.      potassium chloride SA (K-DUR,KLOR-CON) 20 MEQ tablet Take 2 tablets (40 mEq total) by mouth 3 (three) times a week on Monday, Wednesday, and Friday. 30 tablet 3    pregabalin (LYRICA) 50 MG capsule Take 1 capsule (50 mg total) by mouth 2 (two) times daily. (Patient taking differently: Take 50 mg by mouth 3 (three) times daily. )       No current facility-administered medications on file prior to visit.         Review of patient's allergies indicates:  No Known Allergies    OBJECTIVE:     Vital Signs:  Vitals:    09/28/18 1018   BP: 137/82   Pulse: 77   Temp: 97.4 °F (36.3 °C)     Wt Readings from Last 1 Encounters:   09/11/18 1945 (!) 152.3 kg (335 lb 12.2 oz)   09/11/18 1427 (!) 158.8 kg (350 lb)     There is no height or weight on file to calculate BMI.        Physical Exam:  /82 (BP Location: Left arm, Patient Position: Sitting, BP Method: Small (Automatic))   Pulse 77   Temp 97.4 °F (36.3 °C) (Oral)   SpO2 96%   General appearance: alert, cooperative, no distress  Constitutional:Oriented to person, place, and time  + appears well-developed and well-nourished.  + morbidly obese    HEENT: Normocephalic, atraumatic, neck symmetrical, no nasal discharge   Eyes: conjunctivae/corneas clear, PERRL, EOM's intact  Lungs: clear to auscultation bilaterally, no dullness to percussion bilaterally  Heart: regular rate and rhythm without rub; no displacement of the PMI   Abdomen: soft, non-tender; bowel sounds normoactive; no organomegaly  Extremities: extremities symmetric; no clubbing, cyanosis,   + trace LE edema, bilateral   Integument: Skin color, texture, turgor normal; no rashes; hair  distrubution normal  + peeling skin, erythematous skin to both shins   Neurologic: Alert and oriented X 3, normal strength,  Psychiatric: no pressured speech; normal affect; no evidence of impaired cognition     Laboratory  Lab Results   Component Value Date    WBC 6.24 09/13/2018    HGB 9.7 (L) 09/13/2018    HCT 33.3 (L) 09/13/2018     (H) 09/13/2018     09/13/2018     BMP  Lab Results   Component Value Date     09/13/2018    K 3.6 09/13/2018    CL 92 (L) 09/13/2018    CO2 46 (HH) 09/13/2018    BUN 9 09/13/2018    CREATININE 0.9 09/13/2018    CALCIUM 9.3 09/13/2018    ANIONGAP 6 (L) 09/13/2018    ESTGFRAFRICA >60 09/13/2018    EGFRNONAA >60 09/13/2018     Lab Results   Component Value Date    ALT 10 09/11/2018    AST 16 09/11/2018    ALKPHOS 82 09/11/2018    BILITOT 0.3 09/11/2018     Lab Results   Component Value Date    INR 0.9 08/20/2017    INR 1.1 03/06/2016     Lab Results   Component Value Date    HGBA1C 4.9 09/11/2018     No results for input(s): POCTGLUCOSE in the last 72 hours.    Diagnostic Results:  2 D echo 9/12/18:  CONCLUSIONS     1 - Normal left ventricular systolic function (EF 55-60%).     2 - Normal left ventricular diastolic function.     3 - Normal right ventricular systolic function .     4 - The estimated PA systolic pressure is 24 mmHg.     5 - Intermediate central venous pressure.     ASSESSMENT & PLAN:       Acute on chronic respiratory failure with hypoxia  Supplemental oxygen dependent  Heart failure with preserved ejection fraction  - recent hospitalization for respiratory failure related to pulmonary edema- responded well to diuresis and supportive care  - remains on her baseline supplemental oxygen @ 3 L / min  - unclear compliance with medications    Morbid obesity due to excess calories  - essentially sedentary due to obesity    HIWOT (obstructive sleep apnea)  - does not wear her home CPAP    [facility] will [facility].  She has been  asked to bring all home medication bottles and her home pill divider.   Instructions for the patient:      Scheduled Follow-up :  Future Appointments   Date Time Provider Department Center   10/10/2018  2:00 PM Carla Mcknight MD [facility]       Post Visit Medication List:     Medication List           Accurate as of 9/28/18  1:15 PM. If you have any questions, ask your nurse or doctor.               CHANGE how you take these medications    glipiZIDE 5 MG tablet  Commonly known as:  GLUCOTROL  Take 0.5 tablets (2.5 mg total) by mouth daily with breakfast.  What changed:  how much to take     pregabalin 50 MG capsule  Commonly known as:  LYRICA  Take 1 capsule (50 mg total) by mouth 2 (two) times daily.  What changed:  when to take this        CONTINUE taking these medications    albuterol 90 mcg/actuation inhaler  Commonly known as:  PROVENTIL/VENTOLIN  Inhale 2 puffs into the lungs every 6 (six) hours as needed for Wheezing.     amitriptyline 100 MG tablet  Commonly known as:  ELAVIL     amLODIPine 5 MG tablet  Commonly known as:  NORVASC  Take 1 tablet (5 mg total) by mouth once daily.     atorvastatin 40 MG tablet  Commonly known as:  LIPITOR     donepezil 5 MG tablet  Commonly known as:  ARICEPT     DULoxetine 60 MG capsule  Commonly known as:  CYMBALTA  Take 1 capsule (60 mg total) by mouth once daily.     fish oil-omega-3 fatty acids 300-1,000 mg capsule     fluticasone-salmeterol 100-50 mcg/dose 100-50 mcg/dose diskus inhaler  Commonly known as:  ADVAIR     furosemide 40 MG tablet  Commonly known as:  LASIX  Take 1 tablet (40 mg total) by mouth 3 (three) times a week on Monday, Wednesday, and Friday.     miconazole NITRATE 2 % 2 % top powder  Commonly known as:  MICOTIN  Apply topically 2 (two) times daily. To affected area of breast.     multivitamin per tablet  Commonly known as:  THERAGRAN     nystatin-triamcinolone ointment  Commonly known as:  MYCOLOG  Apply topically 2 (two) times  daily. To areas of redness     omega-3 acid ethyl esters 1 gram capsule  Commonly known as:  LOVAZA     pentoxifylline 400 mg Tbsr  Commonly known as:  TRENTAL     potassium chloride SA 20 MEQ tablet  Commonly known as:  K-DUR,KLOR-CON  Take 2 tablets (40 mEq total) by mouth 3 (three) times a week on Monday, Wednesday, and Friday.            Signing Physician:  Carla Mcknight MD

## 2019-06-06 ENCOUNTER — HOSPITAL ENCOUNTER (EMERGENCY)
Facility: HOSPITAL | Age: 79
Discharge: HOME | End: 2019-06-07
Attending: EMERGENCY MEDICINE
Payer: MEDICARE

## 2019-06-06 DIAGNOSIS — L03.119 CELLULITIS OF LOWER EXTREMITY, UNSPECIFIED LATERALITY: Primary | ICD-10-CM

## 2019-06-06 DIAGNOSIS — B35.6 TINEA CRURIS: ICD-10-CM

## 2019-06-06 DIAGNOSIS — R60.9 EDEMA: ICD-10-CM

## 2019-06-06 DIAGNOSIS — R60.9 SWELLING: ICD-10-CM

## 2019-06-06 DIAGNOSIS — R06.00 DYSPNEA: ICD-10-CM

## 2019-06-06 LAB
ALBUMIN SERPL BCP-MCNC: 2.8 G/DL (ref 3.5–5.2)
ALP SERPL-CCNC: 86 U/L (ref 55–135)
ALT SERPL W/O P-5'-P-CCNC: 10 U/L (ref 10–44)
ANION GAP SERPL CALC-SCNC: 9 MMOL/L (ref 8–16)
AST SERPL-CCNC: 20 U/L (ref 10–40)
BACTERIA #/AREA URNS HPF: NORMAL /HPF
BILIRUB SERPL-MCNC: 0.6 MG/DL (ref 0.1–1)
BILIRUB UR QL STRIP: NEGATIVE
BNP SERPL-MCNC: 43 PG/ML (ref 0–99)
BUN SERPL-MCNC: 11 MG/DL (ref 8–23)
CALCIUM SERPL-MCNC: 9.5 MG/DL (ref 8.7–10.5)
CHLORIDE SERPL-SCNC: 92 MMOL/L (ref 95–110)
CLARITY UR: CLEAR
CO2 SERPL-SCNC: 41 MMOL/L (ref 23–29)
COLOR UR: YELLOW
CREAT SERPL-MCNC: 1.1 MG/DL (ref 0.5–1.4)
EST. GFR  (AFRICAN AMERICAN): 55 ML/MIN/1.73 M^2
EST. GFR  (NON AFRICAN AMERICAN): 48 ML/MIN/1.73 M^2
GLUCOSE SERPL-MCNC: 112 MG/DL (ref 70–110)
GLUCOSE UR QL STRIP: NEGATIVE
HGB UR QL STRIP: NEGATIVE
HYALINE CASTS #/AREA URNS LPF: 0 /LPF
KETONES UR QL STRIP: NEGATIVE
LEUKOCYTE ESTERASE UR QL STRIP: NEGATIVE
MICROSCOPIC COMMENT: NORMAL
NITRITE UR QL STRIP: NEGATIVE
PH UR STRIP: >8 [PH] (ref 5–8)
POTASSIUM SERPL-SCNC: 4.4 MMOL/L (ref 3.5–5.1)
PROT SERPL-MCNC: 7.5 G/DL (ref 6–8.4)
PROT UR QL STRIP: ABNORMAL
RBC #/AREA URNS HPF: 0 /HPF (ref 0–4)
SODIUM SERPL-SCNC: 142 MMOL/L (ref 136–145)
SP GR UR STRIP: 1.01 (ref 1–1.03)
TROPONIN I SERPL DL<=0.01 NG/ML-MCNC: 0.01 NG/ML (ref 0–0.03)
URN SPEC COLLECT METH UR: ABNORMAL
UROBILINOGEN UR STRIP-ACNC: 1 EU/DL
WBC #/AREA URNS HPF: 0 /HPF (ref 0–5)

## 2019-06-06 PROCEDURE — 83880 ASSAY OF NATRIURETIC PEPTIDE: CPT

## 2019-06-06 PROCEDURE — 81000 URINALYSIS NONAUTO W/SCOPE: CPT

## 2019-06-06 PROCEDURE — 80053 COMPREHEN METABOLIC PANEL: CPT

## 2019-06-06 PROCEDURE — 85025 COMPLETE CBC W/AUTO DIFF WBC: CPT

## 2019-06-06 PROCEDURE — 93010 ELECTROCARDIOGRAM REPORT: CPT | Mod: ,,, | Performed by: STUDENT IN AN ORGANIZED HEALTH CARE EDUCATION/TRAINING PROGRAM

## 2019-06-06 PROCEDURE — 93005 ELECTROCARDIOGRAM TRACING: CPT

## 2019-06-06 PROCEDURE — 93010 EKG 12-LEAD: ICD-10-PCS | Mod: ,,, | Performed by: STUDENT IN AN ORGANIZED HEALTH CARE EDUCATION/TRAINING PROGRAM

## 2019-06-06 PROCEDURE — 99285 EMERGENCY DEPT VISIT HI MDM: CPT | Mod: 25

## 2019-06-06 PROCEDURE — 84484 ASSAY OF TROPONIN QUANT: CPT

## 2019-06-06 RX ORDER — TIZANIDINE 4 MG/1
4 TABLET ORAL EVERY 6 HOURS PRN
COMMUNITY

## 2019-06-07 VITALS
WEIGHT: 293 LBS | HEIGHT: 66 IN | BODY MASS INDEX: 47.09 KG/M2 | RESPIRATION RATE: 18 BRPM | TEMPERATURE: 98 F | SYSTOLIC BLOOD PRESSURE: 149 MMHG | OXYGEN SATURATION: 97 % | HEART RATE: 79 BPM | DIASTOLIC BLOOD PRESSURE: 78 MMHG

## 2019-06-07 LAB
ANISOCYTOSIS BLD QL SMEAR: SLIGHT
BASOPHILS # BLD AUTO: 0.01 K/UL (ref 0–0.2)
BASOPHILS NFR BLD: 0.1 % (ref 0–1.9)
DACRYOCYTES BLD QL SMEAR: ABNORMAL
DIFFERENTIAL METHOD: ABNORMAL
EOSINOPHIL # BLD AUTO: 0.1 K/UL (ref 0–0.5)
EOSINOPHIL NFR BLD: 1.1 % (ref 0–8)
ERYTHROCYTE [DISTWIDTH] IN BLOOD BY AUTOMATED COUNT: 14.8 % (ref 11.5–14.5)
HCT VFR BLD AUTO: 34.1 % (ref 37–48.5)
HGB BLD-MCNC: 9.6 G/DL (ref 12–16)
HYPOCHROMIA BLD QL SMEAR: ABNORMAL
LYMPHOCYTES # BLD AUTO: 1 K/UL (ref 1–4.8)
LYMPHOCYTES NFR BLD: 11.7 % (ref 18–48)
MCH RBC QN AUTO: 28.6 PG (ref 27–31)
MCHC RBC AUTO-ENTMCNC: 28.2 G/DL (ref 32–36)
MCV RBC AUTO: 102 FL (ref 82–98)
MONOCYTES # BLD AUTO: 0.5 K/UL (ref 0.3–1)
MONOCYTES NFR BLD: 5.7 % (ref 4–15)
NEUTROPHILS # BLD AUTO: 6.7 K/UL (ref 1.8–7.7)
NEUTROPHILS NFR BLD: 81.4 % (ref 38–73)
PLATELET # BLD AUTO: 301 K/UL (ref 150–350)
PLATELET BLD QL SMEAR: ABNORMAL
PMV BLD AUTO: 8.4 FL (ref 9.2–12.9)
POIKILOCYTOSIS BLD QL SMEAR: SLIGHT
RBC # BLD AUTO: 3.36 M/UL (ref 4–5.4)
WBC # BLD AUTO: 8.3 K/UL (ref 3.9–12.7)

## 2019-06-07 PROCEDURE — 25000003 PHARM REV CODE 250: Performed by: EMERGENCY MEDICINE

## 2019-06-07 PROCEDURE — 96365 THER/PROPH/DIAG IV INF INIT: CPT

## 2019-06-07 PROCEDURE — S0077 INJECTION, CLINDAMYCIN PHOSP: HCPCS | Performed by: EMERGENCY MEDICINE

## 2019-06-07 RX ORDER — CLINDAMYCIN PHOSPHATE 150 MG/ML
900 INJECTION, SOLUTION INTRAVENOUS
Status: DISCONTINUED | OUTPATIENT
Start: 2019-06-07 | End: 2019-06-07

## 2019-06-07 RX ORDER — FUROSEMIDE 20 MG/1
20 TABLET ORAL EVERY 12 HOURS
Qty: 10 TABLET | Refills: 0 | Status: SHIPPED | OUTPATIENT
Start: 2019-06-07 | End: 2020-06-06

## 2019-06-07 RX ORDER — CLINDAMYCIN PHOSPHATE 900 MG/50ML
900 INJECTION, SOLUTION INTRAVENOUS
Status: COMPLETED | OUTPATIENT
Start: 2019-06-07 | End: 2019-06-07

## 2019-06-07 RX ORDER — CLINDAMYCIN HYDROCHLORIDE 150 MG/1
300 CAPSULE ORAL 4 TIMES DAILY
Qty: 56 CAPSULE | Refills: 0 | Status: SHIPPED | OUTPATIENT
Start: 2019-06-07 | End: 2019-06-07 | Stop reason: SDUPTHER

## 2019-06-07 RX ORDER — CLINDAMYCIN HYDROCHLORIDE 150 MG/1
300 CAPSULE ORAL 4 TIMES DAILY
Qty: 56 CAPSULE | Refills: 0 | Status: SHIPPED | OUTPATIENT
Start: 2019-06-07 | End: 2019-06-14

## 2019-06-07 RX ORDER — FUROSEMIDE 40 MG/1
40 TABLET ORAL
Status: DISCONTINUED | OUTPATIENT
Start: 2019-06-07 | End: 2019-06-07 | Stop reason: HOSPADM

## 2019-06-07 RX ORDER — CLOTRIMAZOLE 1 %
CREAM (GRAM) TOPICAL
Qty: 15 G | Refills: 0 | Status: SHIPPED | OUTPATIENT
Start: 2019-06-07

## 2019-06-07 RX ADMIN — CLINDAMYCIN IN 5 PERCENT DEXTROSE 900 MG: 18 INJECTION, SOLUTION INTRAVENOUS at 01:06

## 2019-06-07 NOTE — ED NOTES
IV catheters removed from RAC and left hand. Catheters intact and bandage applied to sites. Pt. Tolerated well.

## 2019-06-07 NOTE — ED NOTES
Dr. Nobles and nurse at the bedside to assist pt. With standing on the side of the bed. Pt. Tolerated ambulating well. Pt. And niece report that pt. Gets short of breath with minimal exertion, but that has been occurring for awhile now.

## 2019-06-07 NOTE — ED NOTES
Dr. Nobles on the bedside, pt was turned to assess posterior side. Pt has a stage 1 pressure injury to the R ischial aspect  Of the buttock. Pt also has broken skin to the R side of the abd between the skin folds of the stomach, and possible fungal infection to the anterior aspect of the abd in between skin folds.

## 2019-06-07 NOTE — ED TRIAGE NOTES
Pt seen in the ED with complaints of L sided knee pain since yesterday. Pt usually ambulates with a walker at home but the pain got worse and now the pt is unable to bear weight on affected extremity. Pt is morbidly obese and musculoskeletal assessment is limited. Pt reports that her legs are swollen, it is difficult to assess for edema since pts weight condition. Pt wears compression stockings and reports having ADRIANA leg cellulitis. Strength assessment in all extremities is WDL. Pt was placed on 2 LPNC sating at 93%, pt stable, will continue to monitor.

## 2019-06-07 NOTE — ED NOTES
Discharge instructions reviewed with pt. And family. Pt. Unable to pull herself up in the family members van while being assisted with ER staff and security.

## 2019-06-07 NOTE — DISCHARGE INSTRUCTIONS
Return promptly if concerning symptoms arise, continue to lose weight, elevate legs, keep areas under your abdomen and apply antifungal cream over irritated portion

## 2020-11-06 ENCOUNTER — LAB VISIT (OUTPATIENT)
Dept: LAB | Facility: HOSPITAL | Age: 80
End: 2020-11-06
Attending: INTERNAL MEDICINE
Payer: MEDICARE

## 2020-11-06 DIAGNOSIS — R33.9 RETENTION OF URINE, UNSPECIFIED: Primary | ICD-10-CM

## 2020-11-06 LAB — POTASSIUM SERPL-SCNC: 4.5 MMOL/L (ref 3.5–5.1)

## 2020-11-06 PROCEDURE — 84132 ASSAY OF SERUM POTASSIUM: CPT | Mod: PO
